# Patient Record
Sex: FEMALE | Race: WHITE | NOT HISPANIC OR LATINO | Employment: OTHER | URBAN - METROPOLITAN AREA
[De-identification: names, ages, dates, MRNs, and addresses within clinical notes are randomized per-mention and may not be internally consistent; named-entity substitution may affect disease eponyms.]

---

## 2017-01-25 ENCOUNTER — OFFICE VISIT - HEALTHEAST (OUTPATIENT)
Dept: FAMILY MEDICINE | Facility: CLINIC | Age: 67
End: 2017-01-25

## 2017-01-25 DIAGNOSIS — K59.00 CONSTIPATION: ICD-10-CM

## 2017-01-25 DIAGNOSIS — Z00.00 ROUTINE GENERAL MEDICAL EXAMINATION AT A HEALTH CARE FACILITY: ICD-10-CM

## 2017-01-25 DIAGNOSIS — Z01.411 ENCOUNTER FOR GYNECOLOGICAL EXAMINATION WITH ABNORMAL FINDING: ICD-10-CM

## 2017-01-25 DIAGNOSIS — M51.379 DEGENERATION OF LUMBAR OR LUMBOSACRAL INTERVERTEBRAL DISC: ICD-10-CM

## 2017-01-25 DIAGNOSIS — E78.5 HYPERLIPIDEMIA: ICD-10-CM

## 2017-01-25 DIAGNOSIS — Z00.00 HEALTH CARE MAINTENANCE: ICD-10-CM

## 2017-01-25 DIAGNOSIS — I10 BENIGN ESSENTIAL HYPERTENSION: ICD-10-CM

## 2017-01-25 DIAGNOSIS — E55.9 VITAMIN D DEFICIENCY: ICD-10-CM

## 2017-01-25 DIAGNOSIS — I10 ESSENTIAL HYPERTENSION, BENIGN: ICD-10-CM

## 2017-01-25 DIAGNOSIS — D22.9 ATYPICAL NEVI: ICD-10-CM

## 2017-01-25 ASSESSMENT — MIFFLIN-ST. JEOR: SCORE: 1507.24

## 2017-01-30 LAB
BKR LAB AP ABNORMAL BLEEDING: NO
BKR LAB AP BIRTH CONTROL/HORMONES: NORMAL
BKR LAB AP CERVICAL APPEARANCE: NORMAL
BKR LAB AP GYN ADEQUACY: NORMAL
BKR LAB AP GYN INTERPRETATION: NORMAL
BKR LAB AP HPV REFLEX: NORMAL
BKR LAB AP LMP: NORMAL
BKR LAB AP PATIENT STATUS: NO
BKR LAB AP PREVIOUS ABNORMAL: NORMAL
BKR LAB AP PREVIOUS NORMAL: NORMAL
HIGH RISK?: NO
HPV INTERPRETATION - HISTORICAL: NORMAL
HPV INTERPRETER - HISTORICAL: NORMAL
PATH REPORT.COMMENTS IMP SPEC: NORMAL
RESULT FLAG (HE HISTORICAL CONVERSION): NORMAL

## 2017-01-31 ENCOUNTER — AMBULATORY - HEALTHEAST (OUTPATIENT)
Dept: LAB | Facility: CLINIC | Age: 67
End: 2017-01-31

## 2017-01-31 DIAGNOSIS — E78.5 HYPERLIPIDEMIA: ICD-10-CM

## 2017-01-31 DIAGNOSIS — E55.9 VITAMIN D DEFICIENCY: ICD-10-CM

## 2017-01-31 DIAGNOSIS — I10 BENIGN ESSENTIAL HYPERTENSION: ICD-10-CM

## 2017-01-31 LAB
CHOLEST SERPL-MCNC: 140 MG/DL
FASTING STATUS PATIENT QL REPORTED: YES
HDLC SERPL-MCNC: 53 MG/DL
LDLC SERPL CALC-MCNC: 72 MG/DL
TRIGL SERPL-MCNC: 77 MG/DL

## 2017-02-07 ENCOUNTER — RECORDS - HEALTHEAST (OUTPATIENT)
Dept: ADMINISTRATIVE | Facility: OTHER | Age: 67
End: 2017-02-07

## 2017-08-26 ENCOUNTER — COMMUNICATION - HEALTHEAST (OUTPATIENT)
Dept: FAMILY MEDICINE | Facility: CLINIC | Age: 67
End: 2017-08-26

## 2017-08-26 DIAGNOSIS — I10 BENIGN ESSENTIAL HYPERTENSION: ICD-10-CM

## 2017-11-20 ENCOUNTER — AMBULATORY - HEALTHEAST (OUTPATIENT)
Dept: FAMILY MEDICINE | Facility: CLINIC | Age: 67
End: 2017-11-20

## 2017-11-20 DIAGNOSIS — M25.569 KNEE PAIN: ICD-10-CM

## 2017-11-21 ENCOUNTER — RECORDS - HEALTHEAST (OUTPATIENT)
Dept: ADMINISTRATIVE | Facility: OTHER | Age: 67
End: 2017-11-21

## 2017-12-01 ENCOUNTER — RECORDS - HEALTHEAST (OUTPATIENT)
Dept: ADMINISTRATIVE | Facility: OTHER | Age: 67
End: 2017-12-01

## 2017-12-06 ENCOUNTER — RECORDS - HEALTHEAST (OUTPATIENT)
Dept: ADMINISTRATIVE | Facility: OTHER | Age: 67
End: 2017-12-06

## 2017-12-08 ENCOUNTER — RECORDS - HEALTHEAST (OUTPATIENT)
Dept: ADMINISTRATIVE | Facility: OTHER | Age: 67
End: 2017-12-08

## 2017-12-11 ENCOUNTER — RECORDS - HEALTHEAST (OUTPATIENT)
Dept: ADMINISTRATIVE | Facility: OTHER | Age: 67
End: 2017-12-11

## 2017-12-13 ENCOUNTER — RECORDS - HEALTHEAST (OUTPATIENT)
Dept: ADMINISTRATIVE | Facility: OTHER | Age: 67
End: 2017-12-13

## 2017-12-28 ENCOUNTER — COMMUNICATION - HEALTHEAST (OUTPATIENT)
Dept: FAMILY MEDICINE | Facility: CLINIC | Age: 67
End: 2017-12-28

## 2017-12-28 DIAGNOSIS — I10 BENIGN ESSENTIAL HYPERTENSION: ICD-10-CM

## 2018-04-09 ENCOUNTER — RECORDS - HEALTHEAST (OUTPATIENT)
Dept: ADMINISTRATIVE | Facility: OTHER | Age: 68
End: 2018-04-09

## 2018-04-12 ENCOUNTER — COMMUNICATION - HEALTHEAST (OUTPATIENT)
Dept: FAMILY MEDICINE | Facility: CLINIC | Age: 68
End: 2018-04-12

## 2018-04-12 ENCOUNTER — OFFICE VISIT - HEALTHEAST (OUTPATIENT)
Dept: FAMILY MEDICINE | Facility: CLINIC | Age: 68
End: 2018-04-12

## 2018-04-12 DIAGNOSIS — I10 BENIGN ESSENTIAL HYPERTENSION: ICD-10-CM

## 2018-04-12 DIAGNOSIS — R09.82 POST-NASAL DRIP: ICD-10-CM

## 2018-04-12 DIAGNOSIS — M51.379 DEGENERATION OF LUMBAR OR LUMBOSACRAL INTERVERTEBRAL DISC: ICD-10-CM

## 2018-04-12 DIAGNOSIS — E55.9 VITAMIN D DEFICIENCY: ICD-10-CM

## 2018-04-12 DIAGNOSIS — I10 ESSENTIAL HYPERTENSION, BENIGN: ICD-10-CM

## 2018-04-12 DIAGNOSIS — E78.5 HYPERLIPIDEMIA: ICD-10-CM

## 2018-04-12 DIAGNOSIS — Z12.31 VISIT FOR SCREENING MAMMOGRAM: ICD-10-CM

## 2018-04-12 LAB
ALBUMIN SERPL-MCNC: 3.9 G/DL (ref 3.5–5)
ALP SERPL-CCNC: 203 U/L (ref 45–120)
ALT SERPL W P-5'-P-CCNC: 25 U/L (ref 0–45)
ANION GAP SERPL CALCULATED.3IONS-SCNC: 12 MMOL/L (ref 5–18)
AST SERPL W P-5'-P-CCNC: 23 U/L (ref 0–40)
BILIRUB SERPL-MCNC: 0.5 MG/DL (ref 0–1)
BUN SERPL-MCNC: 14 MG/DL (ref 8–22)
CALCIUM SERPL-MCNC: 9.7 MG/DL (ref 8.5–10.5)
CHLORIDE BLD-SCNC: 100 MMOL/L (ref 98–107)
CHOLEST SERPL-MCNC: 171 MG/DL
CO2 SERPL-SCNC: 26 MMOL/L (ref 22–31)
CREAT SERPL-MCNC: 0.75 MG/DL (ref 0.6–1.1)
ERYTHROCYTE [DISTWIDTH] IN BLOOD BY AUTOMATED COUNT: 12.2 % (ref 11–14.5)
FASTING STATUS PATIENT QL REPORTED: NO
GFR SERPL CREATININE-BSD FRML MDRD: >60 ML/MIN/1.73M2
GLUCOSE BLD-MCNC: 96 MG/DL (ref 70–125)
HCT VFR BLD AUTO: 41.8 % (ref 35–47)
HDLC SERPL-MCNC: 63 MG/DL
HGB BLD-MCNC: 14.2 G/DL (ref 12–16)
LDLC SERPL CALC-MCNC: 92 MG/DL
MCH RBC QN AUTO: 30 PG (ref 27–34)
MCHC RBC AUTO-ENTMCNC: 34.1 G/DL (ref 32–36)
MCV RBC AUTO: 88 FL (ref 80–100)
PLATELET # BLD AUTO: 305 THOU/UL (ref 140–440)
PMV BLD AUTO: 7.8 FL (ref 7–10)
POTASSIUM BLD-SCNC: 3.9 MMOL/L (ref 3.5–5)
PROT SERPL-MCNC: 7.4 G/DL (ref 6–8)
RBC # BLD AUTO: 4.75 MILL/UL (ref 3.8–5.4)
SODIUM SERPL-SCNC: 138 MMOL/L (ref 136–145)
TRIGL SERPL-MCNC: 78 MG/DL
WBC: 5.4 THOU/UL (ref 4–11)

## 2018-04-13 LAB
25(OH)D3 SERPL-MCNC: 36.6 NG/ML (ref 30–80)
25(OH)D3 SERPL-MCNC: 36.6 NG/ML (ref 30–80)

## 2018-04-27 ENCOUNTER — COMMUNICATION - HEALTHEAST (OUTPATIENT)
Dept: FAMILY MEDICINE | Facility: CLINIC | Age: 68
End: 2018-04-27

## 2018-05-14 ENCOUNTER — HOSPITAL ENCOUNTER (OUTPATIENT)
Dept: MAMMOGRAPHY | Facility: CLINIC | Age: 68
Discharge: HOME | End: 2018-05-14
Attending: FAMILY MEDICINE

## 2018-05-14 DIAGNOSIS — Z12.31 VISIT FOR SCREENING MAMMOGRAM: ICD-10-CM

## 2018-05-18 ENCOUNTER — OFFICE VISIT - HEALTHEAST (OUTPATIENT)
Dept: FAMILY MEDICINE | Facility: CLINIC | Age: 68
End: 2018-05-18

## 2018-05-18 DIAGNOSIS — R00.2 PALPITATIONS: ICD-10-CM

## 2018-05-18 DIAGNOSIS — R94.31 ABNORMAL EKG: ICD-10-CM

## 2018-05-18 DIAGNOSIS — E78.49 OTHER HYPERLIPIDEMIA: ICD-10-CM

## 2018-05-18 DIAGNOSIS — I10 ESSENTIAL HYPERTENSION, BENIGN: ICD-10-CM

## 2018-05-18 DIAGNOSIS — R00.0 RACING HEART BEAT: ICD-10-CM

## 2018-05-18 LAB
ATRIAL RATE - MUSE: 68 BPM
DIASTOLIC BLOOD PRESSURE - MUSE: NORMAL MMHG
INTERPRETATION ECG - MUSE: NORMAL
P AXIS - MUSE: 60 DEGREES
PR INTERVAL - MUSE: 226 MS
QRS DURATION - MUSE: 92 MS
QT - MUSE: 448 MS
QTC - MUSE: 476 MS
R AXIS - MUSE: -26 DEGREES
SYSTOLIC BLOOD PRESSURE - MUSE: NORMAL MMHG
T AXIS - MUSE: 41 DEGREES
VENTRICULAR RATE- MUSE: 68 BPM

## 2018-05-18 ASSESSMENT — MIFFLIN-ST. JEOR: SCORE: 1547.21

## 2018-05-21 ENCOUNTER — HOSPITAL ENCOUNTER (OUTPATIENT)
Dept: CARDIOLOGY | Facility: HOSPITAL | Age: 68
Discharge: HOME | End: 2018-05-21

## 2018-05-21 DIAGNOSIS — R00.0 RACING HEART BEAT: ICD-10-CM

## 2018-05-21 DIAGNOSIS — R00.2 PALPITATIONS: ICD-10-CM

## 2018-05-31 ENCOUNTER — OFFICE VISIT - HEALTHEAST (OUTPATIENT)
Dept: CARDIOLOGY | Facility: CLINIC | Age: 68
End: 2018-05-31

## 2018-05-31 ENCOUNTER — COMMUNICATION - HEALTHEAST (OUTPATIENT)
Dept: CARDIOLOGY | Facility: CLINIC | Age: 68
End: 2018-05-31

## 2018-05-31 DIAGNOSIS — I49.3 PVC (PREMATURE VENTRICULAR CONTRACTION): ICD-10-CM

## 2018-05-31 ASSESSMENT — MIFFLIN-ST. JEOR: SCORE: 1551.47

## 2018-06-14 ENCOUNTER — HOSPITAL ENCOUNTER (OUTPATIENT)
Dept: CARDIOLOGY | Facility: HOSPITAL | Age: 68
Discharge: HOME | End: 2018-06-14
Attending: INTERNAL MEDICINE

## 2018-06-14 LAB
AORTIC ROOT: 3.1 CM
AORTIC VALVE MEAN VELOCITY: 125 CM/S
AV DIMENSIONLESS INDEX VTI: 0.8
AV MEAN GRADIENT: 7 MMHG
AV PEAK GRADIENT: 9.5 MMHG
AV VALVE AREA: 2.1 CM2
AV VELOCITY RATIO: 0.7
BSA FOR ECHO PROCEDURE: 2.16 M2
CV BLOOD PRESSURE: NORMAL MMHG
CV ECHO HEIGHT: 67 IN
CV ECHO WEIGHT: 218 LBS
DOP CALC AO PEAK VEL: 154 CM/S
DOP CALC AO VTI: 35.2 CM
DOP CALC LVOT AREA: 2.83 CM2
DOP CALC LVOT DIAMETER: 1.9 CM
DOP CALC LVOT PEAK VEL: 115 CM/S
DOP CALC LVOT STROKE VOLUME: 75.1 CM3
DOP CALCLVOT PEAK VEL VTI: 26.5 CM
ECHO EJECTION FRACTION ESTIMATED: 65 %
EJECTION FRACTION: 62 % (ref 55–75)
FRACTIONAL SHORTENING: 35.3 % (ref 28–44)
INTERVENTRICULAR SEPTUM IN END DIASTOLE: 1.01 CM (ref 0.6–0.9)
IVS/PW RATIO: 1.1
LA AREA 1: 11 CM2
LA AREA 2: 13 CM2
LEFT ATRIUM LENGTH: 3.7 CM
LEFT ATRIUM SIZE: 3.3 CM
LEFT ATRIUM VOLUME INDEX: 15.2 ML/M2
LEFT ATRIUM VOLUME: 32.9 ML
LEFT VENTRICLE CARDIAC INDEX: 2.7 L/MIN/M2
LEFT VENTRICLE CARDIAC OUTPUT: 5.9 L/MIN
LEFT VENTRICLE DIASTOLIC VOLUME INDEX: 18.7 CM3/M2 (ref 34–74)
LEFT VENTRICLE DIASTOLIC VOLUME: 40.4 CM3 (ref 46–106)
LEFT VENTRICLE HEART RATE: 78 BPM
LEFT VENTRICLE MASS INDEX: 57.5 G/M2
LEFT VENTRICLE SYSTOLIC VOLUME INDEX: 7 CM3/M2 (ref 11–31)
LEFT VENTRICLE SYSTOLIC VOLUME: 15.2 CM3 (ref 14–42)
LEFT VENTRICULAR INTERNAL DIMENSION IN DIASTOLE: 4 CM (ref 3.8–5.2)
LEFT VENTRICULAR INTERNAL DIMENSION IN SYSTOLE: 2.59 CM (ref 2.2–3.5)
LEFT VENTRICULAR MASS: 124.1 G
LEFT VENTRICULAR OUTFLOW TRACT MEAN GRADIENT: 4 MMHG
LEFT VENTRICULAR OUTFLOW TRACT MEAN VELOCITY: 91.1 CM/S
LEFT VENTRICULAR OUTFLOW TRACT PEAK GRADIENT: 5 MMHG
LEFT VENTRICULAR POSTERIOR WALL IN END DIASTOLE: 0.96 CM (ref 0.6–0.9)
LV STROKE VOLUME INDEX: 34.8 ML/M2
MITRAL VALVE E/A RATIO: 0.9
MV AVERAGE E/E' RATIO: 12.9 CM/S
MV DECELERATION TIME: 211 MS
MV E'TISSUE VEL-LAT: 7.16 CM/S
MV E'TISSUE VEL-MED: 6.29 CM/S
MV LATERAL E/E' RATIO: 12.1
MV MEDIAL E/E' RATIO: 13.8
MV PEAK A VELOCITY: 98.5 CM/S
MV PEAK E VELOCITY: 86.8 CM/S
NUC REST DIASTOLIC VOLUME INDEX: 3488 LBS
NUC REST SYSTOLIC VOLUME INDEX: 67 IN
TRICUSPID REGURGITATION PEAK PRESSURE GRADIENT: 14 MMHG
TRICUSPID VALVE ANULAR PLANE SYSTOLIC EXCURSION: 3.7 CM
TRICUSPID VALVE PEAK REGURGITANT VELOCITY: 187 CM/S

## 2018-06-14 ASSESSMENT — MIFFLIN-ST. JEOR: SCORE: 1551.47

## 2018-07-02 ENCOUNTER — OFFICE VISIT - HEALTHEAST (OUTPATIENT)
Dept: CARDIOLOGY | Facility: CLINIC | Age: 68
End: 2018-07-02

## 2018-07-02 DIAGNOSIS — I49.3 PVC (PREMATURE VENTRICULAR CONTRACTION): ICD-10-CM

## 2018-07-02 ASSESSMENT — MIFFLIN-ST. JEOR: SCORE: 1550.33

## 2018-10-01 ENCOUNTER — COMMUNICATION - HEALTHEAST (OUTPATIENT)
Dept: FAMILY MEDICINE | Facility: CLINIC | Age: 68
End: 2018-10-01

## 2018-10-01 DIAGNOSIS — I10 BENIGN ESSENTIAL HYPERTENSION: ICD-10-CM

## 2018-11-15 ENCOUNTER — COMMUNICATION - HEALTHEAST (OUTPATIENT)
Dept: FAMILY MEDICINE | Facility: CLINIC | Age: 68
End: 2018-11-15

## 2018-11-15 ENCOUNTER — OFFICE VISIT - HEALTHEAST (OUTPATIENT)
Dept: FAMILY MEDICINE | Facility: CLINIC | Age: 68
End: 2018-11-15

## 2018-11-15 DIAGNOSIS — I10 BENIGN ESSENTIAL HYPERTENSION: ICD-10-CM

## 2018-11-15 DIAGNOSIS — D22.9 ATYPICAL NEVI: ICD-10-CM

## 2018-11-15 DIAGNOSIS — Z78.0 MENOPAUSE: ICD-10-CM

## 2018-11-15 DIAGNOSIS — Z00.00 ROUTINE GENERAL MEDICAL EXAMINATION AT A HEALTH CARE FACILITY: ICD-10-CM

## 2018-11-15 DIAGNOSIS — M51.379 DEGENERATION OF LUMBAR OR LUMBOSACRAL INTERVERTEBRAL DISC: ICD-10-CM

## 2018-11-15 DIAGNOSIS — E78.5 HYPERLIPIDEMIA: ICD-10-CM

## 2018-11-15 LAB
ALBUMIN SERPL-MCNC: 3.9 G/DL (ref 3.5–5)
ALP SERPL-CCNC: 245 U/L (ref 45–120)
ALT SERPL W P-5'-P-CCNC: 27 U/L (ref 0–45)
ANION GAP SERPL CALCULATED.3IONS-SCNC: 9 MMOL/L (ref 5–18)
AST SERPL W P-5'-P-CCNC: 27 U/L (ref 0–40)
BILIRUB SERPL-MCNC: 0.5 MG/DL (ref 0–1)
BUN SERPL-MCNC: 16 MG/DL (ref 8–22)
CALCIUM SERPL-MCNC: 9.9 MG/DL (ref 8.5–10.5)
CHLORIDE BLD-SCNC: 102 MMOL/L (ref 98–107)
CHOLEST SERPL-MCNC: 200 MG/DL
CO2 SERPL-SCNC: 29 MMOL/L (ref 22–31)
CREAT SERPL-MCNC: 0.76 MG/DL (ref 0.6–1.1)
ERYTHROCYTE [DISTWIDTH] IN BLOOD BY AUTOMATED COUNT: 12 % (ref 11–14.5)
FASTING STATUS PATIENT QL REPORTED: YES
GFR SERPL CREATININE-BSD FRML MDRD: >60 ML/MIN/1.73M2
GLUCOSE BLD-MCNC: 112 MG/DL (ref 70–125)
HCT VFR BLD AUTO: 41.2 % (ref 35–47)
HDLC SERPL-MCNC: 61 MG/DL
HGB BLD-MCNC: 14.1 G/DL (ref 12–16)
LDLC SERPL CALC-MCNC: 121 MG/DL
MCH RBC QN AUTO: 30.2 PG (ref 27–34)
MCHC RBC AUTO-ENTMCNC: 34.3 G/DL (ref 32–36)
MCV RBC AUTO: 88 FL (ref 80–100)
PLATELET # BLD AUTO: 308 THOU/UL (ref 140–440)
PMV BLD AUTO: 7.7 FL (ref 7–10)
POTASSIUM BLD-SCNC: 3.7 MMOL/L (ref 3.5–5)
PROT SERPL-MCNC: 7.1 G/DL (ref 6–8)
RBC # BLD AUTO: 4.67 MILL/UL (ref 3.8–5.4)
SODIUM SERPL-SCNC: 140 MMOL/L (ref 136–145)
TRIGL SERPL-MCNC: 88 MG/DL
WBC: 5.3 THOU/UL (ref 4–11)

## 2018-11-15 ASSESSMENT — MIFFLIN-ST. JEOR: SCORE: 1539

## 2018-12-11 ENCOUNTER — COMMUNICATION - HEALTHEAST (OUTPATIENT)
Dept: FAMILY MEDICINE | Facility: CLINIC | Age: 68
End: 2018-12-11

## 2019-01-02 ENCOUNTER — COMMUNICATION - HEALTHEAST (OUTPATIENT)
Dept: FAMILY MEDICINE | Facility: CLINIC | Age: 69
End: 2019-01-02

## 2019-01-02 DIAGNOSIS — M51.379 DEGENERATION OF LUMBAR OR LUMBOSACRAL INTERVERTEBRAL DISC: ICD-10-CM

## 2019-01-14 ENCOUNTER — COMMUNICATION - HEALTHEAST (OUTPATIENT)
Dept: FAMILY MEDICINE | Facility: CLINIC | Age: 69
End: 2019-01-14

## 2019-02-11 ENCOUNTER — COMMUNICATION - HEALTHEAST (OUTPATIENT)
Dept: FAMILY MEDICINE | Facility: CLINIC | Age: 69
End: 2019-02-11

## 2019-05-22 ENCOUNTER — COMMUNICATION - HEALTHEAST (OUTPATIENT)
Dept: FAMILY MEDICINE | Facility: CLINIC | Age: 69
End: 2019-05-22

## 2019-05-22 DIAGNOSIS — Z12.11 SCREENING FOR COLON CANCER: ICD-10-CM

## 2019-05-22 DIAGNOSIS — Z12.31 VISIT FOR SCREENING MAMMOGRAM: ICD-10-CM

## 2019-06-10 ENCOUNTER — COMMUNICATION - HEALTHEAST (OUTPATIENT)
Dept: FAMILY MEDICINE | Facility: CLINIC | Age: 69
End: 2019-06-10

## 2019-06-10 DIAGNOSIS — R52 PAIN: ICD-10-CM

## 2019-08-22 ENCOUNTER — COMMUNICATION - HEALTHEAST (OUTPATIENT)
Dept: FAMILY MEDICINE | Facility: CLINIC | Age: 69
End: 2019-08-22

## 2019-08-22 DIAGNOSIS — I10 ESSENTIAL HYPERTENSION, BENIGN: ICD-10-CM

## 2019-08-23 ENCOUNTER — HOSPITAL ENCOUNTER (OUTPATIENT)
Dept: MAMMOGRAPHY | Facility: CLINIC | Age: 69
Discharge: HOME | End: 2019-08-23
Attending: FAMILY MEDICINE

## 2019-08-23 DIAGNOSIS — Z12.31 VISIT FOR SCREENING MAMMOGRAM: ICD-10-CM

## 2019-09-30 ENCOUNTER — COMMUNICATION - HEALTHEAST (OUTPATIENT)
Dept: FAMILY MEDICINE | Facility: CLINIC | Age: 69
End: 2019-09-30

## 2019-09-30 DIAGNOSIS — M51.379 DEGENERATION OF LUMBAR OR LUMBOSACRAL INTERVERTEBRAL DISC: ICD-10-CM

## 2019-10-02 ENCOUNTER — AMBULATORY - HEALTHEAST (OUTPATIENT)
Dept: NURSING | Facility: CLINIC | Age: 69
End: 2019-10-02

## 2019-10-02 DIAGNOSIS — Z23 NEEDS FLU SHOT: ICD-10-CM

## 2019-11-04 ENCOUNTER — RECORDS - HEALTHEAST (OUTPATIENT)
Dept: ADMINISTRATIVE | Facility: OTHER | Age: 69
End: 2019-11-04

## 2019-11-08 ENCOUNTER — RECORDS - HEALTHEAST (OUTPATIENT)
Dept: ADMINISTRATIVE | Facility: OTHER | Age: 69
End: 2019-11-08

## 2019-11-12 ENCOUNTER — RECORDS - HEALTHEAST (OUTPATIENT)
Dept: ADMINISTRATIVE | Facility: OTHER | Age: 69
End: 2019-11-12

## 2019-11-14 ENCOUNTER — COMMUNICATION - HEALTHEAST (OUTPATIENT)
Dept: FAMILY MEDICINE | Facility: CLINIC | Age: 69
End: 2019-11-14

## 2019-11-14 ENCOUNTER — RECORDS - HEALTHEAST (OUTPATIENT)
Dept: ADMINISTRATIVE | Facility: OTHER | Age: 69
End: 2019-11-14

## 2019-11-14 DIAGNOSIS — I10 ESSENTIAL HYPERTENSION, BENIGN: ICD-10-CM

## 2019-11-19 ENCOUNTER — RECORDS - HEALTHEAST (OUTPATIENT)
Dept: ADMINISTRATIVE | Facility: OTHER | Age: 69
End: 2019-11-19

## 2019-11-21 ENCOUNTER — RECORDS - HEALTHEAST (OUTPATIENT)
Dept: ADMINISTRATIVE | Facility: OTHER | Age: 69
End: 2019-11-21

## 2020-01-22 ENCOUNTER — COMMUNICATION - HEALTHEAST (OUTPATIENT)
Dept: FAMILY MEDICINE | Facility: CLINIC | Age: 70
End: 2020-01-22

## 2020-02-24 ENCOUNTER — COMMUNICATION - HEALTHEAST (OUTPATIENT)
Dept: FAMILY MEDICINE | Facility: CLINIC | Age: 70
End: 2020-02-24

## 2020-02-24 DIAGNOSIS — I10 BENIGN ESSENTIAL HYPERTENSION: ICD-10-CM

## 2020-02-25 ENCOUNTER — COMMUNICATION - HEALTHEAST (OUTPATIENT)
Dept: FAMILY MEDICINE | Facility: CLINIC | Age: 70
End: 2020-02-25

## 2020-02-25 DIAGNOSIS — E78.5 HYPERLIPIDEMIA: ICD-10-CM

## 2020-03-19 ENCOUNTER — COMMUNICATION - HEALTHEAST (OUTPATIENT)
Dept: FAMILY MEDICINE | Facility: CLINIC | Age: 70
End: 2020-03-19

## 2020-03-19 DIAGNOSIS — I10 ESSENTIAL HYPERTENSION, BENIGN: ICD-10-CM

## 2020-06-03 ENCOUNTER — COMMUNICATION - HEALTHEAST (OUTPATIENT)
Dept: FAMILY MEDICINE | Facility: CLINIC | Age: 70
End: 2020-06-03

## 2020-06-03 DIAGNOSIS — I10 BENIGN ESSENTIAL HYPERTENSION: ICD-10-CM

## 2020-06-09 ENCOUNTER — COMMUNICATION - HEALTHEAST (OUTPATIENT)
Dept: FAMILY MEDICINE | Facility: CLINIC | Age: 70
End: 2020-06-09

## 2020-06-11 ENCOUNTER — OFFICE VISIT - HEALTHEAST (OUTPATIENT)
Dept: FAMILY MEDICINE | Facility: CLINIC | Age: 70
End: 2020-06-11

## 2020-06-11 ENCOUNTER — COMMUNICATION - HEALTHEAST (OUTPATIENT)
Dept: SCHEDULING | Facility: CLINIC | Age: 70
End: 2020-06-11

## 2020-06-11 DIAGNOSIS — M51.379 DEGENERATION OF LUMBAR OR LUMBOSACRAL INTERVERTEBRAL DISC: ICD-10-CM

## 2020-06-11 DIAGNOSIS — I10 ESSENTIAL HYPERTENSION, BENIGN: ICD-10-CM

## 2020-06-11 DIAGNOSIS — Z12.31 VISIT FOR SCREENING MAMMOGRAM: ICD-10-CM

## 2020-06-15 ENCOUNTER — AMBULATORY - HEALTHEAST (OUTPATIENT)
Dept: NURSING | Facility: CLINIC | Age: 70
End: 2020-06-15

## 2020-06-15 ENCOUNTER — COMMUNICATION - HEALTHEAST (OUTPATIENT)
Dept: FAMILY MEDICINE | Facility: CLINIC | Age: 70
End: 2020-06-15

## 2020-06-15 DIAGNOSIS — I10 ESSENTIAL HYPERTENSION, BENIGN: ICD-10-CM

## 2020-06-15 DIAGNOSIS — E78.5 HYPERLIPIDEMIA: ICD-10-CM

## 2020-06-16 RX ORDER — POTASSIUM CHLORIDE 1500 MG/1
TABLET, EXTENDED RELEASE ORAL
Qty: 90 TABLET | Refills: 3 | Status: SHIPPED | OUTPATIENT
Start: 2020-06-16 | End: 2021-08-26

## 2020-07-30 ENCOUNTER — COMMUNICATION - HEALTHEAST (OUTPATIENT)
Dept: FAMILY MEDICINE | Facility: CLINIC | Age: 70
End: 2020-07-30

## 2020-07-30 DIAGNOSIS — I10 ESSENTIAL HYPERTENSION, BENIGN: ICD-10-CM

## 2020-07-31 ENCOUNTER — COMMUNICATION - HEALTHEAST (OUTPATIENT)
Dept: FAMILY MEDICINE | Facility: CLINIC | Age: 70
End: 2020-07-31

## 2020-07-31 DIAGNOSIS — M51.379 DEGENERATION OF LUMBAR OR LUMBOSACRAL INTERVERTEBRAL DISC: ICD-10-CM

## 2020-08-03 ENCOUNTER — COMMUNICATION - HEALTHEAST (OUTPATIENT)
Dept: FAMILY MEDICINE | Facility: CLINIC | Age: 70
End: 2020-08-03

## 2020-08-03 DIAGNOSIS — M51.379 DEGENERATION OF LUMBAR OR LUMBOSACRAL INTERVERTEBRAL DISC: ICD-10-CM

## 2020-08-14 ENCOUNTER — OFFICE VISIT - HEALTHEAST (OUTPATIENT)
Dept: PHYSICAL THERAPY | Facility: REHABILITATION | Age: 70
End: 2020-08-14

## 2020-08-14 DIAGNOSIS — M53.3 SACRO-ILIAC PAIN: ICD-10-CM

## 2020-08-14 DIAGNOSIS — M51.379 DEGENERATION OF LUMBAR OR LUMBOSACRAL INTERVERTEBRAL DISC: ICD-10-CM

## 2020-08-26 ENCOUNTER — HOSPITAL ENCOUNTER (OUTPATIENT)
Dept: MAMMOGRAPHY | Facility: CLINIC | Age: 70
Discharge: HOME | End: 2020-08-26
Attending: FAMILY MEDICINE

## 2020-08-26 DIAGNOSIS — Z12.31 VISIT FOR SCREENING MAMMOGRAM: ICD-10-CM

## 2020-09-03 ENCOUNTER — RECORDS - HEALTHEAST (OUTPATIENT)
Dept: ADMINISTRATIVE | Facility: OTHER | Age: 70
End: 2020-09-03

## 2020-09-03 ENCOUNTER — OFFICE VISIT - HEALTHEAST (OUTPATIENT)
Dept: FAMILY MEDICINE | Facility: CLINIC | Age: 70
End: 2020-09-03

## 2020-09-03 DIAGNOSIS — Z01.411 ENCOUNTER FOR GYNECOLOGICAL EXAMINATION (GENERAL) (ROUTINE) WITH ABNORMAL FINDINGS: ICD-10-CM

## 2020-09-03 DIAGNOSIS — D22.9 ATYPICAL NEVI: ICD-10-CM

## 2020-09-03 DIAGNOSIS — E78.49 OTHER HYPERLIPIDEMIA: ICD-10-CM

## 2020-09-03 DIAGNOSIS — I10 ESSENTIAL HYPERTENSION, BENIGN: ICD-10-CM

## 2020-09-03 DIAGNOSIS — E78.5 HYPERLIPIDEMIA: ICD-10-CM

## 2020-09-03 DIAGNOSIS — I10 BENIGN ESSENTIAL HYPERTENSION: ICD-10-CM

## 2020-09-03 DIAGNOSIS — Z13.6 ENCOUNTER FOR SCREENING FOR CARDIOVASCULAR DISORDERS: ICD-10-CM

## 2020-09-03 DIAGNOSIS — H81.10 BENIGN PAROXYSMAL POSITIONAL VERTIGO, UNSPECIFIED LATERALITY: ICD-10-CM

## 2020-09-03 DIAGNOSIS — E55.9 VITAMIN D DEFICIENCY: ICD-10-CM

## 2020-09-03 DIAGNOSIS — M51.379 DEGENERATION OF LUMBAR OR LUMBOSACRAL INTERVERTEBRAL DISC: ICD-10-CM

## 2020-09-03 DIAGNOSIS — Z00.00 ROUTINE GENERAL MEDICAL EXAMINATION AT A HEALTH CARE FACILITY: ICD-10-CM

## 2020-09-03 LAB
ALBUMIN SERPL-MCNC: 4 G/DL (ref 3.5–5)
ALP SERPL-CCNC: 210 U/L (ref 45–120)
ALT SERPL W P-5'-P-CCNC: 26 U/L (ref 0–45)
ANION GAP SERPL CALCULATED.3IONS-SCNC: 11 MMOL/L (ref 5–18)
AST SERPL W P-5'-P-CCNC: 27 U/L (ref 0–40)
BILIRUB SERPL-MCNC: 0.5 MG/DL (ref 0–1)
BUN SERPL-MCNC: 14 MG/DL (ref 8–22)
CALCIUM SERPL-MCNC: 9.7 MG/DL (ref 8.5–10.5)
CHLORIDE BLD-SCNC: 99 MMOL/L (ref 98–107)
CHOLEST SERPL-MCNC: 160 MG/DL
CO2 SERPL-SCNC: 29 MMOL/L (ref 22–31)
CREAT SERPL-MCNC: 0.75 MG/DL (ref 0.6–1.1)
ERYTHROCYTE [DISTWIDTH] IN BLOOD BY AUTOMATED COUNT: 12.7 % (ref 11–14.5)
FASTING STATUS PATIENT QL REPORTED: YES
GFR SERPL CREATININE-BSD FRML MDRD: >60 ML/MIN/1.73M2
GLUCOSE BLD-MCNC: 97 MG/DL (ref 70–125)
HCT VFR BLD AUTO: 41.6 % (ref 35–47)
HDLC SERPL-MCNC: 59 MG/DL
HGB BLD-MCNC: 14 G/DL (ref 12–16)
LDLC SERPL CALC-MCNC: 87 MG/DL
MCH RBC QN AUTO: 29.7 PG (ref 27–34)
MCHC RBC AUTO-ENTMCNC: 33.5 G/DL (ref 32–36)
MCV RBC AUTO: 89 FL (ref 80–100)
PLATELET # BLD AUTO: 264 THOU/UL (ref 140–440)
PMV BLD AUTO: 7.6 FL (ref 7–10)
POTASSIUM BLD-SCNC: 3.5 MMOL/L (ref 3.5–5)
PROT SERPL-MCNC: 7 G/DL (ref 6–8)
RBC # BLD AUTO: 4.7 MILL/UL (ref 3.8–5.4)
SODIUM SERPL-SCNC: 139 MMOL/L (ref 136–145)
TRIGL SERPL-MCNC: 71 MG/DL
WBC: 5.3 THOU/UL (ref 4–11)

## 2020-09-03 RX ORDER — HYDROCHLOROTHIAZIDE 50 MG/1
50 TABLET ORAL DAILY
Qty: 90 TABLET | Refills: 3 | Status: SHIPPED | OUTPATIENT
Start: 2020-09-03 | End: 2021-10-18

## 2020-09-03 RX ORDER — ATORVASTATIN CALCIUM 20 MG/1
TABLET, FILM COATED ORAL
Qty: 135 TABLET | Refills: 3 | Status: SHIPPED | OUTPATIENT
Start: 2020-09-03 | End: 2021-10-18

## 2020-09-03 RX ORDER — METOPROLOL SUCCINATE 25 MG/1
25 TABLET, EXTENDED RELEASE ORAL DAILY
Qty: 90 TABLET | Refills: 3 | Status: SHIPPED | OUTPATIENT
Start: 2020-09-03 | End: 2021-10-18

## 2020-09-03 RX ORDER — CELECOXIB 100 MG/1
100 CAPSULE ORAL 2 TIMES DAILY
Qty: 60 CAPSULE | Refills: 5 | Status: SHIPPED | OUTPATIENT
Start: 2020-09-03 | End: 2022-04-18

## 2020-09-03 ASSESSMENT — MIFFLIN-ST. JEOR: SCORE: 1490.8

## 2020-09-04 ENCOUNTER — OFFICE VISIT - HEALTHEAST (OUTPATIENT)
Dept: PHYSICAL THERAPY | Facility: REHABILITATION | Age: 70
End: 2020-09-04

## 2020-09-04 DIAGNOSIS — M51.379 DEGENERATION OF LUMBAR OR LUMBOSACRAL INTERVERTEBRAL DISC: ICD-10-CM

## 2020-09-04 DIAGNOSIS — M53.3 SACRO-ILIAC PAIN: ICD-10-CM

## 2020-09-04 LAB
25(OH)D3 SERPL-MCNC: 38.3 NG/ML (ref 30–80)
25(OH)D3 SERPL-MCNC: 38.3 NG/ML (ref 30–80)
HPV SOURCE: NORMAL
HUMAN PAPILLOMA VIRUS 16 DNA: NEGATIVE
HUMAN PAPILLOMA VIRUS 18 DNA: NEGATIVE
HUMAN PAPILLOMA VIRUS FINAL DIAGNOSIS: NORMAL
HUMAN PAPILLOMA VIRUS OTHER HR: NEGATIVE
SPECIMEN DESCRIPTION: NORMAL

## 2020-09-11 ENCOUNTER — OFFICE VISIT - HEALTHEAST (OUTPATIENT)
Dept: PHYSICAL THERAPY | Facility: REHABILITATION | Age: 70
End: 2020-09-11

## 2020-09-11 DIAGNOSIS — M51.379 DEGENERATION OF LUMBAR OR LUMBOSACRAL INTERVERTEBRAL DISC: ICD-10-CM

## 2020-09-11 DIAGNOSIS — M53.3 SACRO-ILIAC PAIN: ICD-10-CM

## 2020-09-17 ENCOUNTER — COMMUNICATION - HEALTHEAST (OUTPATIENT)
Dept: FAMILY MEDICINE | Facility: CLINIC | Age: 70
End: 2020-09-17

## 2020-09-17 DIAGNOSIS — R52 PAIN: ICD-10-CM

## 2020-09-18 ENCOUNTER — OFFICE VISIT - HEALTHEAST (OUTPATIENT)
Dept: PHYSICAL THERAPY | Facility: REHABILITATION | Age: 70
End: 2020-09-18

## 2020-09-18 DIAGNOSIS — M51.379 DEGENERATION OF LUMBAR OR LUMBOSACRAL INTERVERTEBRAL DISC: ICD-10-CM

## 2020-09-18 DIAGNOSIS — M53.3 SACRO-ILIAC PAIN: ICD-10-CM

## 2020-09-18 RX ORDER — IBUPROFEN 800 MG/1
800 TABLET, FILM COATED ORAL 3 TIMES DAILY PRN
Qty: 90 TABLET | Refills: 3 | Status: SHIPPED | OUTPATIENT
Start: 2020-09-18 | End: 2021-11-24

## 2020-09-25 ENCOUNTER — OFFICE VISIT - HEALTHEAST (OUTPATIENT)
Dept: PHYSICAL THERAPY | Facility: REHABILITATION | Age: 70
End: 2020-09-25

## 2020-09-25 DIAGNOSIS — M51.379 DEGENERATION OF LUMBAR OR LUMBOSACRAL INTERVERTEBRAL DISC: ICD-10-CM

## 2020-09-25 DIAGNOSIS — M53.3 SACRO-ILIAC PAIN: ICD-10-CM

## 2020-09-28 ENCOUNTER — OFFICE VISIT - HEALTHEAST (OUTPATIENT)
Dept: OCCUPATIONAL THERAPY | Facility: REHABILITATION | Age: 70
End: 2020-09-28

## 2020-09-28 DIAGNOSIS — Z78.9 DECREASED ACTIVITIES OF DAILY LIVING (ADL): ICD-10-CM

## 2020-09-28 DIAGNOSIS — R26.81 UNSTEADINESS: ICD-10-CM

## 2020-09-28 DIAGNOSIS — H81.11 BENIGN PAROXYSMAL POSITIONAL VERTIGO, RIGHT: ICD-10-CM

## 2020-10-02 ENCOUNTER — OFFICE VISIT - HEALTHEAST (OUTPATIENT)
Dept: PHYSICAL THERAPY | Facility: REHABILITATION | Age: 70
End: 2020-10-02

## 2020-10-02 DIAGNOSIS — M51.379 DEGENERATION OF LUMBAR OR LUMBOSACRAL INTERVERTEBRAL DISC: ICD-10-CM

## 2020-10-02 DIAGNOSIS — M53.3 SACRO-ILIAC PAIN: ICD-10-CM

## 2020-10-09 ENCOUNTER — COMMUNICATION - HEALTHEAST (OUTPATIENT)
Dept: FAMILY MEDICINE | Facility: CLINIC | Age: 70
End: 2020-10-09

## 2020-10-09 DIAGNOSIS — R09.82 POST-NASAL DRIP: ICD-10-CM

## 2020-10-29 ENCOUNTER — COMMUNICATION - HEALTHEAST (OUTPATIENT)
Dept: FAMILY MEDICINE | Facility: CLINIC | Age: 70
End: 2020-10-29

## 2020-10-29 DIAGNOSIS — M51.379 DEGENERATION OF LUMBAR OR LUMBOSACRAL INTERVERTEBRAL DISC: ICD-10-CM

## 2020-10-30 RX ORDER — CYCLOBENZAPRINE HCL 10 MG
10 TABLET ORAL 3 TIMES DAILY PRN
Qty: 30 TABLET | Refills: 3 | Status: SHIPPED | OUTPATIENT
Start: 2020-10-30 | End: 2022-04-18

## 2021-03-18 ENCOUNTER — OFFICE VISIT - HEALTHEAST (OUTPATIENT)
Dept: FAMILY MEDICINE | Facility: CLINIC | Age: 71
End: 2021-03-18

## 2021-03-18 ENCOUNTER — RECORDS - HEALTHEAST (OUTPATIENT)
Dept: GENERAL RADIOLOGY | Facility: CLINIC | Age: 71
End: 2021-03-18

## 2021-03-18 DIAGNOSIS — E78.49 OTHER HYPERLIPIDEMIA: ICD-10-CM

## 2021-03-18 DIAGNOSIS — I10 ESSENTIAL HYPERTENSION, BENIGN: ICD-10-CM

## 2021-03-18 DIAGNOSIS — R07.81 RIB PAIN: ICD-10-CM

## 2021-03-18 DIAGNOSIS — R07.81 PLEURODYNIA: ICD-10-CM

## 2021-03-18 LAB — POTASSIUM BLD-SCNC: 3.6 MMOL/L (ref 3.5–5)

## 2021-05-26 ENCOUNTER — RECORDS - HEALTHEAST (OUTPATIENT)
Dept: ADMINISTRATIVE | Facility: CLINIC | Age: 71
End: 2021-05-26

## 2021-05-27 ENCOUNTER — RECORDS - HEALTHEAST (OUTPATIENT)
Dept: ADMINISTRATIVE | Facility: CLINIC | Age: 71
End: 2021-05-27

## 2021-05-27 VITALS — HEART RATE: 77 BPM | SYSTOLIC BLOOD PRESSURE: 141 MMHG | DIASTOLIC BLOOD PRESSURE: 87 MMHG

## 2021-05-28 ENCOUNTER — RECORDS - HEALTHEAST (OUTPATIENT)
Dept: ADMINISTRATIVE | Facility: CLINIC | Age: 71
End: 2021-05-28

## 2021-05-29 NOTE — TELEPHONE ENCOUNTER
RN cannot approve Refill Request    RN can NOT refill this medication Protocol failed and NO refill given      . Last office visit: 4/12/2018 Anahi Aguilar MD Last Physical: 11/15/2018 Last MTM visit: Visit date not found Last visit same specialty: 5/18/2018 Kristina Marquez MD.  Next visit within 3 mo: Visit date not found  Next physical within 3 mo: Visit date not found      Amanda Estrella, [facility] Triage/Med Refill 6/11/2019    Requested Prescriptions   Pending Prescriptions Disp Refills     ibuprofen (ADVIL,MOTRIN) 800 MG tablet [Pharmacy Med Name: IBUPROFEN 800MG TABLETS] 90 tablet 0     Sig: TAKE 1 TABLET BY MOUTH THREE TIMES DAILY AS NEEDED       There is no refill protocol information for this order

## 2021-05-30 VITALS — WEIGHT: 209.13 LBS | BODY MASS INDEX: 32.82 KG/M2 | HEIGHT: 67 IN

## 2021-05-31 ENCOUNTER — RECORDS - HEALTHEAST (OUTPATIENT)
Dept: ADMINISTRATIVE | Facility: CLINIC | Age: 71
End: 2021-05-31

## 2021-05-31 NOTE — TELEPHONE ENCOUNTER
Refill Approved    Rx renewed per Medication Renewal Policy. Medication was last renewed on 4/12/18.    Amanda Estrella, [facility] Triage/Med Refill 8/23/2019     Requested Prescriptions   Pending Prescriptions Disp Refills     potassium chloride (K-DUR,KLOR-CON) 20 MEQ tablet [Pharmacy Med Name: POTASSIUM CL 20MEQ ER TABLETS] 90 tablet 0     Sig: TAKE 1 TABLET(20 MEQ) BY MOUTH DAILY       Potassium Supplements Refill Protocol Passed - 8/22/2019 12:26 PM        Passed - PCP or prescribing provider visit in past 12 months       Last office visit with prescriber/PCP: 4/12/2018 Anahi Aguilar MD OR same dept: Visit date not found OR same specialty: 5/18/2018 Kristina Marquez MD  Last physical: 11/15/2018 Last MTM visit: Visit date not found   Next visit within 3 mo: Visit date not found  Next physical within 3 mo: Visit date not found  Prescriber OR PCP: Anahi Aguilar MD  Last diagnosis associated with med order: 1. Benign Essential Hypertension  - potassium chloride (K-DUR,KLOR-CON) 20 MEQ tablet [Pharmacy Med Name: POTASSIUM CL 20MEQ ER TABLETS]; TAKE 1 TABLET(20 MEQ) BY MOUTH DAILY  Dispense: 90 tablet; Refill: 0    If protocol passes may refill for 12 months if within 3 months of last provider visit (or a total of 15 months).             Passed - Potassium level in last 12 months     Lab Results   Component Value Date    Potassium 3.7 11/15/2018

## 2021-06-01 VITALS — HEIGHT: 67 IN | WEIGHT: 218 LBS | BODY MASS INDEX: 34.21 KG/M2

## 2021-06-01 VITALS — HEIGHT: 67 IN | BODY MASS INDEX: 34.21 KG/M2 | WEIGHT: 218 LBS

## 2021-06-01 VITALS — BODY MASS INDEX: 34.72 KG/M2 | WEIGHT: 220 LBS

## 2021-06-01 VITALS — HEIGHT: 68 IN | BODY MASS INDEX: 32.74 KG/M2 | WEIGHT: 216 LBS

## 2021-06-02 VITALS — BODY MASS INDEX: 33.79 KG/M2 | WEIGHT: 215.25 LBS | HEIGHT: 67 IN

## 2021-06-03 NOTE — TELEPHONE ENCOUNTER
Refill Approved    Rx renewed per Medication Renewal Policy. Medication was last renewed on 8/23/19    Kimmie Cohen, [facility] Triage/Med Refill 11/15/2019     Requested Prescriptions   Pending Prescriptions Disp Refills     potassium chloride (K-DUR,KLOR-CON) 20 MEQ tablet [Pharmacy Med Name: POTASSIUM CL 20MEQ ER TABLETS] 90 tablet 0     Sig: TAKE 1 TABLET(20 MEQ) BY MOUTH DAILY       Potassium Supplements Refill Protocol Passed - 11/14/2019  1:29 PM        Passed - PCP or prescribing provider visit in past 12 months       Last office visit with prescriber/PCP: 4/12/2018 Anahi Aguilar MD OR same dept: Visit date not found OR same specialty: 5/18/2018 Kristina Marquez MD  Last physical: 11/15/2018 Last MTM visit: Visit date not found   Next visit within 3 mo: Visit date not found  Next physical within 3 mo: Visit date not found  Prescriber OR PCP: Anahi Aguilar MD  Last diagnosis associated with med order: 1. Benign Essential Hypertension  - potassium chloride (K-DUR,KLOR-CON) 20 MEQ tablet [Pharmacy Med Name: POTASSIUM CL 20MEQ ER TABLETS]; TAKE 1 TABLET(20 MEQ) BY MOUTH DAILY  Dispense: 90 tablet; Refill: 0    If protocol passes may refill for 12 months if within 3 months of last provider visit (or a total of 15 months).             Passed - Potassium level in last 12 months     No results found for: LN-POTASSIUM

## 2021-06-04 VITALS
BODY MASS INDEX: 32.25 KG/M2 | HEIGHT: 67 IN | SYSTOLIC BLOOD PRESSURE: 139 MMHG | TEMPERATURE: 97.1 F | DIASTOLIC BLOOD PRESSURE: 84 MMHG | WEIGHT: 205.5 LBS | HEART RATE: 71 BPM

## 2021-06-05 VITALS
BODY MASS INDEX: 32.88 KG/M2 | SYSTOLIC BLOOD PRESSURE: 133 MMHG | RESPIRATION RATE: 16 BRPM | WEIGHT: 208.38 LBS | HEART RATE: 64 BPM | TEMPERATURE: 97.4 F | DIASTOLIC BLOOD PRESSURE: 80 MMHG

## 2021-06-06 NOTE — TELEPHONE ENCOUNTER
RN cannot approve Refill Request    RN can NOT refill this medication Protocol failed and NO refill given.       Amanda Estrella, [facility] Triage/Med Refill 2/25/2020    Requested Prescriptions   Pending Prescriptions Disp Refills     atorvastatin (LIPITOR) 20 MG tablet 135 tablet 3     Sig: TAKE 1 1/2 TABLET BY MOUTH EVERY DAY       Statins Refill Protocol (Hmg CoA Reductase Inhibitors) Failed - 2/25/2020  1:04 PM        Failed - PCP or prescribing provider visit in past 12 months      Last office visit with prescriber/PCP: 4/12/2018 Anahi Aguilar MD OR same dept: Visit date not found OR same specialty: 5/18/2018 Kristina Marquez MD  Last physical: 11/15/2018 Last MTM visit: Visit date not found   Next visit within 3 mo: Visit date not found  Next physical within 3 mo: Visit date not found  Prescriber OR PCP: Anahi Aguilar MD  Last diagnosis associated with med order: 1. Hyperlipidemia  - atorvastatin (LIPITOR) 20 MG tablet; TAKE 1 1/2 TABLET BY MOUTH EVERY DAY  Dispense: 135 tablet; Refill: 3    If protocol passes may refill for 12 months if within 3 months of last provider visit (or a total of 15 months).

## 2021-06-08 NOTE — TELEPHONE ENCOUNTER
Who is calling:  Ana from [facility]  Reason for Call:  Ana called from [facility]  wanted to inform Dr Aguilar the 10-18-19 office note is in the [facility]  Date of last appointment with primary care: 11-15-18  Okay to leave a detailed message: No, [facility] # is [phone]

## 2021-06-08 NOTE — PROGRESS NOTES
Assessment/Plan:  1. Health care maintenance  Gynecologic Cytology (PAP Smear)   2. Hyperlipidemia  atorvastatin (LIPITOR) 20 MG tablet    Lipid Cascade   3. Lumbar Disc Degeneration  cyclobenzaprine (FLEXERIL) 10 MG tablet   4. Benign essential hypertension  hydroCHLOROthiazide (HYDRODIURIL) 50 MG tablet    Comprehensive Metabolic Panel    HM2(CBC w/o Differential)   5. Benign Essential Hypertension  potassium chloride SA (K-DUR,KLOR-CON) 20 MEQ tablet   6. Vitamin D deficiency  Vitamin D, Total (25-Hydroxy)   7. Routine general medical examination at a health care facility     8. Atypical nevi  Ambulatory referral to Dermatology   9. Encounter for gynecological examination with abnormal finding   Gynecologic Cytology (PAP Smear)   10. Constipation       Set a morning nurse appointment for fasting labs.    Return in 6 months for a medication check. Return in 1 year for next physical exam.    Consider Zostavax (Shingles shot) and set nurse visit if wishes.    Recommend up to 30 g of fiber per day. Could add on Colace (stool softener) 100-400 mg daily as needed for constipation.  Could add MiraLAX nightly as directed if needed.    If needed after fiber and laxatives, try Linzess 145 mg for constipation.    Congratulations for weight loss!    Schedule with dermatology for a full-body skin check.    Monitor the shortness of breath with exercise. Call if it worsens and will order stress test.       Patient is a 66 y.o. female here for physical exam. See health maintenance section below. Labs as ordered.        HPI    Chief Complaint   Patient presents with     Annual Exam     physical, not fasting      Constipation: She has been constipated for at least 5 months, and for the last 2 months has been taking laxatives. She had a colonoscopy on 10/7/2016, which revealed a colon polyp, which was removed. She has been eating more fiber since restarting Weight [employer] and is trying to walk more. She denies any abdominal pain.   She wonders if she has constipation predominant irritable bowel syndrome and if Linzess would be helpful.    Health Maintenance   Topic Date Due     ZOSTER VACCINE  Check with insurance on Zostavax (Shingles shot) and set nurse visit if wishes.     MAMMOGRAM  Done in June 2016, due 6/20/201     ADVANCE DIRECTIVES DISCUSSED WITH PATIENT  Packet given     PAP SMEAR  Today     COLONOSCOPY  Done in Oct. 2016, due 10/07/2021     TD 18+ HE  08/24/2025     INFLUENZA VACCINE RULE BASED  Completed     TDAP ADULT ONE TIME DOSE  Completed     DEXA scan-Do not see that one has been done, patient declined     Patient Active Problem List   Diagnosis     Esophageal Reflux     Hyperactivity Of The Bladder     Postmenopausal Atrophic Vaginitis     Lumbar Disc Degeneration     Disease Of The Nails     Vitamin D Deficiency     Hyperlipidemia     Benign Essential Hypertension     Constipation     Hypercalcemia     Hypokalemia     Anxiety     Abnormal mammogram, unspecified     Abnormal Pap smear of cervix     Current Outpatient Prescriptions   Medication Sig     atorvastatin (LIPITOR) 20 MG tablet TAKE 1 1/2 TABLET BY MOUTH EVERY DAY     cyclobenzaprine (FLEXERIL) 10 MG tablet 1 tab at bedtime as needed for back     fluticasone (FLONASE) 50 mcg/actuation nasal spray Instill 1 spray in each nostril bid     hydrochlorothiazide (HYDRODIURIL) 50 MG tablet TAKE 1 TABLET BY MOUTH EVERY DAY.     ibuprofen (ADVIL,MOTRIN) 800 MG tablet TAKE 1 TABLET BY MOUTH THREE TIMES DAILY AS NEEDED     potassium chloride SA (K-DUR,KLOR-CON) 20 MEQ tablet Take 1 tablet (20 mEq total) by mouth daily.       aPatient is a 66 y.o. female presents for a physical exam.    The following portions of the patient's history were reviewed and updated as appropriate: allergies, current medications, past family history, past medical history, past social history, past surgical history and problem list.    Review of Systems  Her blood sugar was 113 at home when checked with  "her  s glucose test  before eating. 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 She has shortness of breath with exercise, which she thinks is probably due to previously being inactive. She denies any chest pain. She denies any vaginal bleeding since menopause  Pertinent items are noted in HPI.  A 12 point comprehensive review of systems was negative except as noted.     PFSH  She exercises 4 times per week.    Immunization History   Administered Date(s) Administered     DT (pediatric) 06/13/2003     Hep A, historic 09/24/2007, 09/21/2009     Influenza, seasonal,quad inj 36+ mos 11/03/2016     Influenza, seasonal,quad inj 6-35 mos 09/21/2009, 11/02/2011, 01/10/2013, 10/09/2013     Influenza,seasonal quad, PF, 36+MOS 02/01/2016     Td, historic 06/13/2003     Tdap 08/24/2015     No results found for this or any previous visit (from the past 240 hour(s)).  I have had an Advance Directives discussion with the patient.  The following high BMI interventions were performed this visit: encouragement to exercise, encouragement to continue monitoring weight and keeping a journal of calories.    Objective:    Visit Vitals     /80 (Patient Site: Right Arm, Patient Position: Sitting, Cuff Size: Adult Large)     Pulse 80     Temp 98.3  F (36.8  C) (Oral)     Ht 5' 6.75\" (1.695 m)     Wt 209 lb 2 oz (94.9 kg)     BMI 33 kg/m2         General Appearance:    Alert, cooperative, no distress, appears stated age   Head:    Normocephalic, without obvious abnormality, atraumatic   Eyes:    PERRL, conjunctiva/corneas clear, EOM's intact, fundi     benign, both eyes   Ears:    Normal TM's and external ear canals, both ears   Nose:   Nares normal, septum midline, mucosa normal, no drainage    or sinus tenderness   Throat:   Lips, mucosa, and tongue normal; teeth and gums normal   Neck:   Supple, symmetrical, trachea midline, no adenopathy;     thyroid:  no " enlargement/tenderness/nodules; no carotid    bruit or JVD   Back:     Symmetric, no curvature, ROM normal, no CVA tenderness   Lungs:     Clear to auscultation bilaterally, respirations unlabored   Chest Wall:    No tenderness or deformity    Heart:    Regular rate and rhythm, S1 and S2 normal, no murmur, rub   or gallop   Breast Exam:    No tenderness, masses, or nipple abnormality   Abdomen:     Soft, non-tender, bowel sounds active all four quadrants,     no masses, no organomegaly   Genitalia:    Normal female without lesion, discharge or tenderness, unable to palpate uterus and ovaries secondary to body habitus       Extremities:   Extremities normal, atraumatic, no cyanosis or edema   Pulses:   2+ and symmetric all extremities   Skin:   Skin color, texture, turgor normal, no rashes. Multiple scattered brown papules.    Lymph nodes:   Cervical, supraclavicular, and axillary nodes normal   Neurologic:   CNII-XII intact, normal strength, sensation and reflexes     throughout        The visit lasted a total of 36 minutes face to face with the patient. Over 50% of the time was spent counseling and educating the patient about exam and constipation.    IJudy, am scribing for and in the presence of, Dr. Anahi Aguilar.    I, Dr. Anahi Aguilar, personally performed the services described in this documentation, as scribed by Judy Herrera in my presence, and it is both accurate and complete.

## 2021-06-08 NOTE — PROGRESS NOTES
For her age I think the blood pressure is okay.  I do not want to push it too low.  We will recheck again at her annual wellness in September and if still elevated would consider increasing metoprolol.

## 2021-06-08 NOTE — PATIENT INSTRUCTIONS - HE
Please set for BP check with nurse now.    Also, we will change her med check on Sept 3 to annual wellness and change to 11:00    Mammogram due Aug, ordered.    Colonoscopy due Oct 2021.    Discuss DEXA at [facility].    [facility], fasting in Sept.    Pneumovax 23 due at [facility]    If you are interested in the new shingles shot, Shingrix, please check with insurance for coverage either in clinic or at a pharmacy. It is a 2 shot series 2-6 months apart.     To discuss Derm consult at [facility].    Call [facility] to see if they are seeing pts yet.    [facility] referral if needed.

## 2021-06-08 NOTE — TELEPHONE ENCOUNTER
RN cannot approve Refill Request    RN can NOT refill this medication PCP messaged that patient is overdue for Labs and Office Visit. Last office visit: 4/12/2018 Anahi Aguilar MD Last Physical: 11/15/2018 Last MTM visit: Visit date not found Last visit same specialty: 5/18/2018 rKistina Marquez MD.  Next visit within 3 mo: Visit date not found  Next physical within 3 mo: Visit date not found      Griselda Hernández, [facility] Triage/Med Refill 6/6/2020    Requested Prescriptions   Pending Prescriptions Disp Refills     hydroCHLOROthiazide (HYDRODIURIL) 50 MG tablet [Pharmacy Med Name: HYDROCHLOROTHIAZIDE 50MG TABLETS] 90 tablet 0     Sig: TAKE 1 TABLET(50 MG) BY MOUTH DAILY       Diuretics/Combination Diuretics Refill Protocol  Failed - 6/3/2020 11:36 AM        Failed - Visit with PCP or prescribing provider visit in past 12 months     Last office visit with prescriber/PCP: 4/12/2018 Anahi Aguilar MD OR same dept: Visit date not found OR same specialty: 5/18/2018 Kristina Marquez MD  Last physical: 11/15/2018 Last MTM visit: Visit date not found   Next visit within 3 mo: Visit date not found  Next physical within 3 mo: Visit date not found  Prescriber OR PCP: Anahi Aguilar MD  Last diagnosis associated with med order: 1. Benign essential hypertension  - hydroCHLOROthiazide (HYDRODIURIL) 50 MG tablet [Pharmacy Med Name: HYDROCHLOROTHIAZIDE 50MG TABLETS]; TAKE 1 TABLET(50 MG) BY MOUTH DAILY  Dispense: 90 tablet; Refill: 0    If protocol passes may refill for 12 months if within 3 months of last provider visit (or a total of 15 months).             Failed - Serum Potassium in past 12 months      No results found for: LN-POTASSIUM          Failed - Serum Sodium in past 12 months      No results found for: LN-SODIUM          Failed - Blood pressure on file in past 12 months     BP Readings from Last 1 Encounters:   11/15/18 132/85             Failed - Serum Creatinine in past 12 months       Creatinine   Date Value Ref Range Status   11/15/2018 0.76 0.60 - 1.10 mg/dL Final

## 2021-06-08 NOTE — PROGRESS NOTES
"Migdalia Coronado is a 69 y.o. female who is being evaluated via a billable telephone visit.      The patient has been notified of following:     \"This telephone visit will be conducted via a call between you and your physician/provider. We have found that certain health care needs can be provided without the need for a physical exam.  This service lets us provide the care you need with a short phone conversation.  If a prescription is necessary we can send it directly to your pharmacy.  If lab work is needed we can place an order for that and you can then stop by our lab to have the test done at a later time.    Telephone visits are billed at different rates depending on your insurance coverage. During this emergency period, for some insurers they may be billed the same as an in-person visit.  Please reach out to your insurance provider with any questions.    If during the course of the call the physician/provider feels a telephone visit is not appropriate, you will not be charged for this service.\"    Patient has given verbal consent to a Telephone visit? Yes    What phone number would you like to be contacted at? [phone]    Patient would like to receive their AVS by AVS Preference: Bonnie.    Additional provider notes:          Chief Complaint   Patient presents with     Hypertension     Medication check      Hyperlipidemia     Medication check      Hypertension:  No recent BP check.  No headache, vision change or chest pain.  She feels fine and does not feel physically like it is elevated.    Low back pain: She does have lumbar disc degeneration.  Saw a spine doctor in Nov.  Admit with spine clinic.  We do not have those notes but will get those for review.  She then did see a physical therapist at [facility] ([facility]  She is still having back pain worst in the morning.  She does a lot of stretching icing and ibuprofen.  She would like to reestablish care with a spine doctor when she is able.    She discussed her " 's health today and he will be having a virtual visit tomorrow.  She said recently he had redness along all of the knuckles on the top of his hand.  Has been doing ice and ibuprofen and has gotten a little bit better but she would like me just to discuss this with him.    Assessment/Plan:  1. Visit for screening mammogram  Mammo Screening Bilateral   2. Benign Essential Hypertension     3. Lumbar Disc Degeneration       Please set for BP check with nurse now.    Also, we will change her med check on Sept 3 to annual wellness and change to 11:00    Mammogram due Aug, ordered.    Colonoscopy due Oct 2021.    Discuss DEXA at [facility].    [facility], fasting in Sept.    Pneumovax 23 due at [facility]    If you are interested in the new shingles shot, Shingrix, please check with insurance for coverage either in clinic or at a pharmacy. It is a 2 shot series 2-6 months apart.     To discuss Derm consult at [facility].    Call [facility] to see if they are seeing pts yet.    [facility] referral if needed.      Phone call duration:  21  minutes    Anahi Aguilar MD

## 2021-06-08 NOTE — TELEPHONE ENCOUNTER
Please call patient.  Please set up for virtual visit with me next Wednesday Thursday or Friday afternoon.  Please check with [facility] I believe they are going to be opening my schedule.  Please then help her set a physical in the fall.  Thank you.

## 2021-06-08 NOTE — TELEPHONE ENCOUNTER
RN cannot approve Refill Request    RN can NOT refill this medication Protocol failed and NO refill given.       Amanda Estrella, [facility] Triage/Med Refill 6/16/2020    Requested Prescriptions   Pending Prescriptions Disp Refills     atorvastatin (LIPITOR) 20 MG tablet [Pharmacy Med Name: ATORVASTATIN 20MG TABLETS] 135 tablet 3     Sig: TAKE 1 AND 1/2 TABLETS BY MOUTH EVERY DAY       Statins Refill Protocol (Hmg CoA Reductase Inhibitors) Failed - 6/15/2020 12:51 PM        Failed - PCP or prescribing provider visit in past 12 months      Last office visit with prescriber/PCP: 4/12/2018 Anahi Aguilar MD OR same dept: Visit date not found OR same specialty: 5/18/2018 Kristina Marquez MD  Last physical: 11/15/2018 Last MTM visit: Visit date not found   Next visit within 3 mo: Visit date not found  Next physical within 3 mo: Visit date not found  Prescriber OR PCP: Anahi Aguilar MD  Last diagnosis associated with med order: 1. Hyperlipidemia  - atorvastatin (LIPITOR) 20 MG tablet [Pharmacy Med Name: ATORVASTATIN 20MG TABLETS]; TAKE 1 AND 1/2 TABLETS BY MOUTH EVERY DAY  Dispense: 135 tablet; Refill: 0    2. Benign Essential Hypertension  - potassium chloride (K-DUR,KLOR-CON) 20 MEQ tablet [Pharmacy Med Name: POTASSIUM CL 20MEQ ER TABLETS]; TAKE 1 TABLET(20 MEQ) BY MOUTH DAILY  Dispense: 90 tablet; Refill: 0    If protocol passes may refill for 12 months if within 3 months of last provider visit (or a total of 15 months).                potassium chloride (K-DUR,KLOR-CON) 20 MEQ tablet [Pharmacy Med Name: POTASSIUM CL 20MEQ ER TABLETS] 90 tablet 3     Sig: TAKE 1 TABLET(20 MEQ) BY MOUTH DAILY       Potassium Supplements Refill Protocol Failed - 6/15/2020 12:51 PM        Failed - PCP or prescribing provider visit in past 12 months       Last office visit with prescriber/PCP: 4/12/2018 Anahi Aguilar MD OR same dept: Visit date not found OR same specialty: 5/18/2018 Kristina Marquez MD  Last  physical: 11/15/2018 Last MTM visit: Visit date not found   Next visit within 3 mo: Visit date not found  Next physical within 3 mo: Visit date not found  Prescriber OR PCP: Anahi Aguilar MD  Last diagnosis associated with med order: 1. Hyperlipidemia  - atorvastatin (LIPITOR) 20 MG tablet [Pharmacy Med Name: ATORVASTATIN 20MG TABLETS]; TAKE 1 AND 1/2 TABLETS BY MOUTH EVERY DAY  Dispense: 135 tablet; Refill: 0    2. Benign Essential Hypertension  - potassium chloride (K-DUR,KLOR-CON) 20 MEQ tablet [Pharmacy Med Name: POTASSIUM CL 20MEQ ER TABLETS]; TAKE 1 TABLET(20 MEQ) BY MOUTH DAILY  Dispense: 90 tablet; Refill: 0    If protocol passes may refill for 12 months if within 3 months of last provider visit (or a total of 15 months).             Failed - Potassium level in last 12 months     No results found for: LN-POTASSIUM

## 2021-06-08 NOTE — PROGRESS NOTES
Follow Up Blood Pressure Check    Migdalia Coronado is a 69 y.o. female recommended to follow up for blood pressure check by Anahi Aguilar MD. Anihypertensive medications and adherence were verified: Yes.     Reason for visit: Follow up from virtual visit    Medication change at last visit: No    Today's Vitals:   Vitals:    06/15/20 1336 06/15/20 1339   BP: 148/87 141/87   Pulse: 80 77       Home blood pressure readings brought in today:   NA    Lowest blood pressure today is less than 140/90 and they deny signs or symptoms of new onset: .  Please inform patient of his/her blood pressure today.  If they are asymptomatic, the patient is to continue current medications.  This message will be routed to their provider, and they will be notified if a change in medication is recommended.    ( may be deleted if not applicable) If lowest blood pressure is greater than 200/110, regardless if symptoms are present, patient needs to be evaluated by a provider today.    Esperanza Sampson    Current Outpatient Medications   Medication Sig Dispense Refill     aspirin 81 MG EC tablet Take 81 mg by mouth daily.       atorvastatin (LIPITOR) 20 MG tablet TAKE 1 1/2 TABLET BY MOUTH EVERY  tablet 0     fluticasone (FLONASE) 50 mcg/actuation nasal spray SHAKE LIQUID AND USE 2 SPRAYS IN EACH NOSTRIL DAILY (Patient taking differently: SHAKE LIQUID AND USE 2 SPRAYS IN EACH NOSTRIL DAILY AS NEEDED) 48 g 3     hydroCHLOROthiazide (HYDRODIURIL) 50 MG tablet TAKE 1 TABLET(50 MG) BY MOUTH DAILY 90 tablet 0     ibuprofen (ADVIL,MOTRIN) 800 MG tablet TAKE 1 TABLET BY MOUTH THREE TIMES DAILY AS NEEDED 90 tablet 0     metoprolol succinate (TOPROL-XL) 25 MG TAKE 1 TABLET(25 MG) BY MOUTH DAILY 90 tablet 1     potassium chloride (K-DUR,KLOR-CON) 20 MEQ tablet TAKE 1 TABLET(20 MEQ) BY MOUTH DAILY 90 tablet 0     No current facility-administered medications for this visit.

## 2021-06-08 NOTE — TELEPHONE ENCOUNTER
I spoke with patient and scheduled her a video visit for her med check and she added the annual wellness visit to it for 06/11/20 at 2:20PM with Dr Aguilar. She also has a med check scheduled for 09/03/20-FYI

## 2021-06-11 NOTE — TELEPHONE ENCOUNTER
RN cannot approve Refill Request    RN can NOT refill this medication med is not covered by policy/route to provider. Last office visit: 4/12/2018 Anahi Aguilar MD Last Physical: 9/3/2020 Last MTM visit: Visit date not found Last visit same specialty: 5/18/2018 Kristina Marquez MD.  Next visit within 3 mo: Visit date not found  Next physical within 3 mo: Visit date not found      Amanda Estrella, [facility] Triage/Med Refill 9/18/2020    Requested Prescriptions   Pending Prescriptions Disp Refills     ibuprofen (ADVIL,MOTRIN) 800 MG tablet 90 tablet 0     Sig: Take 1 tablet (800 mg total) by mouth 3 (three) times a day as needed.       There is no refill protocol information for this order

## 2021-06-11 NOTE — PROGRESS NOTES
[facility] Daily Progress     Patient Name: Migdalia Coronado  Date: 9/4/2020  Visit #: 2  PTA visit #:  0  Referral Diagnosis: M51.37 (ICD-10-CM) - Degeneration of lumbar or lumbosacral intervertebral disc   Referring provider: Anahi Aguilar MD  Visit Diagnosis:     ICD-10-CM    1. Degeneration of lumbar or lumbosacral intervertebral disc  M51.37    2. Sacro-iliac pain  M53.3         At the initial evaluation the patient presented with a pubic tubercle dysfunction and a right on left backward sacral dysfunction.  The patient also had a FRS right L4 today.  The patient had tightness in the lower abdomen right more than the left as well as tightness on the right adductors up high in the groin.  She has a limitation in her spine with both flexion and extension with extension causing pain.  The patient has poor posture in sitting and standing.  When sitting she has a tendency to cross her legs.  Assessment:   T12 and L1 right L5,4, left left high illiac crest   Right posterior STEPH.   listening to left just below the stomach proper  SI as well as lower abdomen and hip adductors.  Did manual therapy to lower stomach on the left side especially the psoas on the left side this corrected most of the spine rotations then just had a slight rotation on L5 to S1 and noted tightness between these 2 and did massage her then the spine was more neutral the patient felt more relaxed and her pain decreased to 3-4/10 pain after treatment today and she had an easier time with lumbar flexion with less pain.      Patient demonstrates understanding/independence with home program.  Patient is benefitting from skilled physical therapy and is making steady progress toward functional goals.    Goal Status:  Pt. will demonstrate/verbalize independence in self-management of condition in : 6 weeks;12 weeks;Comment  Comment:: To aid in home management of symptoms  Pt. will be independent with home exercise program in : Comment;6  weeks;12 weeks  Comment:: To aid in home management of symptoms  Pt. will have improved quality of sleep: waking less times/night;getting 75-90% of required amount;in 6 weeks;in 12 weeks;Comment  Comment:: patient will decrease her waking to 1-2 times per night due to pain.  Patient will transfer: supine/sit;sit/stand;for in/out of bed;for car;for toileting;for in/out of chair;with less pain;with less difficulty;in 6 weeks;in 12 weeks;Comment  Comment: decrease pain and difficulty of getting in and out of bed by 50%        Plan / Patient Education:   Plan for next visit: Please reassess the patient's spine and SI as well as lower abdomen and hip adductors.  Give patient instructions for sciatic nerve gliding as we talked about it last time but did not give instructions.  Go over posture and body mechanics especially avoiding the slightly bent over position.  Have patient demonstrate the transverse abdominus with heel slides and make sure that she is doing this exercise that she was given in the past correctly.  Assess dural mobility.  Continue with initial plan of care.  Progress with home program as tolerated.    Subjective:     Pain Ratin  No change.  Increased pain for 2 days after last visit and some pain with the exercises.  6/10 pain on the right sacral base to illium  Sometimes it will shoot on the inner thigh adductor region left.With sit to stand after sitting in the car 1 hour.    Occasionally posteriorly bilaterally entire leg.    Pain currently 3-4/10 pain after treatment today and she had an easier time with lumbar flexion with less pain.        Objective:   T12 and L1 right L5,4, left left high illiac crest   Right posterior STEPH.   listening to left just below the stomach proper  SI as well as lower abdomen and hip adductors.      Treatment Today   Exercises:  Exercise #1: standing tighten stomach and buttocks 5-10 seconds 5-10 reps 1-2 times per day  Exercise #2: brief verbal review of transverse  abdominus exercise with heel slides to look at next visit  Comment #2: supine nerve slider patient trialed but did not give her the sheet to give her next visit.  Exercise #3: patient to hold off of bridges as they were causing her to have increased pain  Comment #3: pelvic tilts as needed throughout the day to help to loosen up the spine  Exercise #4: Lay on your back flat focus on relaxing your stomach (psoas)  You may put a pillow under your hips if you don't feel a stretch when you are flat.  5 minutes as needed.      TREATMENT MINUTES COMMENTS   Evaluation     Self-care/ Home management     Manual therapy 50 Did manual therapy to lower stomach on the left side especially the psoas on the left side this corrected most of the spine rotations then just had a slight rotation on L5 to S1 and noted tightness between these 2 and did massage her then the spine was more neutral the patient felt more relaxed and her pain decreased to 3-4/10 pain after treatment today and she had an easier time with lumbar flexion with less pain   Neuromuscular Re-education     Therapeutic Activity     Therapeutic Exercises 8 Supine psoas stretch with and without a pillow under the hips   Gait training     Modality__________________                Total 58    Blank areas are intentional and mean the treatment did not include these items.       Zeynep Pugh PT  9/4/2020

## 2021-06-11 NOTE — PATIENT INSTRUCTIONS - HE
Lay on your back flat focus on relaxing your stomach (psoas)  You may put a pillow under your hips if you don't feel a stretch when you are flat.  5 minutes as needed.

## 2021-06-11 NOTE — PROGRESS NOTES
[facility] Daily Progress     Patient Name: Migdalia Coronado  Date: 9/25/2020  Visit #: 4  PTA visit #:  0  Referral Diagnosis: M51.37 (ICD-10-CM) - Degeneration of lumbar or lumbosacral intervertebral disc   Referring provider: Anahi Aguilar MD  Visit Diagnosis:     ICD-10-CM    1. Degeneration of lumbar or lumbosacral intervertebral disc  M51.37    2. Sacro-iliac pain  M53.3         At the initial evaluation the patient presented with a pubic tubercle dysfunction and a right on left backward sacral dysfunction.  The patient also had a FRS right L4 today.  The patient had tightness in the lower abdomen right more than the left as well as tightness on the right adductors up high in the groin.  She has a limitation in her spine with both flexion and extension with extension causing pain.  The patient has poor posture in sitting and standing.  When sitting she has a tendency to cross her legs.  Assessment:   Patient is leaning to the right shifting with the thorax and stomping down on the left slightly.  When instructed to she was able with verbal and tactile cues to keep her shoulders straight and avoid stomping down with her left foot.  She reports that it was tight pain with walking 1/10 pain when correctng the gait and then it slowly go looser down to 0.5.    L3 and L2 right rotated in standing right lower illiac crest.   T10 right and entire cervical spine right    General listening to top of head  Manual therapy to the saggital sutute the frontal bone the lateral head and the dural mobilizations to the upper portion of the cervical spine then did global listening to the patient and she had tightness in the mid thoracic region.  Instructed the patient in reach and roll not modified, pelvic clock 12-6 11-5 and 1-7 she was challanged with this but no pain.  Wall circles (hula hoop) as well as walking cues to keep her shoulders straight and avoid stomping down with her left foot.  After treatment  today her L2,3 was neutral.        Patient demonstrates understanding/independence with home program.  Patient is benefitting from skilled physical therapy and is making steady progress toward functional goals.    Goal Status:  Pt. will demonstrate/verbalize independence in self-management of condition in : 6 weeks;12 weeks;Comment  Comment:: To aid in home management of symptoms  Pt. will be independent with home exercise program in : Comment;6 weeks;12 weeks  Comment:: To aid in home management of symptoms  Pt. will have improved quality of sleep: waking less times/night;getting 75-90% of required amount;in 6 weeks;in 12 weeks;Comment  Comment:: patient will decrease her waking to 1-2 times per night due to pain.  Patient will transfer: supine/sit;sit/stand;for in/out of bed;for car;for toileting;for in/out of chair;with less pain;with less difficulty;in 6 weeks;in 12 weeks;Comment  Comment: decrease pain and difficulty of getting in and out of bed by 50%        Plan / Patient Education:   Plan for next visit: Please reassess the patient's spine and SI as well as lower abdomen and hip adductors.  Assess dural mobility.  Continue with initial plan of care.  Progress with home program as tolerated.    Subjective:     Pain Rating: 3  The patient reports that towards the end of the day she will have increased pain.  The patient reports that she had increased pain with shopping after 2 hours.    Objective:   Pain with walking 1/10 pain when correctng the gait and then it slowly go looser down to 0.5.    L3 and L2 right rotated in standing right lower illiac crest.   T10 right and entire cervical spine right    General listening to top of head  Manual therapy to the saggital sutute the frontal bone the lateral head and the dural mobilizations to the upper portion aof the crvical spine then did global listening to the patient and she had tightness in the mid thoracic region.      Treatment Today   Exercises:  Exercise  #1: standing tighten stomach and buttocks 5-10 seconds 5-10 reps 1-2 times per day  Exercise #2: brief verbal review of transverse abdominus exercise with heel slides  Comment #2: supine nerve slider patient  Exercise #3: patient to hold off of bridges as they were causing her to have increased pain  Comment #3: pelvic tilts as needed throughout the day to help to loosen up the spine, to do 12-6 11-5 and 1-7 monitoring the pelvis with her hands needed verbal and tactile cues today with this  Exercise #4: Lay on your back flat focus on relaxing your stomach (psoas)  You may put a pillow under your hips if you don't feel a stretch when you are flat.  5 minutes as needed.  Exercise #5: demonstrated properly with verbal cues supine transverse abdominus with heel slides  Comment #5: The patient was instructed in slump slider and straight leg slider that she can choose from gently 10 reps  Exercise #6: reach and roll not modified  Comment #6: gently back and forth 3 below 90 3 at 90 and 3 above 90  Exercise #7: anterior fascial line stretching hip flexor stretch standing and then raise arms up and hold, patient to do this in a corner for her balance  Exercise #8: Wall circles (hula hoop)  Comment #8: walking cues to keep her shoulders straight and avoid stomping down with her left foot  Exercise #9: Instructed the patient in reach and roll not modified,      TREATMENT MINUTES COMMENTS   Evaluation     Self-care/ Home management     Manual therapy 20 Manual therapy to the saggital sutute the frontal bone the lateral head and the dural mobilizations to the upper portion aof the cervical spine      Neuromuscular Re-education     Therapeutic Activity     Therapeutic Exercises 40  Instructed the patient in reach and roll not modified, pelvic clock 12-6 11-5 and 1-7 she was challanged with this but no pain.  Wall circles (hula hoop) as well as walking cues to keep her shoulders straight and avoid stomping down with her left  foot.  After treatment today her L2,3 was neutral. Modified  anterior fascial line stretching to do in the corner for balance   Gait training     Modality__________________                Total 60    Blank areas are intentional and mean the treatment did not include these items.       Zeynep Pugh PT  9/25/2020

## 2021-06-11 NOTE — PROGRESS NOTES
Assessment and Plan:     1. Routine general medical examination at a health care facility  Gynecologic Cytology (PAP Smear)    Influenza,Quad,High Dose,PF 65 YR+   2. Encounter for screening for cardiovascular disorders  CANCELED: US Abdominal Aorta   3. Benign essential hypertension  hydroCHLOROthiazide (HYDRODIURIL) 50 MG tablet    Ambulatory referral to PT/OT   4. Hyperlipidemia  Lipid Cascade FASTING    atorvastatin (LIPITOR) 20 MG tablet   5. Benign paroxysmal positional vertigo, unspecified laterality     6. Other hyperlipidemia     7. Benign Essential Hypertension  HM2(CBC w/o Differential)    Comprehensive Metabolic Panel    metoprolol succinate (TOPROL XL) 25 MG   8. Vitamin D deficiency  Vitamin D, Total (25-Hydroxy)   9. Lumbar Disc Degeneration  celecoxib (CELEBREX) 100 MG capsule   10. Atypical nevi  Ambulatory referral to Dermatology   11. Encounter for gynecological examination (general) (routine) with abnormal findings   Gynecologic Cytology (PAP Smear)     Discuss abdominal aortic ultrasound to screen for aneurysm.  Patient declines today.    Referral placed for vestibular occupational therapy because of benign positional vertigo.    I appointment set for today.    We will give dermatology consult for full body skin check.  [facility]  PHONE: [phone]    Would you saw cardiology and had work-up for palpitations or extra heartbeats the monitor did show frequent extra beats.  Since this is not bothering you and have you and you have not used the metoprolol in a long time we will discontinue that medication.    Seeing [facility] for back pain.    Refilled Celebrex 100 mg twice a day as needed for back pain.    With mildly elevated blood pressure today we will have you restart metoprolol 25 mg every day at  Bedtime.    Be sure you are seeing an ophthalmologist which is an MD specializing in eye care.  Please ask them if there is any concerns of a skin cancer on your eye since  that was mentioned by your previous eye doctor.      The patient's current medical problems were reviewed.    I have had an Advance Directives discussion with the patient.  The following health maintenance schedule was reviewed with the patient and provided in printed form in the after visit summary:   Health Maintenance   Topic Date Due     HEPATITIS C SCREENING  NA     ZOSTER VACCINES (1 of 2) If you are interested in the new shingles shot, Shingrix, please check with insurance for coverage either in clinic or at a pharmacy. It is a 2 shot series 2-6 months apart.   Declined today.     DXA SCAN  Due, declined today     FALL RISK ASSESSMENT  Steady     MEDICARE ANNUAL WELLNESS VISIT  Today     PNEUMOCOCCAL IMMUNIZATION 65+ LOW/MEDIUM RISK (2 of 2 - PPSV23) Today     INFLUENZA VACCINE RULE BASED (1) Today     COLORECTAL CANCER SCREENING  10/07/2021     MAMMOGRAM  08/26/2021     LIPID  Today     ADVANCE CARE PLANNING  Packet given     TD 18+ HE  08/24/2025     HEPATITIS B VACCINES  Declined      Pap-Today    Subjective:   Chief Complaint: Migdalia Coronado is an 70 y.o. female here for an Annual Wellness visit.   HPI:        Dizzy:  Started in Dec.  With rolling in bed. Saw PT for back and they recc vestib OT.    Eyes: At her eye exam she was told that she possibly had skin cancer in her eye?  The provider she saw did not elaborate.  She is seeing a different eye doctor this year and will ask further questions.      History of palpitations: Patient did have a Holter which showed frequent PVCs.  She did see cardiology was given metoprolol to use if needed.  She does not use that quite some time and the palpitations have resolved.      Hypertension:  Patient's blood pressure has been a bit elevated.  She is currently on hydrochlorothiazide 50 mg.  She has not taken metoprolol in some time but agreeable to restart it specifically for blood pressure not palpitations.    Review of Systems:     Please see above.  The rest of  the review of systems are negative for all systems.    Patient Care Team:  Anahi Aguilar MD as PCP - General  Anahi Aguilar MD as Assigned PCP     Patient Active Problem List   Diagnosis     Esophageal Reflux     Hyperactivity Of The Bladder     Postmenopausal Atrophic Vaginitis     Lumbar Disc Degeneration     Disease Of The Nails     Vitamin D Deficiency     Other hyperlipidemia     Benign Essential Hypertension     Constipation     Hypercalcemia     Hypokalemia     Anxiety     Abnormal mammogram, unspecified     Abnormal Pap smear of cervix     Benign neoplasm of ascending colon     Encounter for screening for malignant neoplasm of colon     Family history of colonic polyps     Past Medical History:   Diagnosis Date     HTN (hypertension)       Past Surgical History:   Procedure Laterality Date     BREAST BIOPSY       CHOLECYSTECTOMY  2008     RI APPENDECTOMY       RI LIGATE FALLOPIAN TUBE        Family History   Problem Relation Age of Onset     Stomach cancer Mother      Lung cancer Father      Multiple myeloma Brother      Colon polyps Son      Colon cancer Nephew      Stroke Maternal Grandmother      Breast cancer Neg Hx       Social History     Socioeconomic History     Marital status:      Spouse name: Not on file     Number of children: Not on file     Years of education: Not on file     Highest education level: Not on file   Occupational History     Not on file   Social Needs     Financial resource strain: Not on file     Food insecurity     Worry: Not on file     Inability: Not on file     Transportation needs     Medical: Not on file     Non-medical: Not on file   Tobacco Use     Smoking status: Former Smoker     Types: Cigarettes     Smokeless tobacco: Never Used   Substance and Sexual Activity     Alcohol use: Yes     Alcohol/week: 1.0 standard drinks     Types: 1 Glasses of wine per week     Comment: occasionally     Drug use: No     Sexual activity: Not Currently     Partners:  "Male   Lifestyle     Physical activity     Days per week: Not on file     Minutes per session: Not on file     Stress: Not on file   Relationships     Social connections     Talks on phone: Not on file     Gets together: Not on file     Attends Sikhism service: Not on file     Active member of club or organization: Not on file     Attends meetings of clubs or organizations: Not on file     Relationship status: Not on file     Intimate partner violence     Fear of current or ex partner: Not on file     Emotionally abused: Not on file     Physically abused: Not on file     Forced sexual activity: Not on file   Other Topics Concern     Not on file   Social History Narrative    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        Kristina Marquez MD  5/18/2018          Current Outpatient Medications   Medication Sig Dispense Refill     aspirin 81 MG EC tablet Take 81 mg by mouth daily.       atorvastatin (LIPITOR) 20 MG tablet TAKE 1 AND 1/2 TABLETS BY MOUTH EVERY  tablet 3     fluticasone (FLONASE) 50 mcg/actuation nasal spray SHAKE LIQUID AND USE 2 SPRAYS IN EACH NOSTRIL DAILY (Patient taking differently: SHAKE LIQUID AND USE 2 SPRAYS IN EACH NOSTRIL DAILY AS NEEDED) 48 g 3     hydroCHLOROthiazide (HYDRODIURIL) 50 MG tablet TAKE 1 TABLET(50 MG) BY MOUTH DAILY 90 tablet 0     ibuprofen (ADVIL,MOTRIN) 800 MG tablet TAKE 1 TABLET BY MOUTH THREE TIMES DAILY AS NEEDED 90 tablet 0     potassium chloride (K-DUR,KLOR-CON) 20 MEQ tablet TAKE 1 TABLET(20 MEQ) BY MOUTH DAILY 90 tablet 3     metoprolol succinate (TOPROL-XL) 25 MG TAKE 1 TABLET(25 MG) BY MOUTH DAILY 90 tablet 1     No current facility-administered medications for this visit.       Objective:   Vital Signs:   Visit Vitals  /85 (Patient Site: Left Arm, Patient Position: Sitting, Cuff Size: Adult Large)   Pulse 71   Temp 97.1  F (36.2  C) (Oral)   Ht 5' 6.75\" (1.695 m)   Wt 205 lb 8 oz (93.2 kg)   BMI 32.43 kg/m     " "      VisionScreening:  No exam data present     PHYSICAL EXAM  /84   Pulse 71   Temp 97.1  F (36.2  C) (Oral)   Ht 5' 6.75\" (1.695 m)   Wt 205 lb 8 oz (93.2 kg)   BMI 32.43 kg/m      General Appearance:    Alert, cooperative, no distress, appears stated age   Head:    Normocephalic, without obvious abnormality, atraumatic   Eyes:    PERRL, conjunctiva/corneas clear, EOM's intact, fundi     benign, both eyes   Ears:    Normal TM's and external ear canals, both ears   Nose:   Nares normal, septum midline, mucosa normal, no drainage     or sinus tenderness   Throat:   Lips, mucosa, and tongue normal; teeth and gums normal   Neck:   Supple, symmetrical, trachea midline, no adenopathy;     thyroid:  no enlargement/tenderness/nodules; no carotid    bruit or JVD   Back:     Symmetric, no curvature, ROM normal, no CVA tenderness   Lungs:     Clear to auscultation bilaterally, respirations unlabored   Chest Wall:    No tenderness or deformity    Heart:    Regular rate and rhythm, S1 and S2 normal, no murmur, rub    or gallop   Breast Exam:    No tenderness, masses, or nipple abnormality   Abdomen:     Soft, non-tender, bowel sounds active all four quadrants,     no masses, no organomegaly   Genitalia:    Normal female without lesion, discharge or tenderness, unable to palpate uterus and ovaries secondary to body habitus       Extremities:   Extremities normal, atraumatic, no cyanosis or edema   Pulses:   2+ and symmetric all extremities   Skin:   Skin color, texture, turgor normal, no rashes or lesions   Lymph nodes:   Cervical, supraclavicular, and axillary nodes normal   Neurologic:   CNII-XII intact, normal strength, sensation and reflexes     throughout       Assessment Results 9/3/2020   Activities of Daily Living No help needed   Instrumental Activities of Daily Living No help needed   Mini Cog Total Score -   Some recent data might be hidden     A Mini-Cog score of 0-2 suggests the possibility of dementia, " score of 3-5 suggests no dementia  Falls risk and mini cog done see flowsheet    Identified Health Risks:     The patient was counseled and encouraged to consider modifying their diet and eating habits. She was provided with information on recommended healthy diet options.  Information regarding advance directives (living herrera), including where she can download the appropriate form, was provided to the patient via the AVS.

## 2021-06-11 NOTE — PROGRESS NOTES
[facility] Daily Progress     Patient Name: Migdalia Coroando  Date: 9/11/2020  Visit #: 3  PTA visit #:  0  Referral Diagnosis: M51.37 (ICD-10-CM) - Degeneration of lumbar or lumbosacral intervertebral disc   Referring provider: Anahi Aguilar MD  Visit Diagnosis:     ICD-10-CM    1. Degeneration of lumbar or lumbosacral intervertebral disc  M51.37    2. Sacro-iliac pain  M53.3         At the initial evaluation the patient presented with a pubic tubercle dysfunction and a right on left backward sacral dysfunction.  The patient also had a FRS right L4 today.  The patient had tightness in the lower abdomen right more than the left as well as tightness on the right adductors up high in the groin.  She has a limitation in her spine with both flexion and extension with extension causing pain.  The patient has poor posture in sitting and standing.  When sitting she has a tendency to cross her legs.  Assessment:     Tight dura throughout the spine.  The patient was especially tight in the sacral region and lower thoracic region as well as throughout the spine right and left more than superior and inferior.    Right rotated L3,4,5,T12,T11 in flexion and Right rotated L3,4,5 T12, T11 in standing straight.    Tight abdominals on the right as well as throughout the abdomen.  Did MFR to help with this.  FRS and ERS right L3 did MET to correct this  After manual therapy the spine was about 80% corrected.  The patient was instructed in slump slider and straight leg slider that she can choose from as well as she was instructed and demonstrated properly with verbal cues supine transverse abdominus with heel slides      Patient demonstrates understanding/independence with home program.  Patient is benefitting from skilled physical therapy and is making steady progress toward functional goals.    Goal Status:  Pt. will demonstrate/verbalize independence in self-management of condition in : 6 weeks;12  weeks;Comment  Comment:: To aid in home management of symptoms  Pt. will be independent with home exercise program in : Comment;6 weeks;12 weeks  Comment:: To aid in home management of symptoms  Pt. will have improved quality of sleep: waking less times/night;getting 75-90% of required amount;in 6 weeks;in 12 weeks;Comment  Comment:: patient will decrease her waking to 1-2 times per night due to pain.  Patient will transfer: supine/sit;sit/stand;for in/out of bed;for car;for toileting;for in/out of chair;with less pain;with less difficulty;in 6 weeks;in 12 weeks;Comment  Comment: decrease pain and difficulty of getting in and out of bed by 50%        Plan / Patient Education:   Plan for next visit: Please reassess the patient's spine and SI as well as lower abdomen and hip adductors.  Assess dural mobility.  Continue with initial plan of care.  Progress with home program as tolerated.    Subjective:     Pain Ratin    Yesterday pain 9/10 after walking at  [place].          Objective:   FRS and ERS right L3  Tight dura throughout the spine    Right rotated L3,4,5,T12,T11 in flexion and Right rotated L3,4,5 T12, T11 in standing straight.    Tight abdominals on the right as well as throughout the abdomen.        T12 and L1 right L5,4, left left high illiac crest   Right posterior STEPH.   listening to left just below the stomach proper  SI as well as lower abdomen and hip adductors.      Treatment Today   Exercises:  Exercise #1: standing tighten stomach and buttocks 5-10 seconds 5-10 reps 1-2 times per day  Exercise #2: brief verbal review of transverse abdominus exercise with heel slides to look at next visit  Comment #2: supine nerve slider patient trialed but did not give her the sheet to give her next visit.  Exercise #3: patient to hold off of bridges as they were causing her to have increased pain  Comment #3: pelvic tilts as needed throughout the day to help to loosen up the spine  Exercise #4: Lay on your back  flat focus on relaxing your stomach (psoas)  You may put a pillow under your hips if you don't feel a stretch when you are flat.  5 minutes as needed.  Exercise #5: demonstrated properly with verbal cues supine transverse abdominus with heel slides  Comment #5: The patient was instructed in slump slider and straight leg slider that she can choose from gently 10 reps      TREATMENT MINUTES COMMENTS   Evaluation     Self-care/ Home management     Manual therapy 50 Tight dura throughout the spine. Did manual dural mobilization The patient was especially tight in the sacral region and lower thoracic region as well as throughout the spine right and left more than superior and inferior.    Tight abdominals on the right as well as throughout the abdomen.  Did MFR to help with this.  FRS and ERS right L3 did MET to correct this   Neuromuscular Re-education     Therapeutic Activity     Therapeutic Exercises 8 The patient was instructed in slump slider and straight leg slider that she can choose from as well as she was instructed and demonstrated properly with verbal cues supine transverse abdominus with heel slides     Gait training     Modality__________________                Total 58    Blank areas are intentional and mean the treatment did not include these items.       Zeynep Pugh PT  9/11/2020

## 2021-06-11 NOTE — PROGRESS NOTES
[facility] Daily Progress     Patient Name: Migdalia Coronado  Date: 9/18/2020  Visit #: 4  PTA visit #:  0  Referral Diagnosis: M51.37 (ICD-10-CM) - Degeneration of lumbar or lumbosacral intervertebral disc   Referring provider: Anahi Aguilar MD  Visit Diagnosis:     ICD-10-CM    1. Degeneration of lumbar or lumbosacral intervertebral disc  M51.37    2. Sacro-iliac pain  M53.3         At the initial evaluation the patient presented with a pubic tubercle dysfunction and a right on left backward sacral dysfunction.  The patient also had a FRS right L4 today.  The patient had tightness in the lower abdomen right more than the left as well as tightness on the right adductors up high in the groin.  She has a limitation in her spine with both flexion and extension with extension causing pain.  The patient has poor posture in sitting and standing.  When sitting she has a tendency to cross her legs.  Assessment:   Tight anterior portion of the L4 regions of the spine.  Did deep MFR to get to this area and then the L4  Vertebra was neutral in both flexion and extension.  There was still tightness in the paraspinals above L4 but no rotation in standing.    Moderately tight dura throughout the spine.  Better in the sacral region and lower back region lower thoracic spine was tight today.       Patient demonstrates understanding/independence with home program.  Patient is benefitting from skilled physical therapy and is making steady progress toward functional goals.    Goal Status:  Pt. will demonstrate/verbalize independence in self-management of condition in : 6 weeks;12 weeks;Comment  Comment:: To aid in home management of symptoms  Pt. will be independent with home exercise program in : Comment;6 weeks;12 weeks  Comment:: To aid in home management of symptoms  Pt. will have improved quality of sleep: waking less times/night;getting 75-90% of required amount;in 6 weeks;in 12 weeks;Comment  Comment:: patient  will decrease her waking to 1-2 times per night due to pain.  Patient will transfer: supine/sit;sit/stand;for in/out of bed;for car;for toileting;for in/out of chair;with less pain;with less difficulty;in 6 weeks;in 12 weeks;Comment  Comment: decrease pain and difficulty of getting in and out of bed by 50%        Plan / Patient Education:   Plan for next visit: Please reassess the patient's spine and SI as well as lower abdomen and hip adductors.  Assess dural mobility.  Continue with initial plan of care.  Progress with home program as tolerated.    Subjective:     Pain Ratin    L4 right rotated in standing and left rotated in flexion.                Objective:   L4 right rotated in standing and left rotated in flexion.    The patient was able to progress HEP well.   Reach and roll exercise and anterior fascial line stretching with hands over her head.       Treatment Today   Exercises:  Exercise #1: standing tighten stomach and buttocks 5-10 seconds 5-10 reps 1-2 times per day  Exercise #2: brief verbal review of transverse abdominus exercise with heel slides  Comment #2: supine nerve slider patient  Exercise #3: patient to hold off of bridges as they were causing her to have increased pain  Comment #3: pelvic tilts as needed throughout the day to help to loosen up the spine  Exercise #4: Lay on your back flat focus on relaxing your stomach (psoas)  You may put a pillow under your hips if you don't feel a stretch when you are flat.  5 minutes as needed.  Exercise #5: demonstrated properly with verbal cues supine transverse abdominus with heel slides  Comment #5: The patient was instructed in slump slider and straight leg slider that she can choose from gently 10 reps  Exercise #6: reach and roll  Comment #6: gently back and forth 3 below 90 3 at 90 and 3 above 90  Exercise #7: anterior fascial line stretching hip flexor stretch standing and then raise arms up and hold      TREATMENT MINUTES COMMENTS    Evaluation     Self-care/ Home management     Manual therapy 40 Tight anterior portion of the L4 regions of the spine.  Did deep MFR to get to this area and then the L4  Vertebra was neutral in both flexion and extension.  There was still tightness in the paraspinals above L4 but no rotation in standing.       Neuromuscular Re-education     Therapeutic Activity     Therapeutic Exercises 15 Reach and roll and anterior fascial line stretching   Gait training     Modality__________________                Total 55    Blank areas are intentional and mean the treatment did not include these items.       Zeynep Pugh PT  9/18/2020

## 2021-06-12 NOTE — PROGRESS NOTES
[facility] Daily Progress     Patient Name: Migdalia Coronado  Date: 10/2/2020  Visit #: 5  PTA visit #:  0  Referral Diagnosis: M51.37 (ICD-10-CM) - Degeneration of lumbar or lumbosacral intervertebral disc   Referring provider: Anahi Aguilar MD  Visit Diagnosis:     ICD-10-CM    1. Degeneration of lumbar or lumbosacral intervertebral disc  M51.37    2. Sacro-iliac pain  M53.3         At the initial evaluation the patient presented with a pubic tubercle dysfunction and a right on left backward sacral dysfunction.  The patient also had a FRS right L4 today.  The patient had tightness in the lower abdomen right more than the left as well as tightness on the right adductors up high in the groin.  She has a limitation in her spine with both flexion and extension with extension causing pain.  The patient has poor posture in sitting and standing.  When sitting she has a tendency to cross her legs.  Assessment:   The patient is feeling much better and will return in 1 month.  Able to progress to hip strength and core strength. Still slightly hitting hard on both feet with walking patient is to work on gliding.  Spine is neutral in standing.  Patient was instructed in proper posture in sitting and what cushions fit her.  Discussed proper body mechanics with lifting, dishes, vacuuming, dusting.  Discussed entire HEP.          Patient demonstrates understanding/independence with home program.  Patient is benefitting from skilled physical therapy and is making steady progress toward functional goals.    Goal Status:  Pt. will demonstrate/verbalize independence in self-management of condition in : 6 weeks;12 weeks;Comment;Met  Comment:: To aid in home management of symptoms  Pt. will be independent with home exercise program in : Comment;6 weeks;12 weeks;Met  Comment:: To aid in home management of symptoms  Pt. will have improved quality of sleep: waking less times/night;getting 75-90% of required amount;in 6  weeks;in 12 weeks;Comment;Met  Comment:: patient will decrease her waking to 1-2 times per night due to pain.  Not up due to pain.  Patient will transfer: supine/sit;sit/stand;for in/out of bed;for car;for toileting;for in/out of chair;with less pain;with less difficulty;in 6 weeks;in 12 weeks;Comment;Met  Comment: decrease pain and difficulty of getting in and out of bed by 50%, met no longer having pain getting in and out of bed        Plan / Patient Education:   Plan for next visit: Please reassess the patient's spine and SI as well as lower abdomen and hip adductors.  Assess dural mobility.  Continue with initial plan of care.  Progress with home program as tolerated.    Subjective:     Pain Rating: 3 currently  The patient reports that her AM pain is a 4/10 was a 10/10.  Used to not be able to get the ice pack out of the refrigerator.  Used to have to get the icepack in the AM now don't always need the ice pack to get out of bed.    0/10 standing sitting 2-3/10 pain after sitting 5 minutes pain on the right PSIS and sacral base.    Pain with walking 10 minutes or longer.  Today with walking when she goes a little faster then her arms swing and her back moves properly.      Objective:   Lumbar flexion 1/4 left of shin  Lumbar extension moderate movement loss pinching in the low back SI.    Spine is neutral in standing.              Treatment Today   Exercises:  Exercise #1: standing tighten stomach and buttocks 5-10 seconds 5-10 reps 1-2 times per day  Exercise #2: brief verbal review of transverse abdominus exercise with heel slides  Comment #2: supine nerve slider patient  Exercise #3: patient to hold off of bridges as they were causing her to have increased pain  Comment #3: pelvic tilts as needed throughout the day to help to loosen up the spine, to do 12-6 11-5 and 1-7 monitoring the pelvis with her hands needed verbal and tactile cues today with this  Exercise #4: Lay on your back flat focus on relaxing  your stomach (psoas)  You may put a pillow under your hips if you don't feel a stretch when you are flat.  5 minutes as needed.  Exercise #5: demonstrated properly with verbal cues supine transverse abdominus with heel slides  Comment #5: The patient was instructed in slump slider and straight leg slider that she can choose from gently 10 reps  Exercise #6: reach and roll not modified  Comment #6: gently back and forth 3 below 90 3 at 90 and 3 above 90  Exercise #7: anterior fascial line stretching hip flexor stretch standing and then raise arms up and hold, patient to do this in a corner for her balance  Exercise #8: Wall circles (hula hoop)  Comment #8: walking cues to keep her shoulders straight and avoid stomping down with her left foot  Exercise #9: Instructed the patient in reach and roll not modified,      TREATMENT MINUTES COMMENTS   Evaluation     Self-care/ Home management     Manual therapy       Neuromuscular Re-education 20 Patient was instructed in proper posture in sitting and what cushions fit her.  Discussed proper body mechanics with lifting, dishes, vacuuming, dusting.   Therapeutic Activity     Therapeutic Exercises 40  discussed in detail HEP and when to do what.  Progressed to supine stabilization, prone over a bed hip extension with or without knee flexion, standing hip abduction with a band above the knees   Gait training     Modality__________________                Total 60    Blank areas are intentional and mean the treatment did not include these items.       Zeynep Pugh PT  10/2/2020

## 2021-06-13 NOTE — PROGRESS NOTES
Discharge Summary  Patient Name: Migdalia Coronado  Date: 11/13/2020  Referral Diagnosis: vertigo  Referring provider: Anahi Aguilar MD  Visit Diagnosis:   1. Benign paroxysmal positional vertigo, right     2. Unsteadiness     3. Decreased activities of daily living (ADL)         Goal status: goals met    Patient was seen for 1 visit. The patient will need a new referral to resume.    Thank you for your referral.  Yue Gonzales  11/13/2020  7:38 PM

## 2021-06-16 PROBLEM — Z83.719 FAMILY HISTORY OF COLONIC POLYPS: Status: ACTIVE | Noted: 2018-05-18

## 2021-06-16 NOTE — PATIENT INSTRUCTIONS - HE
Physical due in September.    If you are interested in the new shingles shot, Shingrix, please check with insurance for coverage either in clinic or at a pharmacy. It is a 2 shot series 2-6 months apart.     Declined bone density.    Mammogram due in August.    Colonoscopy due this October, we can order at your physical in September.    Recommend healthy diet and exercise.    To see Derm for skin check.  [facility]  PHONE: [phone]    Chest x-ray with rib views on the left will be done today.  We will send a [employer] message with the radiology read.

## 2021-06-16 NOTE — PROGRESS NOTES
"    Assessment & Plan     Migdalia was seen today for hypertension.    Diagnoses and all orders for this visit:    Rib pain  -     XR Ribs Left W PA Chest; Future    Other hyperlipidemia    Benign Essential Hypertension  -     Potassium    Physical due in September.    If you are interested in the new shingles shot, Shingrix, please check with insurance for coverage either in clinic or at a pharmacy. It is a 2 shot series 2-6 months apart.     Declined bone density.    Mammogram due in August.    Colonoscopy due this October, we can order at your physical in September.    Recommend healthy diet and exercise.    To see Derm for skin check.  [facility]  PHONE: [phone]    Chest x-ray with rib views on the left will be done today.  We will send a [employer] message with the radiology read.          20 minutes spent on the date of the encounter doing chart review, history and exam, documentation and further activities as noted above       BMI:   Estimated body mass index is 32.88 kg/m  as calculated from the following:    Height as of 9/3/20: 5' 6.75\" (1.695 m).    Weight as of this encounter: 208 lb 6 oz (94.5 kg).           Return in about 6 months (around 2021) for Annual physical.    Anahi Aguilar MD  [facility]    Subjective   Migdalia Coronado is 70 y.o. and presents today for the following health issues   HPI   Hypertension:  Contolled on meds.    Left lower rib cage pain:  Feels on the rib OVer a year, had left lower anterior rib pian, hurst to push on it and with just sitting there.on and off Not getting worsw, has flair ups and enough to anoy her  Not noticed a lump.  Describes as an ache.  Happens weekly.      Hx of BPPV:  On time of vestib OT and gone.    Social:  Their bulldog  recently.  They now have a new 2 month old bulldog.              Objective    /80 (Patient Site: Left Arm, Patient Position: Sitting, Cuff Size: Adult Regular)   Pulse 64  "  Temp 97.4  F (36.3  C) (Oral)   Resp 16   Wt 208 lb 6 oz (94.5 kg)   BMI 32.88 kg/m    Body mass index is 32.88 kg/m .  Physical Exam  General: No apparent distress  Heart: Regular rate and rhythm without murmur  Lungs: Clear to auscultation bilaterally  Musculoskeletal: Some pain to palpation of the left anterior distal floating ribs no palpable abnormality

## 2021-06-17 NOTE — PROGRESS NOTES
ASSESSMENT & PLAN:  1. Benign essential hypertension  hydroCHLOROthiazide (HYDRODIURIL) 50 MG tablet    Ambulatory referral to Ophthalmology    Comprehensive Metabolic Panel    HM2(CBC w/o Differential)   2. Visit for screening mammogram  Mammo Screening Bilateral   3. Hyperlipidemia  atorvastatin (LIPITOR) 20 MG tablet    Lipid Cascade   4. Lumbar Disc Degeneration  cyclobenzaprine (FLEXERIL) 10 MG tablet   5. Benign Essential Hypertension  potassium chloride SA (K-DUR,KLOR-CON) 20 MEQ tablet   6. Post-nasal drip  fluticasone (FLONASE) 50 mcg/actuation nasal spray   7. Vitamin D deficiency  Vitamin D, Total (25-Hydroxy)       Patient Instructions   Follow in 6 months for a physical. Come fasting.     Dermatology due 2/19    If you are interested in the new shingles shot, Shingrix, please check with insurance for coverage either in clinic or at a pharmacy. It is a 2 shot series 6 months apart.     Recommend up to 30 g of fiber per day.  Could add on Colace 100-400 mg daily as needed for constipation.  Could add MiraLAX nightly as directed if needed.    Schedule Mammogram.     Labs as ordered.     Continue physical therapy through [facility].       Orders Placed This Encounter   Procedures     Mammo Screening Bilateral     Standing Status:   Future     Standing Expiration Date:   7/12/2019     Order Specific Question:   Patient's previous breast density:     Answer:   Heterogeneously dense [3]     Order Specific Question:   Can the procedure be changed per Radiologist protocol?     Answer:   Yes     Comprehensive Metabolic Panel     Lipid Cascade     Order Specific Question:   Fasting is required?     Answer:   Yes     HM2(CBC w/o Differential)     Vitamin D, Total (25-Hydroxy)     Ambulatory referral to Ophthalmology     Referral Priority:   Routine     Referral Type:   Consultation     Referral Reason:   Evaluation and Treatment     Requested Specialty:   Ophthalmology     Number of Visits Requested:   1      Nursing communication     Will you have pt do DANIKA for [facility] notes? thx!     Medications Discontinued During This Encounter   Medication Reason     linaclotide (LINZESS) 145 mcg cap capsule Therapy completed     atorvastatin (LIPITOR) 20 MG tablet Reorder     cyclobenzaprine (FLEXERIL) 10 MG tablet Reorder     hydroCHLOROthiazide (HYDRODIURIL) 50 MG tablet Reorder     ibuprofen (ADVIL,MOTRIN) 800 MG tablet Reorder     potassium chloride SA (K-DUR,KLOR-CON) 20 MEQ tablet Reorder     fluticasone (FLONASE) 50 mcg/actuation nasal spray Reorder       Return in about 6 months (around 10/12/2018) for Annual physical.    CHIEF COMPLAINT:  Chief Complaint   Patient presents with     Medication Refill     Medication Check       HISTORY OF PRESENT ILLNESS:  Migdalia is a 67 y.o. female presenting to the clinic today for medication check.     Benign Essential Hypertension: Blood pressure in clinic was 128/84. She denies chest pain and SOB with exercise. She denies edema in her feet. She reports no adverse side effects to medication.     Knee Pain: She had difficulty walking in November and received a referral to [facility]. At [facility], she was told she had injured her IT band. She started physical therapy and symptoms have resolved. In the last two weeks, her knee has started hurting again but in a different area and has pain when she sits down. She has restarted physical therapy.      Lumbar Disc Degeneration: She denies adverse side effects to medication.     URI: On 1/21/18 she presented to [facility] for SOB and throat closing sensations. Chest Xray on 1/12/18 was negative. She was given a nebulizer and symptoms have since resolved.  At the ER she was unable to take blood pressure medication and had a BP of 195/105. She denies wheezing or lingering SOB.     Hypercalcemia: Calcium was 10.6 on 6/19/13. Recently, her calcium was 9.8 on 1/31/17. PTH on 02/23/15 was 76.     Irritable Bowel Syndrome: She notes  symptoms have resolved. She took Linzess, however, experienced stomach pain. She is amenable to eating more fiber.       REVIEW OF SYSTEMS:   She follows with [facility]. She follows with dermatology consultants.Colonoscopy on 10/07/2016 was normal.  All other systems are negative.    PFSH:  Family: She has a son.     TOBACCO USE:  History   Smoking Status     Never Smoker   Smokeless Tobacco     Never Used       VITALS:  Vitals:    04/12/18 1050   BP: 128/84   Patient Site: Left Arm   Patient Position: Sitting   Cuff Size: Adult Large   Pulse: 72   Resp: 16   Temp: 98.5  F (36.9  C)   TempSrc: Oral   Weight: 220 lb (99.8 kg)     Wt Readings from Last 3 Encounters:   04/12/18 220 lb (99.8 kg)   01/11/18 200 lb (90.7 kg)   01/25/17 209 lb 2 oz (94.9 kg)     Body mass index is 34.72 kg/(m^2).    PHYSICAL EXAM:  /84 (Patient Site: Left Arm, Patient Position: Sitting, Cuff Size: Adult Large)  Pulse 72  Temp 98.5  F (36.9  C) (Oral)   Resp 16  Wt 220 lb (99.8 kg)  BMI 34.72 kg/m2  General appearance: alert, appears stated age and cooperative  Lungs: clear to auscultation bilaterally  Heart: regular rate and rhythm, S1, S2 normal, no murmur, click, rub or gallop  Neurologic: Grossly normal  Psych: Normal Mood and Affect.     QUALITY MEASURES:  ADDITIONAL HISTORY SUMMARIZED (2): Reviewed Dermatology notes from 02/07/17 no abnormalities. Reviewed Colonoscopy notes 10/07/2016 no abnormalities return in 10 years.   DECISION TO OBTAIN EXTRA INFORMATION (1): DANIKA to [facility].   RADIOLOGY TESTS (1): None.  LABS (1): Reviewed PTH 2/23/15 normal. Reviewed CMP 1/31/17 normal calcium.  MEDICINE TESTS (1): None.  INDEPENDENT REVIEW (2 each): None.       The visit lasted a total of 21 minutes face to face with the patient. Over 50% of the time was spent counseling and educating the patient about medication check.     Donna WILSON, am scribing for and in the presence of, Dr. Anahi Aguilar.    I,  Dr. Anahi Aguilar MD  , personally performed the services described in this documentation, as scribed by Donna Reza in my presence, and it is both accurate and complete.    MEDICATIONS:  Current Outpatient Prescriptions   Medication Sig Dispense Refill     atorvastatin (LIPITOR) 20 MG tablet TAKE 1 1/2 TABLET BY MOUTH EVERY  tablet 3     cyclobenzaprine (FLEXERIL) 10 MG tablet 1 tab at bedtime as needed for back 30 tablet 3     hydroCHLOROthiazide (HYDRODIURIL) 50 MG tablet Take 1 tablet (50 mg total) by mouth daily. 90 tablet 1     ibuprofen (ADVIL,MOTRIN) 800 MG tablet TAKE 1 TABLET BY MOUTH THREE TIMES DAILY AS NEEDED 90 tablet 3     potassium chloride SA (K-DUR,KLOR-CON) 20 MEQ tablet Take 1 tablet (20 mEq total) by mouth daily. 90 tablet 3     fluticasone (FLONASE) 50 mcg/actuation nasal spray 2 sprays into each nostril daily. 17 g 3     No current facility-administered medications for this visit.        Total data points: 4

## 2021-06-18 NOTE — LETTER
Letter by Anahi Aguilar MD at      Author: Anahi Aguilar MD Service: -- Author Type: --    Filed:  Encounter Date: 1/14/2019 Status: (Other)       Migdalia Coronado  [address] [place] [address]      January 14, 2019      Dear Ms. Coronado,     At [facility], we care about your health and well-being. Your primary care provider is committed to ensuring you receive high quality care and has chosen a network of specialists to assist in providing that care. Recently Dr. Aguilar referred you to [facility] for specialty care.        It is important to your overall health to follow through with the recommendation from your provider. Please call [phone] 0at your earliest convenience to schedule aan appointment.  If you have already scheduled this appointment, please disregard this notice.        Sincerely,        Electronically signed by Anahi Aguilar MD

## 2021-06-18 NOTE — PATIENT INSTRUCTIONS - HE
Patient Instructions by Zeynep Pugh PT at 9/11/2020 11:00 AM     Author: Zeynep Pugh PT Service: -- Author Type: Physical Therapist    Filed: 9/11/2020 12:29 PM Encounter Date: 9/11/2020 Status: Addendum    : Zeynep Pugh PT (Physical Therapist)    Related Notes: Original Note by Zeynep Pugh PT (Physical Therapist) filed at 9/11/2020 11:19 AM         Sit on the edge of a table or chair.  Grab behind your knee.  Slowly extend your leg, as you extend your leg tip your head back.  Extend your knee as far as you can.  Do not hold.  Slowly lower your leg and tip your head forward.        Lie on your back   - Grab behind your knee slightly pulling your knee to your chest (you can use a towel if needed)   - Straighten the leg as far as you can. Get a nice easy stretch. Do not hold   - Bring the leg down     Sit with knees apart and massage the inner thigh.  Put pressure and hold for 3 minutes to get the muscles to relax.  2-3 times per day.  Or sit with knees apart 3 minutes before getting up out of a chair.     ABDOMINAL BRACING    While lying on your back, tighten your stomach muscles as you draw your navel down towards the floor. Don't let your back move.  Add in heel slides you have to tighten more the further out your foot goes. Do 5-10 reps each up to 30 on each side as you get better.    BRACE MARCHING not yet    While lying on your back with your knees bent,  slowly raise up one foot a few inches and then set it back down.  Next, perform on your other leg.  Use your stomach muscles to keep your spine from moving.    BRACE - SINGLE KNEE EXTENSION not yet    While lying on your back with knees bent, straighten out one knee while keeping the leg off the ground. Hold as indicated, then return to original position. Next, perform on the other leg.    Use your stomach muscles to keep your spine from moving the entire time.

## 2021-06-18 NOTE — PATIENT INSTRUCTIONS - HE
Patient Instructions by Zeynep Pugh PT at 9/18/2020 11:00 AM     Author: Zeynep Pugh PT Service: -- Author Type: Physical Therapist    Filed: 9/18/2020 12:04 PM Encounter Date: 9/18/2020 Status: Signed    : Zeynep Pugh PT (Physical Therapist)        WARRIOR 1 - YOGA - VIRABHADRASANA 1    Start by taking a step into a lunge position so that your front knee is bent and your back knee is straight. Tighten abdominals drawing naval inward towards spine. Raise your arms over your head. Reaching up    The toes of your front foot should be pointed straight ahead and toes of your back foot should be pointed to the side. Your chest, trunk and head should be pointed forward.     Hold this position.    Slow deep diaphragm breathing the entire time.      SIDELYING TRUNK ROTATION - MODIFIED    While lying on your side with your knees bent,  slowly twist your upper body to the side and rotated your spine.  Do 3 times in each position slowly  3 below 90 3 at 90 and 3 above 90.

## 2021-06-18 NOTE — PROGRESS NOTES
Assessment:     1. Racing heart beat  Electrocardiogram Perform and Read    Holter Monitor    Ambulatory referral to Cardiology   2. Palpitations  Holter Monitor    Ambulatory referral to Cardiology   3. Abnormal EKG  Ambulatory referral to Cardiology   4. Benign Essential Hypertension     5. Other hyperlipidemia          Palpitations  risk factors for this patient include hyperlipidemia and hypertension and further evaluation with Holter monitor and cardiology referral for noted EKG changes  Recent cholesterol panel has been normal.  She is on Lipitor 20 mg daily   Ref Range & Units 4/12/18 11:28 AM     Cholesterol <=199 mg/dL 171   Triglycerides <=149 mg/dL 78   HDL Cholesterol >=50 mg/dL 63   LDL Calculated <=129 mg/dL 92   Patient Fasting > 8hrs?  No         BP Readings from Last 7 Encounters:   05/18/18 118/82   04/12/18 128/84   01/12/18 138/73   01/25/17 118/80   11/14/16 148/86   02/01/16 122/80   08/24/15 128/82     Blood pressure fairly well controlled on 25 mg of hydrochlorothiazide.  Continue current medication      Plan:      Discussed benign causes of palpitation and need for further evaluation.  Follow up in After cardiology workup As needed.       Subjective:      Migdalia Coronado is a 67 y.o. female who presents with chest pain, a fluttering sensation, palpitations and shortness of breath. The symptoms are moderate, occur intermittently, and last 4-5  minutes per episode. They tend to occur while resting . Cardiac risk factors include: advanced age (older than 55 for men, 65 for women), dyslipidemia, family history of premature cardiovascular disease, hypertension, obesity (BMI >= 30 kg/m2) and sedentary lifestyle. Aggravating factors: none. Relieving factors: spontaneous. Associated symptoms: rapid heart beat. Patient denies: abdominal pain, chest pain, cough, fatigue, leg swelling and syncope.    Currently she is asymptomatic.  But she describes usual episodes 4-5 times every day without any  enticing factors.  She denies any chest pain associated with it.  She denies any exertional changes.    The following portions of the patient's history were reviewed and updated as appropriate: allergies, current medications, past family history, past medical history, past social history, past surgical history and problem list.  Allergies   Allergen Reactions     Linzess [Linaclotide]      Stomach pain       Current Outpatient Prescriptions on File Prior to Visit   Medication Sig Dispense Refill     atorvastatin (LIPITOR) 20 MG tablet TAKE 1 1/2 TABLET BY MOUTH EVERY  tablet 3     fluticasone (FLONASE) 50 mcg/actuation nasal spray SHAKE LIQUID AND USE 2 SPRAYS IN EACH NOSTRIL DAILY 48 g 3     hydroCHLOROthiazide (HYDRODIURIL) 50 MG tablet Take 1 tablet (50 mg total) by mouth daily. 90 tablet 1     ibuprofen (ADVIL,MOTRIN) 800 MG tablet TAKE 1 TABLET BY MOUTH THREE TIMES DAILY AS NEEDED 90 tablet 3     potassium chloride SA (K-DUR,KLOR-CON) 20 MEQ tablet Take 1 tablet (20 mEq total) by mouth daily. 90 tablet 3     [DISCONTINUED] cyclobenzaprine (FLEXERIL) 10 MG tablet 1 tab at bedtime as needed for back 30 tablet 3     No current facility-administered medications on file prior to visit.        Patient Active Problem List   Diagnosis     Esophageal Reflux     Hyperactivity Of The Bladder     Postmenopausal Atrophic Vaginitis     Lumbar Disc Degeneration     Disease Of The Nails     Vitamin D Deficiency     Other hyperlipidemia     Benign Essential Hypertension     Constipation     Hypercalcemia     Hypokalemia     Anxiety     Abnormal mammogram, unspecified     Abnormal Pap smear of cervix     URI (upper respiratory infection)     Benign neoplasm of ascending colon     Encounter for screening for malignant neoplasm of colon     Family history of colonic polyps       Past Medical History:   Diagnosis Date     HTN (hypertension)        Past Surgical History:   Procedure Laterality Date     BREAST BIOPSY        "CHOLECYSTECTOMY  2008     NE APPENDECTOMY       NE LIGATE FALLOPIAN TUBE         Family History   Problem Relation Age of Onset     Stomach cancer Mother      Lung cancer Father      Multiple myeloma Brother      Colon polyps Son      Colon cancer Nephew      Stroke Maternal Grandmother        Social History     Social History     Marital status:      Spouse name: N/A     Number of children: N/A     Years of education: N/A     Social History Main Topics     Smoking status: Never Smoker     Smokeless tobacco: Never Used     Alcohol use 0.6 oz/week     1 Glasses of wine per week      Comment: occasionally     Drug use: No     Sexual activity: Not Currently     Partners: Male     Other Topics Concern     None     Social History Narrative    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        Kristina Marquez MD  5/18/2018               Review of Systems  Constitutional: negative  Eyes: negative  Ears, nose, mouth, throat, and face: negative  Respiratory: negative  Gastrointestinal: negative  Genitourinary:negative  Integument/breast: negative  Hematologic/lymphatic: negative  Musculoskeletal:positive for knee pain and follows with orthopedics  Neurological: negative  Behavioral/Psych: positive for mood swings and More tearful these days  Endocrine: negative  Allergic/Immunologic: negative     Objective:   /82 (Patient Site: Left Arm, Patient Position: Sitting, Cuff Size: Adult Large)  Pulse 72  Temp 98.2  F (36.8  C) (Oral)   Resp 16  Ht 5' 6.73\" (1.695 m)  Wt 218 lb (98.9 kg)  BMI 34.42 kg/m2    General Appearance: healthy, alert, oriented and no distress  HEENT Exam: Oropharynx clear without lesion or exudate  Neck:  supple, no adenopathy, thyroid normal  Heart: Normal heart sounds, S1 and S2, No murmurs.  Lungs: Normal breath sounds, Clear to auscultation bilateral  Skin exam: No visible or palpable abnormalities  Neurological exam: gait normal, alert and oriented X " 3  Hem/Lymph/Immuno exam: negative findings:  no cervical nodes, no supraclavicular nodes,    Cardiographics  ECG: normal sinus rhythm, No acute changes and compared with EKG in 2008 I do not see major changes    The cardiology read does show first-degree AV block and an incomplete right bundle-branch block.

## 2021-06-18 NOTE — PATIENT INSTRUCTIONS - HE
Patient Instructions by Zeynep Pugh PT at 9/25/2020  1:00 PM     Author: Zeynep Pugh PT Service: -- Author Type: Physical Therapist    Filed: 9/25/2020  2:08 PM Encounter Date: 9/25/2020 Status: Signed    : Zeynep Pugh PT (Physical Therapist)        SIDELYING TRUNK ROTATION    While lying on your side with your arms out-stretched in front of your body, slowly twist your upper body to the side and rotated your spine. Your arms and head should also be rotating along with the spine as shown. Follow your hand with your eyes.          PELVIC CLOCK    - Imagine clock on pelvis with 12 at your head and 6 at feet and 3 at left hip and 9 at right hip  - Start by pushing your lower back into the mat to find 12 and then tailbone into mat to find 6      - Alternate between 6 and 12, holding for 3-5 seconds in each position.    Then go from 1 to 7 and from 11 to 5 monitor what you are doing by keeping your hands on your hips   Walking keep your shoulders straight let your hips move side to side.   Don't clomp down on your left leg.    Wall circles (hula hoop)  Gently keeping our hips facing forward go clockwise and counterclockwise around in a [place].

## 2021-06-18 NOTE — PATIENT INSTRUCTIONS - HE
Patient Instructions by Zeynep Pugh PT at 10/2/2020 11:00 AM     Author: Zeynep Pugh PT Service: -- Author Type: Physical Therapist    Filed: 10/2/2020 12:12 PM Encounter Date: 10/2/2020 Status: Signed    : Zeynep Pugh PT (Physical Therapist)        PRONE LEG raise       On the edge of the bed slowly lift up your leg behind you.  Either with the knee bent or straight.    Do not allow your spine to move the entire time.      Standing squat down and have the band above the knees and step to the side until you get tired.      Lay on your back pull stomach in first pull toes toward your nose then tighten your thighs, buttocks , squeeze shoulder blades and arms then press head into the bed gently hold 5-10 seconds and then reverse.

## 2021-06-18 NOTE — PATIENT INSTRUCTIONS - HE
Patient Instructions by Zeynep Pugh PT at 8/14/2020  9:00 AM     Author: Zeynep Pugh PT Service: -- Author Type: Physical Therapist    Filed: 8/14/2020  3:42 PM Encounter Date: 8/14/2020 Status: Signed    : Zeynep Pugh PT (Physical Therapist)        GENERAL MOBILIZATION    -Lie on your ___ side.  -Bend knees and hips to 90?.  -Raise knee towards ceiling, keeping knee and ankle parallel.        POSTERIOR ROTATION CORRECTION    -Lie on floor and bring both knees to chest.  -Slowly lower ___ leg to floor.  -With hand, resist bringing your ___ leg to your chest.  -Hold 5 seconds, then relax  -Repeat 4 more times.            MUSCLE ENERGY    -Lie on floor and bring both knees to chest.  -Lower ___ leg to floor.  -With hands around knee of ___ leg, try to push knee away from chest, resisting with hands.  -Hold 5 seconds, then relax.  -Repeat 4 more times.        ANTERIOR/POSTERIOR CORRECTION    -While sitting, place a cane under your ___ thigh and above your ___thigh.  -Push down with the top leg and pull up with the bottom leg simultaneously.          ANTERIOR ROTATION CORRECTION    -Lie on your back with ___ leg bent up and other leg flat.  -Clasp hands behind your knee and attempt to straighten your leg against the resistance of your arms.      ANTERIOR ROTATION CORRECTION    -While standing, pull your knee into your armpit.  -Hold 5-10 seconds.    ANTERIOR ROTATION CORRECTION    -While sitting, pull one knee into your armpit and sit in this position.  -Hold 1-2 minutes.    UPSLIP CORRECTION    -Place your ___ leg on a high, firm surface and sit back on your heel.  -Let your opposite leg dangle.  -Hold 10-30 seconds.        PUBIC TUBERCLE CORRECTION    -Lie on your back with knees bent and feet flat.  -Place ball or towel roll between knees and hands on outside of knees.  -Push out against hands for 10 counts, then release and squeeze inward. Do three bout of 10 seconds it  has to be even and only do when you have a lot of pain..          ANT/POST ROTATION CORRECTION    -Lie on back on edge of bed.  -Pull ___ knee toward your chest and hold.  -Lower your ___ leg off the edge of the bed.  -Hold 5-10 seconds.      BILATERAL MUSCLE ENERGY    -Assume position with ___ thigh on top.  -Press thighs together without allowing any movement.    OUTFLARE CORRECTION    -With knee bent, allow leg to rotate outward keeping hips flat on the floor.  Hold 5-10 seconds.    INFLARE CORRECTION    -Allow involved leg to drop outward.  -Hold 10-30 seconds.        ANT/POST CORRECTION    -Rest right foot flat, as close to buttocks as possible.  -Push right knee toward foot of bed.  -Hold 5-10 seconds, relax.  -Repeat twice more.  -Repeat sequence with left leg.        PELVIC CLOCK  Only do 12 and 6 for now  - Imagine clock on pelvis with 12 at your head and 6 at feet and 3 at left hip and 9 at right hip  - Start by pushing your lower back into the mat to find 12 and then tailbone into mat to find 6  - Alternate between 6 and 12, holding for 3-5 seconds in each position.     Sit withyour legs apart and stretch for 2 minutes       Stand and tighten your stomach and buttock 5-10 seconds 5-10 reps tighten stomach with going up and down stairs

## 2021-06-21 NOTE — PROGRESS NOTES
Assessment and Plan:     1. Routine general medical examination at a health care facility  Pneumococcal conjugate vaccine 13-valent 6wks-17yrs; >50yrs    Influenza High Dose, Seasonal 65+ yrs   2. Benign essential hypertension  Comprehensive Metabolic Panel    HM2(CBC w/o Differential)    hydroCHLOROthiazide (HYDRODIURIL) 50 MG tablet    Ambulatory referral to Ophthalmology   3. Hyperlipidemia  Lipid Cascade FASTING    atorvastatin (LIPITOR) 20 MG tablet   4. Menopause  DXA Bone Density Scan   5. Degeneration of lumbar or lumbosacral intervertebral disc  celecoxib (CELEBREX) 100 MG capsule   6. Atypical nevi  Ambulatory referral to Dermatology     If you are interested in the new shingles shot, Shingrix, please check with insurance for coverage either in clinic or at a pharmacy. It is a 2 shot series 2-6 months apart.     Follow-up in 6 months for med check and 1 year for physical.    Will start Celebrex 100 mg up to 2 times a day as needed. Then do not use Ibuprofen or Tumeric with this. Tylenol is safe to add on if needed.    Can use Melatonin, 1 to 3 mg 30 min before bedtime.    Eye exam yearly, referral entered.    Dermatology due for full body skin check in Feb, referral entered.    Spine clinic consult if wishes.    The patient's current medical problems were reviewed.    I have had an Advance Directives discussion with the patient.  The following health maintenance schedule was reviewed with the patient and provided in printed form in the after visit summary:   Health Maintenance   Topic Date Due     ZOSTER VACCINES (1 of 2) Discussed     DXA SCAN  Ordered     PNEUMOCOCCAL POLYSACCHARIDE VACCINE AGE 65 AND OVER  Next year     PNEUMOCOCCAL CONJUGATE VACCINE FOR ADULTS (PCV13 OR PREVNAR)  Today     FALL RISK ASSESSMENT  Steady     INFLUENZA VACCINE RULE BASED (1) Today     MAMMOGRAM  Due May 2019     COLONOSCOPY  10/07/2021     ADVANCE DIRECTIVES DISCUSSED WITH PATIENT  Declined     TD 18+ HE  08/24/2025      Pap-due in 1-2 years    Subjective:   Chief Complaint: Migdalia Coronado is an 68 y.o. female here for an Annual Wellness visit.   HPI: She mentions she tried some over-the-counter turmeric and it has helped with her knee pain.  She wonders if she could have a prescription for a medication that would help with her back pain and knee pain when it is bothering her.  She does not wish narcotic.  He also wonders if she could take melatonin to help her sleep.  Occasional leakage of urine but not major per the patient.  No other questions.    Review of Systems: Please see above.  The rest of the review of systems are negative for all systems.    Patient Care Team:  Anahi Aguilar MD as PCP - General     Patient Active Problem List   Diagnosis     Esophageal Reflux     Hyperactivity Of The Bladder     Postmenopausal Atrophic Vaginitis     Lumbar Disc Degeneration     Disease Of The Nails     Vitamin D Deficiency     Other hyperlipidemia     Benign Essential Hypertension     Constipation     Hypercalcemia     Hypokalemia     Anxiety     Abnormal mammogram, unspecified     Abnormal Pap smear of cervix     URI (upper respiratory infection)     Benign neoplasm of ascending colon     Encounter for screening for malignant neoplasm of colon     Family history of colonic polyps     Past Medical History:   Diagnosis Date     HTN (hypertension)       Past Surgical History:   Procedure Laterality Date     BREAST BIOPSY       CHOLECYSTECTOMY  2008     AL APPENDECTOMY       AL LIGATE FALLOPIAN TUBE        Family History   Problem Relation Age of Onset     Stomach cancer Mother      Lung cancer Father      Multiple myeloma Brother      Colon polyps Son      Colon cancer Nephew      Stroke Maternal Grandmother       Social History     Socioeconomic History     Marital status:      Spouse name: Not on file     Number of children: Not on file     Years of education: Not on file     Highest education level: Not on file   Social  Needs     Financial resource strain: Not on file     Food insecurity - worry: Not on file     Food insecurity - inability: Not on file     Transportation needs - medical: Not on file     Transportation needs - non-medical: Not on file   Occupational History     Not on file   Tobacco Use     Smoking status: Former Smoker     Types: Cigarettes     Smokeless tobacco: Never Used   Substance and Sexual Activity     Alcohol use: Yes     Alcohol/week: 0.6 oz     Types: 1 Glasses of wine per week     Comment: occasionally     Drug use: No     Sexual activity: Not Currently     Partners: Male   Other Topics Concern     Not on file   Social History Narrative    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        Kristina Marquez MD  5/18/2018          Current Outpatient Medications   Medication Sig Dispense Refill     aspirin 81 MG EC tablet Take 81 mg by mouth daily.       atorvastatin (LIPITOR) 20 MG tablet TAKE 1 1/2 TABLET BY MOUTH EVERY  tablet 3     cholecalciferol, vitamin D3, (VITAMIN D3) 2,000 unit capsule Take 1,000 Units by mouth daily.       fluticasone (FLONASE) 50 mcg/actuation nasal spray SHAKE LIQUID AND USE 2 SPRAYS IN EACH NOSTRIL DAILY (Patient taking differently: SHAKE LIQUID AND USE 2 SPRAYS IN EACH NOSTRIL DAILY AS NEEDED) 48 g 3     hydroCHLOROthiazide (HYDRODIURIL) 50 MG tablet TAKE 1 TABLET(50 MG) BY MOUTH DAILY 90 tablet 1     ibuprofen (ADVIL,MOTRIN) 800 MG tablet TAKE 1 TABLET BY MOUTH THREE TIMES DAILY AS NEEDED 90 tablet 3     metoprolol succinate (TOPROL-XL) 25 MG TAKE 1 TABLET(25 MG) BY MOUTH DAILY 90 tablet 1     potassium chloride SA (K-DUR,KLOR-CON) 20 MEQ tablet Take 1 tablet (20 mEq total) by mouth daily. 90 tablet 3     TURMERIC, BULK, MISC Use As Directed daily.       No current facility-administered medications for this visit.       Objective:   Vital Signs:   Visit Vitals  /85 (Patient Site: Left Arm, Patient Position: Sitting, Cuff Size: Adult Large)   Pulse  "85   Temp 97  F (36.1  C) (Oral)   Resp 14   Ht 5' 7\" (1.702 m)   Wt 215 lb 4 oz (97.6 kg)   BMI 33.71 kg/m         VisionScreening:  No exam data present     PHYSICAL EXAM  /85 (Patient Site: Left Arm, Patient Position: Sitting, Cuff Size: Adult Large)   Pulse 85   Temp 97  F (36.1  C) (Oral)   Resp 14   Ht 5' 7\" (1.702 m)   Wt 215 lb 4 oz (97.6 kg)   BMI 33.71 kg/m      General Appearance:    Alert, cooperative, no distress, appears stated age   Head:    Normocephalic, without obvious abnormality, atraumatic   Eyes:    PERRL, conjunctiva/corneas clear, EOM's intact, fundi     benign, both eyes   Ears:    Normal TM's and external ear canals, both ears   Nose:   Nares normal, septum midline, mucosa normal, no drainage     or sinus tenderness   Throat:   Lips, mucosa, and tongue normal; teeth and gums normal   Neck:   Supple, symmetrical, trachea midline, no adenopathy;     thyroid:  no enlargement/tenderness/nodules; no carotid    bruit or JVD   Back:     Symmetric, no curvature, ROM normal, no CVA tenderness   Lungs:     Clear to auscultation bilaterally, respirations unlabored   Chest Wall:    No tenderness or deformity    Heart:    Regular rate and rhythm, S1 and S2 normal, no murmur, rub    or gallop   Breast Exam:    No tenderness, masses, or nipple abnormality   Abdomen:     Soft, non-tender, bowel sounds active all four quadrants,     no masses, no organomegaly           Extremities:   Extremities normal, atraumatic, no cyanosis or edema   Pulses:   2+ and symmetric all extremities   Skin:   Skin color, texture, turgor normal, no rashes or lesions   Lymph nodes:   Cervical, supraclavicular, and axillary nodes normal   Neurologic:   CNII-XII intact, normal strength, sensation and reflexes     throughout       Assessment Results 11/15/2018   Activities of Daily Living No help needed   Instrumental Activities of Daily Living No help needed   Mini Cog Total Score 5   Some recent data might be hidden "     A Mini-Cog score of 0-2 suggests the possibility of dementia, score of 3-5 suggests no dementia    Identified Health Risks:     Information on urinary incontinence and treatment options given to patient.  Information regarding advance directives (living herrera), including where she can download the appropriate form, was provided to the patient via the AVS.

## 2021-06-26 ENCOUNTER — HEALTH MAINTENANCE LETTER (OUTPATIENT)
Age: 71
End: 2021-06-26

## 2021-06-26 NOTE — PROGRESS NOTES
[facility] Discharge Summary  Patient Name: Migdalia Coronado  Date: 6/4/2021  Referral Diagnosis: M51.37 (ICD-10-CM) - Degeneration of lumbar or lumbosacral intervertebral disc   Referring provider: Anahi Aguilar MD  Visit Diagnosis:   1. Degeneration of lumbar or lumbosacral intervertebral disc     2. Sacro-iliac pain           Goal Status:  Pt. will demonstrate/verbalize independence in self-management of condition in : 6 weeks;12 weeks;Comment;Met  Comment:: To aid in home management of symptoms  Pt. will be independent with home exercise program in : Comment;6 weeks;12 weeks;Met  Comment:: To aid in home management of symptoms  Pt. will have improved quality of sleep: waking less times/night;getting 75-90% of required amount;in 6 weeks;in 12 weeks;Comment;Met  Comment:: patient will decrease her waking to 1-2 times per night due to pain.  Not up due to pain.  Patient will transfer: supine/sit;sit/stand;for in/out of bed;for car;for toileting;for in/out of chair;with less pain;with less difficulty;in 6 weeks;in 12 weeks;Comment;Met  Comment: decrease pain and difficulty of getting in and out of bed by 50%, met no longer having pain getting in and out of bed    Exercises:  Exercise #1: standing tighten stomach and buttocks 5-10 seconds 5-10 reps 1-2 times per day  Exercise #2: brief verbal review of transverse abdominus exercise with heel slides  Comment #2: supine nerve slider patient  Exercise #3: patient to hold off of bridges as they were causing her to have increased pain  Comment #3: pelvic tilts as needed throughout the day to help to loosen up the spine, to do 12-6 11-5 and 1-7 monitoring the pelvis with her hands needed verbal and tactile cues today with this  Exercise #4: Lay on your back flat focus on relaxing your stomach (psoas)  You may put a pillow under your hips if you don't feel a stretch when you are flat.  5 minutes as needed.  Exercise #5: demonstrated properly with verbal  cues supine transverse abdominus with heel slides  Comment #5: The patient was instructed in slump slider and straight leg slider that she can choose from gently 10 reps  Exercise #6: reach and roll not modified  Comment #6: gently back and forth 3 below 90 3 at 90 and 3 above 90  Exercise #7: anterior fascial line stretching hip flexor stretch standing and then raise arms up and hold, patient to do this in a corner for her balance  Exercise #8: Wall circles (hula hoop)  Comment #8: walking cues to keep her shoulders straight and avoid stomping down with her left foot  Exercise #9: Instructed the patient in reach and roll not modified,  Patient was seen for 6 visits from 8/14/20 to 10/2/20  The patient met goals and has demonstrated understanding of and independence in the home program for self-care, and progression to next steps.  She will initiate contact if questions or concerns arise.    Therapy will be discontinued at this time.  The patient will need a new referral to resume.    Thank you for your referral.  Zeynep Pugh PT  6/4/2021  12:11 PM

## 2021-06-26 NOTE — PROGRESS NOTES
"Progress Notes by Isabelle Arrington MD at 5/31/2018 10:30 AM     Author: Isabelle Arrington MD Service: -- Author Type: Physician    Filed: 5/31/2018 11:25 AM Encounter Date: 5/31/2018 Status: Signed    : Isabelle Arrington MD (Physician)           Click to link to [facility] HEART CARE NOTE    Thank you, Dr. Marquez, for asking us to see Migdalia Coronado at the [facility].      Assessment/Recommendations   Assessment:    This is a 67-year-old woman with history of dyslipidemia, obesity and hypertension who now has symptoms of palpitations that are likely secondary to symptomatic premature ventricular contractions.  Likely benign in nature.  She notices them mainly when she is resting and watching TV at night.  No exertional symptoms.    Plan:  1   Echocardiogram to evaluate for structural heart disease.  2.  Symptomatic and will start low-dose beta-blocker to see if this helps with her symptoms.  3.  Hypertension is well controlled.  Plan on follow-up in 1 month.       History of Present Illness    Ms. Migdalia Coronado is a 67 y.o. female with history of dyslipidemia, obesity and hypertension who I am seeing today for initial consultation for palpitations.  She has noted that she has \"just not felt right\" over the past few months.  At times she feels her heart fluttering when she watches TV and she can feel winded with this.  She feels her heart beating irregularly.  No complaints of chest pain or shortness of breath with exertion.  No increased edema orthopnea either.  She underwent a Holter monitor that showed a moderate amount of PVCs a low complexity and rare supraventricular ectopy.    Holter monitor 5/21/2018  HOLTER MONITOR     IMPRESSION:  1.  A 24-hour Holter monitor was applied 05/21/2018 with date of interpretation  05/22/2018.  2.  Sinus rhythm is present.  Sometimes the MO interval is a prolonged , the QRS was  normal.  3.  Average heart " rate of 73 beats per minute, ranges between 55 to 126 beats per  minute.  4.  No paroxysmal bradycardia, tachycardia or heart block or pauses.  5.  Moderately frequent ventricular ectopy with 3528 PVCs which is 3.4% of total  heartbeats.  6.  PVCs are all of a single morphology and the PVCs occur singularly with no  repetitive forms, no salvos.  The PVCs appear to originate in the upper region of  the ventricle, perhaps left ventricular outflow tract.  7.  Supraventricular ectopy is rare with 18 PACs.  There is no atrial tachycardia or  atrial fibrillation.  8.  Patient activity diary notes palpitations on 2 occasions here.  During  palpitations singular PVCs are present.     DISCUSSION:  Moderately frequent singular unifocal PVCs originating from a single  site, probably in the left ventricular outflow tract. PVC  complexity is low;   and sometimes the PVCs  are perceived as palpitations.        SURENDRA MOORE 05/22/2018 16:06:10  T 05/22/2018 16:38:25  R 05/22/2018 16:38:25  [id number]       Physical Examination Review of Systems   Vitals:    05/31/18 1027   BP: 112/74   Pulse: 88   Resp: 20     Body mass index is 34.14 kg/(m^2).  Wt Readings from Last 3 Encounters:   05/31/18 218 lb (98.9 kg)   05/18/18 218 lb (98.9 kg)   04/12/18 220 lb (99.8 kg)       General Appearance:   alert, no apparent distress   HEENT:  no scleral icterus; the mucous membranes are pink and moist                                  Neck: jugular venous pressure normal, no thyromegaly   Chest: the spine is straight and the chest is symmetric   Lungs:   respirations unlabored; the lungs are clear to auscultation   Cardiovascular:   regular rhythm with normal first and second heart sounds and no murmurs or gallops   Abdomen:  no organomegaly, masses, bruits, or tenderness; bowel sounds are present   Extremities: no cyanosis, clubbing, or edema   Skin: no xanthelasma    General: Weight Gain  Eyes: WNL  Ears/Nose/Throat: WNL  Lungs:  Cough, Shortness of Breath  Heart: Shortness of Breath with activity  Stomach: Constipation  Bladder: Frequent Urination at Night  Muscle/Joints: Joint Pain  Skin: WNL  Nervous System: Loss of Balance  Mental Health: Anxiety     Blood: WNL     Medical History  Surgical History Family History Social History   Past Medical History:   Diagnosis Date   ? HTN (hypertension)     Past Surgical History:   Procedure Laterality Date   ? BREAST BIOPSY     ? CHOLECYSTECTOMY  2008   ? DE APPENDECTOMY     ? DE LIGATE FALLOPIAN TUBE      Family History   Problem Relation Age of Onset   ? Stomach cancer Mother    ? Lung cancer Father    ? Multiple myeloma Brother    ? Colon polyps Son    ? Colon cancer Nephew    ? Stroke Maternal Grandmother     Social History     Social History   ? Marital status:      Spouse name: N/A   ? Number of children: N/A   ? Years of education: N/A     Occupational History   ? Not on file.     Social History Main Topics   ? Smoking status: Former Smoker     Types: Cigarettes   ? Smokeless tobacco: Never Used   ? Alcohol use 0.6 oz/week     1 Glasses of wine per week      Comment: occasionally   ? Drug use: No   ? Sexual activity: Not Currently     Partners: Male     Other Topics Concern   ? Not on file     Social History Narrative    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        Kristina Marquez MD  5/18/2018                  Medications  Allergies   Current Outpatient Prescriptions   Medication Sig Dispense Refill   ? aspirin 81 MG EC tablet Take 81 mg by mouth daily.     ? atorvastatin (LIPITOR) 20 MG tablet TAKE 1 1/2 TABLET BY MOUTH EVERY  tablet 3   ? cholecalciferol, vitamin D3, (VITAMIN D3) 2,000 unit capsule Take 1,000 Units by mouth daily.     ? fluticasone (FLONASE) 50 mcg/actuation nasal spray SHAKE LIQUID AND USE 2 SPRAYS IN EACH NOSTRIL DAILY (Patient taking differently: SHAKE LIQUID AND USE 2 SPRAYS IN EACH NOSTRIL DAILY AS NEEDED) 48 g 3   ?  hydroCHLOROthiazide (HYDRODIURIL) 50 MG tablet Take 1 tablet (50 mg total) by mouth daily. 90 tablet 1   ? ibuprofen (ADVIL,MOTRIN) 800 MG tablet TAKE 1 TABLET BY MOUTH THREE TIMES DAILY AS NEEDED 90 tablet 3   ? potassium chloride SA (K-DUR,KLOR-CON) 20 MEQ tablet Take 1 tablet (20 mEq total) by mouth daily. 90 tablet 3   ? TURMERIC, BULK, MISC Use As Directed daily.     ? metoprolol succinate (TOPROL XL) 25 MG Take 1 tablet (25 mg total) by mouth daily. 30 tablet 5     No current facility-administered medications for this visit.       Allergies   Allergen Reactions   ? Linzess [Linaclotide]      Stomach pain         Lab Results    Chemistry/lipid CBC Cardiac Enzymes/BNP/TSH/INR   Lab Results   Component Value Date    CHOL 171 04/12/2018    HDL 63 04/12/2018    LDLCALC 92 04/12/2018    TRIG 78 04/12/2018    CREATININE 0.75 04/12/2018    BUN 14 04/12/2018    K 3.9 04/12/2018     04/12/2018     04/12/2018    CO2 26 04/12/2018    Lab Results   Component Value Date    WBC 5.4 04/12/2018    HGB 14.2 04/12/2018    HCT 41.8 04/12/2018    MCV 88 04/12/2018     04/12/2018    Lab Results   Component Value Date    TSH 3.61 02/23/2015

## 2021-06-26 NOTE — PROGRESS NOTES
Progress Notes by Isabelle Arrington MD at 7/2/2018  8:30 AM     Author: Isabelle Arrington MD Service: -- Author Type: Physician    Filed: 7/2/2018  9:19 AM Encounter Date: 7/2/2018 Status: Signed    : Isabelle Arrington MD (Physician)           Click to link to [facility]     [facility] HEART [facility] NOTE    Thank you, Dr. Aguilar, for asking us to see Migdalia Coronado at the [facility].      Assessment/Recommendations   Assessment:    This is a 67-year-old woman with history of dyslipidemia, obesity and hypertension with symptoms of palpitations secondary to symptomatic premature ventricular contractions likely benign in nature.     Plan:  1    Echocardiogram did not demonstrate any significant structural heart disease.  2.    May take the low-dose metoprolol on an as-needed basis.  3.  Hypertension is well controlled.  Follow-up in a year.       History of Present Illness    Ms. Migdalia Coronado is a 67 y.o. female with history of dyslipidemia, obesity and hypertension who I am seeing today for follow-up.  She has felt palpitations over the past few months and was found to have moderate amount of premature ventricular contractions of low complexity on her Holter monitor.  I started her on a low dose of  metoprolol.  At first she felt very well with improvement in her symptoms however then she noticed problems with feeling woozy at times and diarrhea in the morning.  She stopped taking her Toprol and this seemed to help.  She has now been taking it occasionally with worsening symptoms of palpitations.  She has problems with her knees he does not exercise much.  She does not notice any problems with chest discomfort.  Occasionally when she talks while exercising she notices some shortness of breath with exertion.  Echocardiogram 6/14/2018 echocardiogram 6/14/2018    Normal echocardiogram.    The calculated left ventricular ejection fraction is 62%.    Normal right  ventricular size and systolic function.    No hemodynamically significant valvular heart abnormalities.    No previous study for comparison.    Normal left ventricular size.     Holter monitor 5/21/2018  HOLTER MONITOR      IMPRESSION:  1.  A 24-hour Holter monitor was applied 05/21/2018 with date of interpretation  05/22/2018.  2.  Sinus rhythm is present.  Sometimes the OR interval is a prolonged , the QRS was  normal.  3.  Average heart rate of 73 beats per minute, ranges between 55 to 126 beats per  minute.  4.  No paroxysmal bradycardia, tachycardia or heart block or pauses.  5.  Moderately frequent ventricular ectopy with 3528 PVCs which is 3.4% of total  heartbeats.  6.  PVCs are all of a single morphology and the PVCs occur singularly with no  repetitive forms, no salvos.  The PVCs appear to originate in the upper region of  the ventricle, perhaps left ventricular outflow tract.  7.  Supraventricular ectopy is rare with 18 PACs.  There is no atrial tachycardia or  atrial fibrillation.  8.  Patient activity diary notes palpitations on 2 occasions here.  During  palpitations singular PVCs are present.      DISCUSSION:  Moderately frequent singular unifocal PVCs originating from a single  site, probably in the left ventricular outflow tract. PVC  complexity is low;   and sometimes the PVCs  are perceived as palpitations.          SURENDRA MOORE 05/22/2018 16:06:10  T 05/22/2018 16:38:25  R 05/22/2018 16:38:25  [id number]       Physical Examination Review of Systems   Vitals:    07/02/18 0832   BP: 100/70   Pulse: 68   Resp: 16     Body mass index is 33.33 kg/(m^2).  Wt Readings from Last 3 Encounters:   07/02/18 216 lb (98 kg)   06/14/18 218 lb (98.9 kg)   05/31/18 218 lb (98.9 kg)       General Appearance:   alert, no apparent distress   HEENT:  no scleral icterus; the mucous membranes are pink and moist                                  Neck: jugular venous pressure normal   Chest: the spine is  straight and the chest is symmetric   Lungs:   respirations unlabored; the lungs are clear to auscultation   Cardiovascular:   regular rhythm with normal first and second heart sounds and no murmurs or gallops   Abdomen:  no organomegaly, masses, bruits, or tenderness; bowel sounds are present   Extremities: no cyanosis, clubbing, or edema   Skin: no xanthelasma    General: WNL  Eyes: WNL  Ears/Nose/Throat: WNL  Lungs: WNL  Heart: Irregular Heartbeat  Stomach: WNL  Bladder: WNL  Muscle/Joints: Joint Pain  Skin: WNL  Nervous System: WNL  Mental Health: WNL     Blood: WNL     Medical History  Surgical History Family History Social History   Past Medical History:   Diagnosis Date   ? HTN (hypertension)     Past Surgical History:   Procedure Laterality Date   ? BREAST BIOPSY     ? CHOLECYSTECTOMY  2008   ? OK APPENDECTOMY     ? OK LIGATE FALLOPIAN TUBE      Family History   Problem Relation Age of Onset   ? Stomach cancer Mother    ? Lung cancer Father    ? Multiple myeloma Brother    ? Colon polyps Son    ? Colon cancer Nephew    ? Stroke Maternal Grandmother     Social History     Social History   ? Marital status:      Spouse name: N/A   ? Number of children: N/A   ? Years of education: N/A     Occupational History   ? Not on file.     Social History Main Topics   ? Smoking status: Former Smoker     Types: Cigarettes   ? Smokeless tobacco: Never Used   ? Alcohol use 0.6 oz/week     1 Glasses of wine per week      Comment: occasionally   ? Drug use: No   ? Sexual activity: Not Currently     Partners: Male     Other Topics Concern   ? Not on file     Social History Narrative    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        Kristina Marquez MD  5/18/2018                  Medications  Allergies   Current Outpatient Prescriptions   Medication Sig Dispense Refill   ? aspirin 81 MG EC tablet Take 81 mg by mouth daily.     ? atorvastatin (LIPITOR) 20 MG tablet TAKE 1 1/2 TABLET BY MOUTH EVERY DAY  135 tablet 3   ? cholecalciferol, vitamin D3, (VITAMIN D3) 2,000 unit capsule Take 1,000 Units by mouth daily.     ? fluticasone (FLONASE) 50 mcg/actuation nasal spray SHAKE LIQUID AND USE 2 SPRAYS IN EACH NOSTRIL DAILY (Patient taking differently: SHAKE LIQUID AND USE 2 SPRAYS IN EACH NOSTRIL DAILY AS NEEDED) 48 g 3   ? hydroCHLOROthiazide (HYDRODIURIL) 50 MG tablet Take 1 tablet (50 mg total) by mouth daily. 90 tablet 1   ? ibuprofen (ADVIL,MOTRIN) 800 MG tablet TAKE 1 TABLET BY MOUTH THREE TIMES DAILY AS NEEDED 90 tablet 3   ? metoprolol succinate (TOPROL-XL) 25 MG TAKE 1 TABLET(25 MG) BY MOUTH DAILY 90 tablet 1   ? potassium chloride SA (K-DUR,KLOR-CON) 20 MEQ tablet Take 1 tablet (20 mEq total) by mouth daily. 90 tablet 3   ? TURMERIC, BULK, MISC Use As Directed daily.       No current facility-administered medications for this visit.       Allergies   Allergen Reactions   ? Linzess [Linaclotide]      Stomach pain         Lab Results    Chemistry/lipid CBC Cardiac Enzymes/BNP/TSH/INR   Lab Results   Component Value Date    CHOL 171 04/12/2018    HDL 63 04/12/2018    LDLCALC 92 04/12/2018    TRIG 78 04/12/2018    CREATININE 0.75 04/12/2018    BUN 14 04/12/2018    K 3.9 04/12/2018     04/12/2018     04/12/2018    CO2 26 04/12/2018    Lab Results   Component Value Date    WBC 5.4 04/12/2018    HGB 14.2 04/12/2018    HCT 41.8 04/12/2018    MCV 88 04/12/2018     04/12/2018    Lab Results   Component Value Date    TSH 3.61 02/23/2015

## 2021-06-29 NOTE — PROGRESS NOTES
Progress Notes by Yue Gonzales OT at 9/28/2020 11:00 AM     Author: Yue Gonzales OT Service: -- Author Type: Occupational Therapist    Filed: 9/28/2020 11:37 AM Encounter Date: 9/28/2020 Status: Attested    : Yue Gonzales OT (Occupational Therapist) Cosigner: Anahi Aguilar MD at 9/28/2020  1:00 PM    Attestation signed by Anhai Aguilar MD at 9/28/2020  1:00 PM    .                    Rehabilitation Certification Request    September 28, 2020      Patient: Migdalia Coronado  MR Number: [id number]  YOB: 1950  Date of Visit: 9/28/2020      Dear Dr. Anahi Aguilar:    Thank you for this referral.   We are seeing Migdalia Coronado in Occupational Therapy for vertigo.    Medicare and/or Medicaid requires physician review and approval of the treatment plan. Please review the plan of care and verify that you agree with the therapy plan of care by co-signing this note.      Plan of Care  Authorization / Certification Start Date: 09/28/20  Authorization / Certification End Date: 12/27/20  Authorization / Certification Number of Visits: 12  Communication with: Referral Source  Patient Related Instruction: Nature of Condition;Treatment plan and rationale;Basis of treatment;Expected outcome;Precautions  Times per Week: 1  Number of Weeks: 12  Number of Visits: 12  Select Plan of Care: Select  Neuromuscular Reeducation: vestibular  Functional Training (ADL's): compensatory training  Canolith Repostioning: .      Goals:  Patient will bend: to dress;without vertigo;in 12 weeks  Patient able to perform bed mobility: without vertigo;in 12 weeks  Patient will turn head: without vertigo;for conversation;in 12 weeks  Patient will look up / down: without vertigo;for drinking;in 12 weeks        If you have any questions or concerns, please don't hesitate to call.    Sincerely,      Yue Gonzales OT        Physician recommendation:                                ___ Follow  therapist's recommendation                                                                                                    ___ Modify therapy                                                                                                      Physician Signature:_____________________                                                                                                                                        Date:___________________________    *Physician co-signature indicates they certify the need for these services furnished within this plan and while under their care.         Vestibular Initial Evaluation    Patient Name: Migdalia Coronado  Date of evaluation: 9/28/2020  Referral Diagnosis: BPPV  Referring provider: Anahi Aguilar MD  Visit Diagnosis:     ICD-10-CM    1. Benign paroxysmal positional vertigo, right  H81.11    2. Unsteadiness  R26.81    3. Decreased activities of daily living (ADL)  Z78.9        Assessment:      Patient has positive right Dutton - Hallpike and was treated with right Epley maneuver for posterior canal BPPV.     Goals:  Patient will bend: to dress;without vertigo;in 12 weeks  Patient able to perform bed mobility: without vertigo;in 12 weeks  Patient will turn head: without vertigo;for conversation;in 12 weeks  Patient will look up / down: without vertigo;for drinking;in 12 weeks      Patient's expectations/goals are realistic.    Barriers to Learning or Achieving Goals:  No Barriers.       Plan / Patient Instructions:        Plan of Care:   Authorization / Certification Start Date: 09/28/20  Authorization / Certification End Date: 12/27/20  Authorization / Certification Number of Visits: 12  Communication with: Referral Source  Patient Related Instruction: Nature of Condition;Treatment plan and rationale;Basis of treatment;Expected outcome;Precautions  Times per Week: 1  Number of Weeks: 12  Number of Visits: 12  Select Plan of Care: Select  Neuromuscular Reeducation:  vestibular  Functional Training (ADL's): compensatory training  Canolith Repostioning: .         Subjective:         History of Present Illness:    Migdalia is a 70 y.o. female who presents to therapy today with complaints of vertigo and unsteadiness. Patient had sudden onset of symptoms 2019. Symptoms are not improving. She denies history of similar symptoms. Patient denies ear pain. Patient denies hearing changes. Functional limitations are described as occurring with balance, bending, bed mobility, head turns, looking up or down, stepping over curbs.    Pain Ratin         Objective:      Note: Items left blank indicates the item was not performed or not indicated at the time of the evaluation.    Patient Outcome Measures:   Dizziness Handicap Inventory  Score in %: 22       Vestibular Disorder Examination  1. Benign paroxysmal positional vertigo, right     2. Unsteadiness     3. Decreased activities of daily living (ADL)         Precautions/Restrictions: None    Posture Observation: General standing posture is normal.    ROM:  Not Tested    Strength: Not Tested    Sensation: Patient reports normal sensation    Functional Mobility: good      Modified CTSIB: NT  Normal Surface Eyes Open-  Normal Surface Eyes Closed-  Perturbed Surface Eyes Open-  Perturbed Surface Eyes Closed-    The remainder of the tests are performed in room light.    Oculomotor Assessment: NT  Spontaneous Nystagmus with fixation:   Spontaneous Nystagmus without fixation:  Gaze-evoked nystagmus:  Smooth pursuit:  Saccades:  VOR to slow movement:   VOR Head Thrust:  Static Visual Acuity:  Dynamic Visual Acuity:    Positional Tests:  Hallpike Right:  Abnormal - Nystagmus right torsional, up beating, 3 second latency and fatigues in 20 seconds  Hallpike Left:  Negative  Head Roll Right: NT  Head Roll Left:  NT    Plan for next visit: repeat positional tests-start with left ear down    Treatment Today:  Patient treated with right Epley  maneuver X2. Signs and symptoms appear negative on second maneuver.  TREATMENT MINUTES COMMENTS   Evaluation 15    Self-care/ Home management     Neuromuscular Re-education     Canalith repositioning procedure 10          Total 25    Blank areas are intentional and mean the treatment did not include these items.     GOALS AND PLAN OF CARE WERE ESTABLISHED IN COOPERATION WITH THE PATIENT    OT Evaluation Code: (Please list factors)   Comorbidities:   Patient Active Problem List   Diagnosis   ? Esophageal Reflux   ? Hyperactivity Of The Bladder   ? Postmenopausal Atrophic Vaginitis   ? Lumbar Disc Degeneration   ? Disease Of The Nails   ? Vitamin D Deficiency   ? Other hyperlipidemia   ? Benign Essential Hypertension   ? Constipation   ? Hypercalcemia   ? Hypokalemia   ? Anxiety   ? Abnormal mammogram, unspecified   ? Abnormal Pap smear of cervix   ? Benign neoplasm of ascending colon   ? Encounter for screening for malignant neoplasm of colon   ? Family history of colonic polyps       Profile/History Review: Brief    Need for eval modification: No    # Treatment options: Limited    Clinical Decision Making:  Low      Occupational Profile/ Medical and Therapy History and Comorbidities Occupational Performance Clinical Decision Making   (Complexity)   brief history with review of medical/therapy records related to the presenting problem.  No comorbidities 1-3 Performance deficits that result in activity limitations and/or participation restrictions.    No Assessment Modification  Low complexity, which includes  problem-focused assessments, and consideration of a limited number of treatment options.      expanded review of medical/therapy records and additional review of physical, cognitive and psychosocial history.    May have comorbidities 3-5 Performance deficits that result in activity limitations and/or participation restrictions.    Minimal to moderate modification of assessment Moderate complexity, which includes  analysis of data from detailed assessments, and consideration of several treatment options.         Review of medical/therapy records and extensive additional review of physical, cognitive and psychosocial history.  Comorbidities affect occupational performance 5 or more Performance deficits that result in activity limitations and/or participation restrictions.    Significant modification of assessment High complexity, analysis of  Occupational profile and data,  Comprehensive assessments, multiple treatment options.            Yue Gonzales  9/28/2020  11:07 AM

## 2021-06-29 NOTE — PROGRESS NOTES
Progress Notes by Zeynep Pugh PT at 8/14/2020  9:00 AM     Author: Zeynep Pugh PT Service: -- Author Type: Physical Therapist    Filed: 8/14/2020  3:42 PM Encounter Date: 8/14/2020 Status: Attested    : Zeynep Pugh PT (Physical Therapist) Cosigner: Anahi Aguilar MD at 8/17/2020 12:44 PM    Attestation signed by Anahi Aguilar MD at 8/17/2020 12:44 PM    .                      [facility] Certification Request    August 14, 2020      Patient: Migdalia Coronado  MR Number: [id number]  YOB: 1950  Date of Visit: 8/14/2020      Dear Anahi Watkins MD:    Thank you for this referral.   We are seeing Migdalia Coronado for Physical Therapy of Degeneration of lumbar or lumbosacral intervertebral disc.    Medicare and/or Medicaid requires physician review and approval of the treatment plan. Please review the plan of care and verify that you agree with the therapy plan of care by co-signing this note.      Plan of Care  Authorization / Certification Start Date: 08/14/20  Authorization / Certification End Date: 11/17/20  Authorization / Certification Number of Visits: 12  Communication with: Referral Source  Patient Related Instruction: Nature of Condition;Treatment plan and rationale;Self Care instruction;Basis of treatment;Body mechanics;Posture;Precautions;Next steps;Expected outcome  Times per Week: 1  Number of Weeks: 12  Number of Visits: 12  Discharge Planning: when goals are met or plateau of progress  Precautions / Restrictions : none  Therapeutic Exercise: ROM;Stretching;Strengthening  Neuromuscular Reeducation: kinesio tape;posture;balance/proprioception;TNE;core  Manual Therapy: soft tissue mobilization;myofascial release;joint mobilization;muscle energy;strain counterstrain;other  Manual Therapy: neural mobilization  Modalities: traction;electrical stimulation;TENS;ultrasound;cold pack;hot pack  Functional Training (ADL's):  ADL's;ergonomics  Gait Training: if needed  Equipment: theraband      Goals:  Pt. will demonstrate/verbalize independence in self-management of condition in : 6 weeks;12 weeks;Comment  Comment:: To aid in home management of symptoms  Pt. will be independent with home exercise program in : Comment;6 weeks;12 weeks  Comment:: To aid in home management of symptoms  Pt. will have improved quality of sleep: waking less times/night;getting 75-90% of required amount;in 6 weeks;in 12 weeks;Comment  Comment:: patient will decrease her waking to 1-2 times per night due to pain.  Patient will transfer: supine/sit;sit/stand;for in/out of bed;for car;for toileting;for in/out of chair;with less pain;with less difficulty;in 6 weeks;in 12 weeks;Comment  Comment: decrease pain and difficulty of getting in and out of bed by 50%          If you have any questions or concerns, please don't hesitate to call.    Sincerely,      Zeynep Pugh, PT        Physician recommendation:     ___ Follow therapist's recommendation        ___ Modify therapy      *Physician co-signature indicates they certify the need for these services furnished within this plan and while under their care.  [facility]   Lumbo-Pelvic Initial Evaluation    Patient Name: Migdalia Coronado  Date of evaluation: 8/14/2020  Referral Diagnosis: Degeneration of lumbar or lumbosacral intervertebral disc  Referring provider: Anahi Aguilar MD  Visit Diagnosis:     ICD-10-CM    1. Degeneration of lumbar or lumbosacral intervertebral disc  M51.37    2. Sacro-iliac pain  M53.3        Assessment:    At the initial evaluation the patient presented with a pubic tubercle dysfunction and a right on left backward sacral dysfunction.  The patient also had a FRS right L4 today.  The patient had tightness in the lower abdomen right more than the left as well as tightness on the right adductors up high in the groin.  She has a limitation in her spine with  both flexion and extension with extension causing pain.  The patient has poor posture in sitting and standing.  When sitting she has a tendency to cross her legs.  Pt. is appropriate for skilled PT intervention as outlined in the Plan of Care (POC).  Pt. is a good candidate for skilled PT services to improve pain levels and function.  Skilled Physical Therapy needed to increase ROM, increase strength, decrease pain and improve functional status.     Goals:  Pt. will demonstrate/verbalize independence in self-management of condition in : 6 weeks;12 weeks;Comment  Comment:: To aid in home management of symptoms  Pt. will be independent with home exercise program in : Comment;6 weeks;12 weeks  Comment:: To aid in home management of symptoms  Pt. will have improved quality of sleep: waking less times/night;getting 75-90% of required amount;in 6 weeks;in 12 weeks;Comment  Comment:: patient will decrease her waking to 1-2 times per night due to pain.  Patient will transfer: supine/sit;sit/stand;for in/out of bed;for car;for toileting;for in/out of chair;with less pain;with less difficulty;in 6 weeks;in 12 weeks;Comment  Comment: decrease pain and difficulty of getting in and out of bed by 50%    The patient was involved in setting the goals.    Patient's expectations/goals are realistic.    Barriers to Learning or Achieving Goals:  No Barriers.       Plan / Patient Instructions:        Plan of Care:   Authorization / Certification Start Date: 08/14/20  Authorization / Certification End Date: 11/17/20  Authorization / Certification Number of Visits: 12  Communication with: Referral Source  Patient Related Instruction: Nature of Condition;Treatment plan and rationale;Self Care instruction;Basis of treatment;Body mechanics;Posture;Precautions;Next steps;Expected outcome  Times per Week: 1  Number of Weeks: 12  Number of Visits: 12  Discharge Planning: when goals are met or plateau of progress  Precautions / Restrictions :  none  Therapeutic Exercise: ROM;Stretching;Strengthening  Neuromuscular Reeducation: kinesio tape;posture;balance/proprioception;TNE;core  Manual Therapy: soft tissue mobilization;myofascial release;joint mobilization;muscle energy;strain counterstrain;other  Manual Therapy: neural mobilization  Modalities: traction;electrical stimulation;TENS;ultrasound;cold pack;hot pack  Functional Training (ADL's): ADL's;ergonomics  Gait Training: if needed  Equipment: theraband      Plan for next visit: Please reassess the patient's spine and SI as well as lower abdomen and hip adductors.  Give patient instructions for sciatic nerve gliding as we talked about it last time but did not give instructions.  Go over posture and body mechanics especially avoiding the slightly bent over position.  Have patient demonstrate the transverse abdominus with heel slides and make sure that she is doing this exercise that she was given in the past correctly.  Assess dural mobility.     Subjective:           History of Present Illness:    Migdalia is a 69 y.o. female who presents to therapy today with complaints of pain since 2007.  Initially she had a hard time with walking a long distance and it started on the left anterior hip region.  Now her pain is mostly on her right more than the left PSIS region and SI.  Went to [facility] and did steriod injections in the low back last one about 5 years ago [place].  January went on a cruise and then COVID.  Pain is worsein the AM lower right side right PSIS and is tender to the touch left hurts some but not too much.  9/10 then she will ice it and then the heating pad and then will go on with her day.  With standing too long 1-2 hours . Occasionally she will have shoting pain down to her knee after sitting a long time.  Date of onset/duration of symptoms is approximately 1/2007. Onset was gradual. Symptoms are constant and not improving. She reports  a constant  history of  similar symptoms. She reports that she is doing bridges, supine marches hamstring stretch sit to stand. Not much change with these exercises.  The patient reports that she does get increased pain with bridges.    Pain Ratin  Pain rating at best: 3  Pain rating at worst: 9  Pain description: sharp    Functional limitations are described as occurring with:   lifting  sitting 15-20 minutes  standing 1 hour  transitional movements sit to stand  Worse with sitting longer  Patient reports benefit from:  rest  , heat, cold, physical therapy         Objective:      Note: Items left blank indicates the item was not performed or not indicated at the time of the evaluation.    Patient Outcome Measures :    No data recorded     Scores range from 0-100%, where a score of 0% represents minimal pain and maximal function. The minimal clinically important difference is a score reduction of 12%.    Examination  1. Degeneration of lumbar or lumbosacral intervertebral disc     2. Sacro-iliac pain       Involved side: right worse than the left  Posture Observation:      General sitting posture is  poor and she has a tendency to cross her legs with sitting..  General standing posture is fair.    S2 right all the way down and sacral base Left stuck   Right on left backwards sacral dyafunction  General listening to anterior lower abdomen  In standing L3 up to T10 right rotated    Lumbar ROM:    Date:      *Indicate scale AROM AROM AROM   Lumbar Flexion 1/4 the way down the shin stretching     Lumbar Extension Moderate movemnt loss pain on the right 5/10 pain      Right Left Right Left Right Left   Lumbar Sidebending         Lumbar Rotation         Thoracic Flexion      Thoracic Extension      Thoracic Sidebending         Thoracic Rotation           Lower Extremity Strength:     Date:      LE strength/5 Right Left Right Left Right Left   Hip Flexion (L1-3) 5/5 5/5       Hip Extension (L5-S1)         Hip Abduction (L4-5)         Hip  Adduction (L2-3)         Hip External Rotation         Hip Internal Rotation         Knee Extension (L3-4) 5/5 5/5       Knee Flexion 5/5 5/5       Ankle Dorsiflexion (L4-5) 5/5 5/5       Great Toe Extension (L5) 5/5 5/5       Ankle Plantar flexion (S1)         Abdominals        Sensation           Reflex Testing  Lumbar Dermatomes Right Left UE Reflexes Right Left   Iliac Crest and Groin (L1)   Biceps (C5-6)     Anterior Medial Thigh (L2)   Brachioradialis (C5-6)     Anterior Thigh, Medial Epicondyle Femur (L3)   Triceps (C7-8)     Lateral Thigh, Anterior Knee, Medial Leg/Malleolus (L4)   Hoffmanns test     Lateral Leg, Dorsal Foot (L5)   LE Reflexes     Lateral Foot (S1)   Patellar (L3-4)     Posterior Leg (S2)   Achilles (S1-2)     Other:   Babinski Response       Palpation:Tight lower abdominal centrally slightly right of center more than slightly left of center.  Tight adductors on the right   FRS right L4,5      Lumbar Special Tests:     Lumbar Special Tests Right Left SI Tests Right  Left   Quadrant test   SI Compression     Straight leg raise 70 tight behind the knee 70 tight behind the knee SI Distraction     Crossover response   POSH Test     Slump   Sacral Thrust     Sit-up test  FADIR     Trunk extensor endurance test  Resisted Abduction     Prone instability test  Other:     Pubic shotgun  Other:           LE Screen:  no change in pain ROM WFL bilaterally    Treatment Today     TREATMENT MINUTES COMMENTS   Evaluation 30    Self-care/ Home management     Manual therapy 20 MFR to  lower abdominal centrally slightly right of center more than slightly left of center.  MFR trigger point release to the adductors on the right   MRT to FRS right L4, about 20 bouts   Neuromuscular Re-education     Therapeutic Activity     Therapeutic Exercises 10 Standing abdominal and gluteal isometrics, pelvic tilts verbal review and demonstration of shot gun technique for pubic tubercle dysfunction.  Discussed avoiding  crossing legs and avoiding standing on one leg discussed how to keep weight evenly on both feet.     Gait training     Modality__________________                Total 60    Blank areas are intentional and mean the treatment did not include these items.     PT Evaluation Code: (Please list factors)  Patient History/Comorbidities: none  Examination: DDD and SI dysfunction  Clinical Presentation: stable  Clinical Decision Making: low     Patient History/  Comorbidities Examination  (body structures and functions, activity limitations, and/or participation restrictions) Clinical Presentation Clinical Decision Making (Complexity)   No documented Comorbidities or personal factors 1-2 Elements Stable and/or uncomplicated Low   1-2 documented comorbidities or personal factor 3 Elements Evolving clinical presentation with changing characteristics Moderate   3-4 documented comorbidities or personal factors 4 or more Unstable and unpredictable High              Zeynep Pugh PT  8/14/2020  9:23 AM

## 2021-07-03 NOTE — ADDENDUM NOTE
Addendum Note by Anahi Aguilar MD at 9/3/2020 11:00 AM     Author: Anahi Aguilar MD Service: -- Author Type: Physician    Filed: 9/4/2020  5:50 PM Encounter Date: 9/3/2020 Status: Signed    : Anahi Aguilar MD (Physician)    Addended by: ANAHI AGUILAR on: 9/4/2020 05:50 PM        Modules accepted: Orders

## 2021-07-21 ENCOUNTER — RECORDS - HEALTHEAST (OUTPATIENT)
Dept: ADMINISTRATIVE | Facility: CLINIC | Age: 71
End: 2021-07-21

## 2021-07-23 ENCOUNTER — RECORDS - HEALTHEAST (OUTPATIENT)
Dept: FAMILY MEDICINE | Facility: CLINIC | Age: 71
End: 2021-07-23

## 2021-07-23 DIAGNOSIS — N63.0 LUMP OR MASS IN BREAST: ICD-10-CM

## 2021-07-26 ENCOUNTER — MYC MEDICAL ADVICE (OUTPATIENT)
Dept: FAMILY MEDICINE | Facility: CLINIC | Age: 71
End: 2021-07-26

## 2021-07-26 DIAGNOSIS — M51.379 DEGENERATION OF LUMBAR OR LUMBOSACRAL INTERVERTEBRAL DISC: Primary | ICD-10-CM

## 2021-08-26 DIAGNOSIS — I10 ESSENTIAL HYPERTENSION, BENIGN: ICD-10-CM

## 2021-08-26 RX ORDER — POTASSIUM CHLORIDE 1500 MG/1
TABLET, EXTENDED RELEASE ORAL
Qty: 90 TABLET | Refills: 3 | Status: SHIPPED | OUTPATIENT
Start: 2021-08-26 | End: 2022-10-20

## 2021-08-31 ENCOUNTER — MYC MEDICAL ADVICE (OUTPATIENT)
Dept: FAMILY MEDICINE | Facility: CLINIC | Age: 71
End: 2021-08-31

## 2021-08-31 DIAGNOSIS — M53.3 SACROILIAC JOINT PAIN: Primary | ICD-10-CM

## 2021-09-29 ENCOUNTER — OFFICE VISIT (OUTPATIENT)
Dept: FAMILY MEDICINE | Facility: CLINIC | Age: 71
End: 2021-09-29
Payer: MEDICARE

## 2021-09-29 VITALS
HEART RATE: 77 BPM | BODY MASS INDEX: 32.86 KG/M2 | TEMPERATURE: 98.2 F | RESPIRATION RATE: 16 BRPM | WEIGHT: 208.25 LBS | SYSTOLIC BLOOD PRESSURE: 152 MMHG | DIASTOLIC BLOOD PRESSURE: 94 MMHG

## 2021-09-29 DIAGNOSIS — M53.3 SACROILIAC JOINT PAIN: ICD-10-CM

## 2021-09-29 DIAGNOSIS — Z23 NEED FOR PROPHYLACTIC VACCINATION AND INOCULATION AGAINST INFLUENZA: Primary | ICD-10-CM

## 2021-09-29 PROCEDURE — 99213 OFFICE O/P EST LOW 20 MIN: CPT | Mod: 25 | Performed by: FAMILY MEDICINE

## 2021-09-29 PROCEDURE — 90662 IIV NO PRSV INCREASED AG IM: CPT | Performed by: FAMILY MEDICINE

## 2021-09-29 PROCEDURE — G0008 ADMIN INFLUENZA VIRUS VAC: HCPCS | Performed by: FAMILY MEDICINE

## 2021-09-29 RX ORDER — PREDNISONE 20 MG/1
20 TABLET ORAL DAILY
Qty: 24 TABLET | Refills: 0 | Status: SHIPPED | OUTPATIENT
Start: 2021-09-29 | End: 2021-10-07

## 2021-09-29 RX ORDER — ACETAMINOPHEN 500 MG
1000 TABLET ORAL EVERY 6 HOURS PRN
COMMUNITY

## 2021-09-29 NOTE — PROGRESS NOTES
"    Assessment & Plan       ICD-10-CM    1. Need for prophylactic vaccination and inoculation against influenza  Z23 ADMIN INFLUENZA (For MEDICARE Patients ONLY) []   2. Sacroiliac joint pain  M53.3 predniSONE (DELTASONE) 20 MG tablet     Physical Therapy Referral     Saw [facility].      Saw chiropractor.    Prednisone 60 mg a day for 4 days in the morning for 4 days with food, then 40 mg a day in the morning for 4 days with food, 20 mg a day in the morning with food for 4 days.    No ibuprofen while on prednisone.  Tylenol is safe up to 3000 mg in a day so you can take at 1000 mg 3 times a day.    Annual wellness exam set for Oct.    If prednisone is not helping then please set appointment with spine again and ask about cortisone injections.    Ordered physical therapy at [facility] since patient has seen them before.  Since I am ordering physical therapy you do not have to do chiropractic at this point.    Flu shot today.      22 minutes spent on the date of the encounter doing chart review, history and exam, documentation and further activities per the note           No follow-ups on file.    Anahi Aguilar MD  [facility]    Braydon Negron is a 71 year old who presents for the following health issues     HPI   Back pain:  Saw [facility] Aug 31.  Pain since the end of July.  Told pelvis is not even and legs are not the same length and has SI issues. Recommended a chiropractor.  Seeing one in [place].  Did some treatments without large \"cracking\" or manipulations.  Using a cane to walk.  Cannot get out of bed without cane.  Tried Souses Tramadol an did not help.  Taking Ibuprofen 800 mg and now added Tylenol.  Recommended trying Prednisone from primary.  Spine doctor did not think she needed Gabapentin.  If have not been up for a while, then pain from butt down legs to hamstring area.  Suing a right heel cushion.  Did massage at the chiropractor and helped temporarily " but fel shaky after.  No loss of bowel or bladder function except cannot get to the bathroom fast enough.  Had numbness in left calf but gone after chiropratic.          Review of Systems         Objective    BP (!) 152/94 (BP Location: Left arm, Patient Position: Sitting, Cuff Size: Adult Regular)   Pulse 77   Temp 98.2  F (36.8  C) (Oral)   Resp 16   Wt 94.5 kg (208 lb 4 oz)   BMI 32.86 kg/m    Body mass index is 32.86 kg/m .  Physical Exam   GENERAL: healthy, alert and mild distress secondary to back pain  MS: no gross musculoskeletal defects noted, no edema, she does have pain to palpation of the lower lumbar spine and SI joints  Neuro: Deep to reflexes 2+, negative straight leg raise bilaterally

## 2021-09-29 NOTE — PATIENT INSTRUCTIONS
Saw [facility].      Saw chiropractor.    Prednisone 60 mg a day for 4 days in the morning for 4 days with food, then 40 mg a day in the morning for 4 days with food, 20 mg a day in the morning with food for 4 days.    No ibuprofen while on prednisone.  Tylenol is safe up to 3000 mg in a day so you can take at 1000 mg 3 times a day.    Annual wellness exam set for Oct.    If prednisone is not helping then please set appointment with spine again and ask about cortisone injections.    Ordered physical therapy at [facility] since patient has seen them before.  Since I am ordering physical therapy you do not have to do chiropractic at this point.    Flu shot today.

## 2021-10-07 ENCOUNTER — MYC MEDICAL ADVICE (OUTPATIENT)
Dept: FAMILY MEDICINE | Facility: CLINIC | Age: 71
End: 2021-10-07

## 2021-10-07 DIAGNOSIS — M53.3 SACROILIAC JOINT PAIN: ICD-10-CM

## 2021-10-07 RX ORDER — PREDNISONE 20 MG/1
TABLET ORAL
Qty: 24 TABLET | Refills: 0 | Status: SHIPPED | OUTPATIENT
Start: 2021-10-07 | End: 2022-08-10

## 2021-10-15 ENCOUNTER — TRANSFERRED RECORDS (OUTPATIENT)
Dept: HEALTH INFORMATION MANAGEMENT | Facility: CLINIC | Age: 71
End: 2021-10-15
Payer: MEDICARE

## 2021-10-16 ENCOUNTER — HEALTH MAINTENANCE LETTER (OUTPATIENT)
Age: 71
End: 2021-10-16

## 2021-10-17 ASSESSMENT — PATIENT HEALTH QUESTIONNAIRE - PHQ9
SUM OF ALL RESPONSES TO PHQ QUESTIONS 1-9: 10
10. IF YOU CHECKED OFF ANY PROBLEMS, HOW DIFFICULT HAVE THESE PROBLEMS MADE IT FOR YOU TO DO YOUR WORK, TAKE CARE OF THINGS AT HOME, OR GET ALONG WITH OTHER PEOPLE: NOT DIFFICULT AT ALL
SUM OF ALL RESPONSES TO PHQ QUESTIONS 1-9: 10

## 2021-10-17 ASSESSMENT — ACTIVITIES OF DAILY LIVING (ADL): CURRENT_FUNCTION: NO ASSISTANCE NEEDED

## 2021-10-18 ENCOUNTER — OFFICE VISIT (OUTPATIENT)
Dept: FAMILY MEDICINE | Facility: CLINIC | Age: 71
End: 2021-10-18
Payer: MEDICARE

## 2021-10-18 VITALS
DIASTOLIC BLOOD PRESSURE: 78 MMHG | SYSTOLIC BLOOD PRESSURE: 127 MMHG | HEIGHT: 67 IN | WEIGHT: 205 LBS | TEMPERATURE: 98 F | HEART RATE: 64 BPM | BODY MASS INDEX: 32.18 KG/M2

## 2021-10-18 DIAGNOSIS — I10 ESSENTIAL HYPERTENSION, BENIGN: ICD-10-CM

## 2021-10-18 DIAGNOSIS — E55.9 VITAMIN D DEFICIENCY: ICD-10-CM

## 2021-10-18 DIAGNOSIS — E78.5 HYPERLIPIDEMIA LDL GOAL <130: ICD-10-CM

## 2021-10-18 DIAGNOSIS — M53.3 SACROILIAC JOINT PAIN: ICD-10-CM

## 2021-10-18 DIAGNOSIS — I10 BENIGN ESSENTIAL HYPERTENSION: ICD-10-CM

## 2021-10-18 DIAGNOSIS — Z00.00 ENCOUNTER FOR MEDICARE ANNUAL WELLNESS EXAM: Primary | ICD-10-CM

## 2021-10-18 DIAGNOSIS — M51.379 DEGENERATION OF LUMBAR OR LUMBOSACRAL INTERVERTEBRAL DISC: ICD-10-CM

## 2021-10-18 LAB
ALBUMIN SERPL-MCNC: 4 G/DL (ref 3.5–5)
ALP SERPL-CCNC: 119 U/L (ref 45–120)
ALT SERPL W P-5'-P-CCNC: 50 U/L (ref 0–45)
ANION GAP SERPL CALCULATED.3IONS-SCNC: 13 MMOL/L (ref 5–18)
AST SERPL W P-5'-P-CCNC: 27 U/L (ref 0–40)
BILIRUB SERPL-MCNC: 0.8 MG/DL (ref 0–1)
BUN SERPL-MCNC: 19 MG/DL (ref 8–22)
CALCIUM SERPL-MCNC: 10.2 MG/DL (ref 8.5–10.5)
CHLORIDE BLD-SCNC: 97 MMOL/L (ref 98–107)
CHOLEST SERPL-MCNC: 185 MG/DL
CO2 SERPL-SCNC: 26 MMOL/L (ref 22–31)
CREAT SERPL-MCNC: 0.98 MG/DL (ref 0.6–1.1)
ERYTHROCYTE [DISTWIDTH] IN BLOOD BY AUTOMATED COUNT: 14.6 % (ref 10–15)
FASTING STATUS PATIENT QL REPORTED: YES
GFR SERPL CREATININE-BSD FRML MDRD: 59 ML/MIN/1.73M2
GLUCOSE BLD-MCNC: 114 MG/DL (ref 70–125)
HCT VFR BLD AUTO: 44.3 % (ref 35–47)
HDLC SERPL-MCNC: 76 MG/DL
HGB BLD-MCNC: 14.4 G/DL (ref 11.7–15.7)
LDLC SERPL CALC-MCNC: 88 MG/DL
MCH RBC QN AUTO: 29.1 PG (ref 26.5–33)
MCHC RBC AUTO-ENTMCNC: 32.5 G/DL (ref 31.5–36.5)
MCV RBC AUTO: 90 FL (ref 78–100)
PLATELET # BLD AUTO: 346 10E3/UL (ref 150–450)
POTASSIUM BLD-SCNC: 4.2 MMOL/L (ref 3.5–5)
PROT SERPL-MCNC: 7.1 G/DL (ref 6–8)
RBC # BLD AUTO: 4.94 10E6/UL (ref 3.8–5.2)
SODIUM SERPL-SCNC: 136 MMOL/L (ref 136–145)
TRIGL SERPL-MCNC: 103 MG/DL
WBC # BLD AUTO: 10.2 10E3/UL (ref 4–11)

## 2021-10-18 PROCEDURE — 85027 COMPLETE CBC AUTOMATED: CPT | Performed by: FAMILY MEDICINE

## 2021-10-18 PROCEDURE — 99213 OFFICE O/P EST LOW 20 MIN: CPT | Mod: 25 | Performed by: FAMILY MEDICINE

## 2021-10-18 PROCEDURE — 82306 VITAMIN D 25 HYDROXY: CPT | Performed by: FAMILY MEDICINE

## 2021-10-18 PROCEDURE — G0439 PPPS, SUBSEQ VISIT: HCPCS | Performed by: FAMILY MEDICINE

## 2021-10-18 PROCEDURE — 80053 COMPREHEN METABOLIC PANEL: CPT | Performed by: FAMILY MEDICINE

## 2021-10-18 PROCEDURE — 80061 LIPID PANEL: CPT | Performed by: FAMILY MEDICINE

## 2021-10-18 PROCEDURE — 36415 COLL VENOUS BLD VENIPUNCTURE: CPT | Performed by: FAMILY MEDICINE

## 2021-10-18 RX ORDER — HYDROCHLOROTHIAZIDE 50 MG/1
50 TABLET ORAL DAILY
Qty: 90 TABLET | Refills: 3 | Status: SHIPPED | OUTPATIENT
Start: 2021-10-18 | End: 2022-10-20

## 2021-10-18 RX ORDER — METOPROLOL SUCCINATE 25 MG/1
25 TABLET, EXTENDED RELEASE ORAL DAILY
Qty: 90 TABLET | Refills: 3 | Status: SHIPPED | OUTPATIENT
Start: 2021-10-18 | End: 2022-10-20

## 2021-10-18 RX ORDER — ATORVASTATIN CALCIUM 20 MG/1
TABLET, FILM COATED ORAL
Qty: 135 TABLET | Refills: 3 | Status: SHIPPED | OUTPATIENT
Start: 2021-10-18 | End: 2022-10-20

## 2021-10-18 ASSESSMENT — ACTIVITIES OF DAILY LIVING (ADL): CURRENT_FUNCTION: NO ASSISTANCE NEEDED

## 2021-10-18 ASSESSMENT — MIFFLIN-ST. JEOR: SCORE: 1483.53

## 2021-10-18 ASSESSMENT — PATIENT HEALTH QUESTIONNAIRE - PHQ9: SUM OF ALL RESPONSES TO PHQ QUESTIONS 1-9: 10

## 2021-10-18 NOTE — PROGRESS NOTES
"    The patient s PHQ-9 score is consistent with moderate depression. She was provided with information regarding depression and was advised to schedule a follow up appointment in 24 weeks to further address this issue.  Answers for HPI/ROS submitted by the patient on 10/17/2021  If you checked off any problems, how difficult have these problems made it for you to do your work, take care of things at home, or get along with other people?: Not difficult at all  PHQ9 TOTAL SCORE: 10  In general, how would you rate your overall physical health?: good  Frequency of exercise:: 2-3 days/week  Do you usually eat at least 4 servings of fruit and vegetables a day, include whole grains & fiber, and avoid regularly eating high fat or \"junk\" foods? : Yes  Taking medications regularly:: Yes  Medication side effects:: Not applicable  Activities of Daily Living: no assistance needed  Home safety: no safety concerns identified  Hearing Impairment:: no hearing concerns  In the past 6 months, have you been bothered by leaking of urine?: No  In general, how would you rate your overall mental or emotional health?: good  Additional concerns today:: No  Duration of exercise:: 15-30 minutes        "

## 2021-10-18 NOTE — PROGRESS NOTES
"SUBJECTIVE:   Migdalia Coronado is a 71 year old female who presents for Preventive Visit.      Patient has been advised of split billing requirements and indicates understanding: Yes   Are you in the first 12 months of your Medicare coverage?  No    Healthy Habits:     In general, how would you rate your overall health?  Good    Frequency of exercise:  2-3 days/week    Duration of exercise:  15-30 minutes    Do you usually eat at least 4 servings of fruit and vegetables a day, include whole grains    & fiber and avoid regularly eating high fat or \"junk\" foods?  Yes    Taking medications regularly:  Yes    Medication side effects:  Not applicable    Ability to successfully perform activities of daily living:  No assistance needed    Home Safety:  No safety concerns identified    Hearing Impairment:  No hearing concerns    In the past 6 months, have you been bothered by leaking of urine?  No    In general, how would you rate your overall mental or emotional health?  Good      PHQ-2 Total Score: 4    Additional concerns today:  No    Health Maintenance   Topic Date Due     DEXA  Declined at this time     ANNUAL REVIEW OF  ORDERS  Today     HEPATITIS C SCREENING  Declined     ZOSTER IMMUNIZATION (1 of 2) If you are interested in the new shingles shot, Shingrix, please check with insurance for coverage either in clinic or at a pharmacy. It is a 2 shot series 2-6 months apart.      FALL RISK ASSESSMENT  Today     COLORECTAL CANCER SCREENING  10/12/2021, declined now until after first of the year     PHQ-9  Today     MAMMO SCREENING  08/26/2021, will set when up to it     PAP FOLLOW-UP  09/03/2022     HPV FOLLOW-UP  09/03/2022     MEDICARE ANNUAL WELLNESS VISIT  Today     DTAP/TDAP/TD IMMUNIZATION (2 - Td or Tdap) 08/24/2025     LIPID  Today     ADVANCE CARE PLANNING  Declined     INFLUENZA VACCINE  Completed     Pneumococcal Vaccine: 65+ Years  Completed     COVID-19 Vaccine  Completed     IPV IMMUNIZATION  Aged Out     " MENINGITIS IMMUNIZATION  Aged Out     HEPATITIS B IMMUNIZATION  Aged Out       HPI:    SI pain:  Saw [facility] in Aug.  Seeing a  PT at [facility].  On Prednisone and helping.  SI pain.  If sitting or standing too long causes pain.  Bilateral pain shooting down the backs of her legs to below her knees in the morning.  Would like to visit with the spine surgeon at [facility].    Hypertension: Controlled on medication.    Do you feel safe in your environment? Yes    Have you ever done Advance Care Planning? (For example, a Health Directive, POLST, or a discussion with a medical provider or your loved ones about your wishes): No, advance care planning information given to patient to review.  Patient plans to discuss their wishes with loved ones or provider.         Fall risk  Fallen 2 or more times in the past year?: No  Any fall with injury in the past year?: No    Cognitive Screening   1) Repeat 3 items (Leader, Season, Table)    2) Clock draw: NORMAL  3) 3 item recall: Recalls 2 objects   Results: NORMAL clock, 1-2 items recalled: COGNITIVE IMPAIRMENT LESS LIKELY    Mini-CogTM Copyright ANDREW Fernandes. Licensed by the author for use in [facility]; reprinted with permission (james@[facility]). All rights reserved.      Do you have sleep apnea, excessive snoring or daytime drowsiness?: no    Reviewed and updated as needed this visit by clinical staff  Tobacco  Allergies  Meds              Reviewed and updated as needed this visit by Provider                Social History     Tobacco Use     Smoking status: Former Smoker     Types: Cigarettes     Smokeless tobacco: Never Used   Substance Use Topics     Alcohol use: Yes     Alcohol/week: 1.0 standard drinks     Comment: Alcoholic Drinks/day: occasionally     If you drink alcohol do you typically have >3 drinks per day or >7 drinks per week? No    Alcohol Use 10/17/2021   Prescreen: >3 drinks/day or >7 drinks/week? No               Current providers sharing in care for  "this patient include:   Patient Care Team:  Anahi Aguilar MD as PCP - General  Anahi Aguilar MD as Assigned PCP    The following health maintenance items are reviewed in Epic and correct as of today:  Health Maintenance Due   Topic Date Due     DEXA  Never done     ANNUAL REVIEW OF HM ORDERS  Never done     HEPATITIS C SCREENING  Never done     ZOSTER IMMUNIZATION (1 of 2) Never done     MEDICARE ANNUAL WELLNESS VISIT  09/03/2021     FALL RISK ASSESSMENT  09/03/2021     COLORECTAL CANCER SCREENING  10/12/2021     Lab work is in process  See orders    FHS-7: No flowsheet data found.      Pertinent mammograms are reviewed under the imaging tab.    Review of Systems  Constitutional, HEENT, cardiovascular, pulmonary, gi and gu systems are negative, except as otherwise noted.    OBJECTIVE:   /78   Pulse 64   Temp 98  F (36.7  C) (Oral)   Ht 1.695 m (5' 6.75\")   Wt 93 kg (205 lb)   BMI 32.35 kg/m   Estimated body mass index is 32.35 kg/m  as calculated from the following:    Height as of this encounter: 1.695 m (5' 6.75\").    Weight as of this encounter: 93 kg (205 lb).  Physical Exam  GENERAL: healthy, alert and no distress  EYES: Eyes grossly normal to inspection, PERRL and conjunctivae and sclerae normal  HENT: ear canals and TM's normal, nose and mouth without ulcers or lesions  NECK: no adenopathy, no asymmetry, masses, or scars and thyroid normal to palpation  RESP: lungs clear to auscultation - no rales, rhonchi or wheezes  BREAST: normal without masses, tenderness or nipple discharge and no palpable axillary masses or adenopathy  CV: regular rate and rhythm, normal S1 S2, no S3 or S4, no murmur, click or rub, no peripheral edema and peripheral pulses strong  ABDOMEN: soft, nontender, no hepatosplenomegaly, no masses and bowel sounds normal  MS: no gross musculoskeletal defects noted, no edema  SKIN: no suspicious lesions or rashes  NEURO: Normal strength and tone, mentation intact and " "speech normal  PSYCH: mentation appears normal, affect normal/bright    Diagnostic Test Results:  Labs reviewed in Epic    ASSESSMENT / PLAN:       ICD-10-CM    1. Encounter for Medicare annual wellness exam  Z00.00    2. Essential hypertension, benign  I10 metoprolol succinate ER (TOPROL-XL) 25 MG 24 hr tablet     Comprehensive metabolic panel (BMP + Alb, Alk Phos, ALT, AST, Total. Bili, TP)     CBC with platelets     Comprehensive metabolic panel (BMP + Alb, Alk Phos, ALT, AST, Total. Bili, TP)     CBC with platelets   3. Benign essential hypertension  I10 hydrochlorothiazide (HYDRODIURIL) 50 MG tablet   4. Degeneration of lumbar or lumbosacral intervertebral disc  M51.37 Orthopedic  Referral   5. Sacroiliac joint pain  M53.3 Orthopedic  Referral   6. Vitamin D deficiency  E55.9 Vitamin D Deficiency     Vitamin D Deficiency   7. Hyperlipidemia LDL goal <130  E78.5 Lipid Profile (Chol, Trig, HDL, LDL calc)     atorvastatin (LIPITOR) 20 MG tablet     Lipid Profile (Chol, Trig, HDL, LDL calc)     Ortho consult given to a spine doctor at [place].    Finish your prednisone and if the spine doctor feels you need more they can refill it I do not want to refill it before you see the spine doctor.    Continue physical therapy at [place].    We will ask you again at your follow-up in 6 months about the colonoscopy and the bone density.    Set a 40-minute med check in 6 months.    Please set mammogram when you are feeling up to it.  Ask for a 3D mammogram.  Phone #[phone].    We will offer dermatology consult at her 6-month follow-up.    Patient has been advised of split billing requirements and indicates understanding: Yes  COUNSELING:  Reviewed preventive health counseling, as reflected in patient instructions       Regular exercise       Healthy diet/nutrition    Estimated body mass index is 32.35 kg/m  as calculated from the following:    Height as of this encounter: 1.695 m (5' 6.75\").    Weight as of " this encounter: 93 kg (205 lb).    Weight management plan: Discussed healthy diet and exercise guidelines    She reports that she has quit smoking. Her smoking use included cigarettes. She has never used smokeless tobacco.      Appropriate preventive services were discussed with this patient, including applicable screening as appropriate for cardiovascular disease, diabetes, osteopenia/osteoporosis, and glaucoma.  As appropriate for age/gender, discussed screening for colorectal cancer, prostate cancer, breast cancer, and cervical cancer. Checklist reviewing preventive services available has been given to the patient.    Reviewed patients plan of care and provided an AVS. The Basic Care Plan (routine screening as documented in Health Maintenance) for Migdalia meets the Care Plan requirement. This Care Plan has been established and reviewed with the Patient.    Counseling Resources:  ATP IV Guidelines  Pooled Cohorts Equation Calculator  Breast Cancer Risk Calculator  Breast Cancer: Medication to Reduce Risk  FRAX Risk Assessment  ICSI Preventive Guidelines  Dietary Guidelines for Americans, 2010  Unyqe's MyPlate  ASA Prophylaxis  Lung CA Screening    Anahi Aguilar MD  [facility]    Identified Health Risks:  Answers for HPI/ROS submitted by the patient on 10/17/2021  If you checked off any problems, how difficult have these problems made it for you to do your work, take care of things at home, or get along with other people?: Not difficult at all  PHQ9 TOTAL SCORE: 10

## 2021-10-18 NOTE — PATIENT INSTRUCTIONS
"Ortho consult given to a spine doctor at [place].    Finish your prednisone and if the spine doctor feels you need more they can refill it I do not want to refill it before you see the spine doctor.    Continue physical therapy at [place].    We will ask you again at your follow-up in 6 months about the colonoscopy and the bone density.    Set a 40-minute med check in 6 months.    Please set mammogram when you are feeling up to it.  Ask for a 3D mammogram.  Phone #[phone].    We will offer dermatology consult at her 6-month follow-up.      You  Depression and Suicide in Older Adults    Nearly 2 million older Americans have some type of depression. Some of them even take their own lives. Yet depression among older adults is often ignored. Learn the warning signs. You may help spare a loved one needless pain. You may also save a life.   What is depression?  Depression is a common and serious illness that affects the way you think and feel. It is not a normal part of aging, nor is it a sign of weakness, a character flaw, or something you can snap out of. Most people with depression need treatment to get better. The most common symptom is a feeling of deep sadness. People who are depressed also may seem tired and listless. And nothing seems to give them pleasure. It s normal to grieve or be sad sometimes. But sadness lessens or passes with time. Depression rarely goes away or improves on its own. A person with clinical depression can't \"snap out of it.\" Other symptoms of depression are:     Sleeping more or less than normal    Eating more or less than normal    Having headaches, stomachaches, or other pains that don t go away    Feeling nervous,  empty,  or worthless    Crying a great deal    Thinking or talking about suicide or death    Loss of interest in activities previously enjoyed    Social isolation    Feeling confused or forgetful  What causes it?  The causes of depression aren t fully known. But it is thought " to result from a complex blend of these factors:     Biochemistry. Certain chemicals in the brain play a role.    Genes. Depression does run in families.    Life stress. Life stresses can also trigger depression in some people. Older adults often face many stressors, such as death of friends or a spouse, health problems, and financial concerns.    Chronic conditions. This includes conditions such as diabetes, heart disease, or cancer. These can cause symptoms of depression. Medicine side effects can cause changes in thoughts and behaviors.  How you can help  Often, depressed people may not want to ask for help. When they do, they may be ignored. Or, they may receive the wrong treatment. You can help by showing parents and older friends love and support. If they seem depressed, don t lecture the person, ignore the symptoms, or discount the symptoms as a  normal  part of aging -which they are not. Get involved, listen, and show interest and support.   Help them understand that depression is a treatable illness. Tell them you can help them find the right treatment. Offer to go to their healthcare provider's appointment with them for support when the symptoms are discussed. With their approval, contact a local mental health center, social service agency, or hospital about services.   You can be an advocate for him or her at healthcare appointments. Many older adults have chronic illnesses that can cause symptoms of depression. Medicine side effects can change thoughts and behaviors. You can help make sure that the healthcare provider looks at all of these factors. He or she should refer your family member or friend to a mental healthcare provider when needed. in some cases, untreated depression can lead to a misdiagnosis. A person may be diagnosed with a brain disorder such as dementia. If the healthcare provider does not take the issue of depression seriously, help your family member or friend to find another provider.    Don't be afraid to ask  If you think an older person you care about could be suicidal, ask,  Have you thought about suicide?  Most people will tell you the truth. If they say  yes,  they may already have a plan for how and when they will attempt it. Find out as much as you can. The more detailed the plan, and the easier it is to carry out, the more danger the person is in right now. Tell the person you are there for them and do not want them to harm him or herself. Don't wait to get help for the person. Call the person's healthcare provider, local hospital, or emergency services.   To learn more    National Suicide Prevention Lifeline (crisis hotline) 472-039-CUJJ (114-580-8391)    National [place] of Mental Zofuxm954-865-0161ttd.[facility] Hospital[facility].nih.gov    National [place] on Mental Obqqwco425-896-3696pfu.greg.org    Mental Health Eoegyuc224-840-5041zhm.[facility].org    National Suicide Xzasdul073-SPTCGTF (262-300-5678)    Call 911  Never leave the person alone. A person who is actively suicidal needs psychiatric care right away. They will need constant supervision. Never leave the person out of sight. Call 911 or the national 24-hour suicide crisis hotline at 429-931-QFOT (100-051-7840). You can also take the person to the closest emergency room.   Lm last reviewed this educational content on 5/1/2020 2000-2021 [employer]. All rights reserved. This information is not intended as a substitute for professional medical care. Always follow your healthcare professional's instructions.                Health Maintenance   Topic Date Due     DEXA  Declined at this time     ANNUAL REVIEW OF HM ORDERS  Today     HEPATITIS C SCREENING  Declined     ZOSTER IMMUNIZATION (1 of 2) If you are interested in the new shingles shot, Shingrix, please check with insurance for coverage either in clinic or at a pharmacy. It is a 2 shot series 2-6 months apart.      FALL RISK ASSESSMENT  Today     COLORECTAL CANCER SCREENING   10/12/2021, declined now until after first of the year     PHQ-9  Today     MAMMO SCREENING  08/26/2021, will set when up to it     PAP FOLLOW-UP  09/03/2022     HPV FOLLOW-UP  09/03/2022     MEDICARE ANNUAL WELLNESS VISIT  Today     DTAP/TDAP/TD IMMUNIZATION (2 - Td or Tdap) 08/24/2025     LIPID  Today     ADVANCE CARE PLANNING  Declined     INFLUENZA VACCINE  Completed     Pneumococcal Vaccine: 65+ Years  Completed     COVID-19 Vaccine  Completed     IPV IMMUNIZATION  Aged Out     MENINGITIS IMMUNIZATION  Aged Out     HEPATITIS B IMMUNIZATION  Aged Out

## 2021-10-19 LAB — DEPRECATED CALCIDIOL+CALCIFEROL SERPL-MC: 52 UG/L (ref 30–80)

## 2021-10-27 ENCOUNTER — TRANSFERRED RECORDS (OUTPATIENT)
Dept: HEALTH INFORMATION MANAGEMENT | Facility: CLINIC | Age: 71
End: 2021-10-27
Payer: MEDICARE

## 2021-11-22 ENCOUNTER — TRANSFERRED RECORDS (OUTPATIENT)
Dept: HEALTH INFORMATION MANAGEMENT | Facility: CLINIC | Age: 71
End: 2021-11-22
Payer: MEDICARE

## 2021-11-24 DIAGNOSIS — R52 PAIN: ICD-10-CM

## 2021-11-24 RX ORDER — IBUPROFEN 800 MG/1
TABLET, FILM COATED ORAL
Qty: 90 TABLET | Refills: 3 | Status: SHIPPED | OUTPATIENT
Start: 2021-11-24 | End: 2023-03-01

## 2021-12-13 ENCOUNTER — MYC MEDICAL ADVICE (OUTPATIENT)
Dept: FAMILY MEDICINE | Facility: CLINIC | Age: 71
End: 2021-12-13
Payer: MEDICARE

## 2021-12-28 ENCOUNTER — TRANSFERRED RECORDS (OUTPATIENT)
Dept: HEALTH INFORMATION MANAGEMENT | Facility: CLINIC | Age: 71
End: 2021-12-28
Payer: MEDICARE

## 2021-12-28 LAB — PHQ9 SCORE: 9

## 2022-01-10 ENCOUNTER — TRANSFERRED RECORDS (OUTPATIENT)
Dept: HEALTH INFORMATION MANAGEMENT | Facility: CLINIC | Age: 72
End: 2022-01-10
Payer: MEDICARE

## 2022-02-11 ENCOUNTER — TRANSFERRED RECORDS (OUTPATIENT)
Dept: HEALTH INFORMATION MANAGEMENT | Facility: CLINIC | Age: 72
End: 2022-02-11
Payer: MEDICARE

## 2022-02-11 ENCOUNTER — MYC MEDICAL ADVICE (OUTPATIENT)
Dept: FAMILY MEDICINE | Facility: CLINIC | Age: 72
End: 2022-02-11
Payer: MEDICARE

## 2022-03-02 ENCOUNTER — TRANSFERRED RECORDS (OUTPATIENT)
Dept: HEALTH INFORMATION MANAGEMENT | Facility: CLINIC | Age: 72
End: 2022-03-02
Payer: MEDICARE

## 2022-03-24 DIAGNOSIS — R09.82 POST-NASAL DRIP: ICD-10-CM

## 2022-03-25 RX ORDER — FLUTICASONE PROPIONATE 50 MCG
SPRAY, SUSPENSION (ML) NASAL
Qty: 48 G | Refills: 3 | Status: SHIPPED | OUTPATIENT
Start: 2022-03-25 | End: 2023-05-20

## 2022-03-25 NOTE — TELEPHONE ENCOUNTER
"Routing refill request to provider for review/approval because:  A break in medication    Last Written Prescription Date:  10/9/2020  Last Fill Quantity: 48 g,  # refills: 3   Last office visit provider:  10/18/2021    Requested Prescriptions   Pending Prescriptions Disp Refills     fluticasone (FLONASE) 50 MCG/ACT nasal spray [Pharmacy Med Name: FLUTICASONE 50MCG NASAL SP (120) RX] 48 g 3     Sig: SHAKE LIQUID AND USE 2 SPRAYS IN EACH NOSTRIL DAILY AS NEEDED       Nasal Allergy Protocol Passed - 3/24/2022 10:29 AM        Passed - Patient is age 12 or older        Passed - Recent (12 mo) or future (30 days) visit within the authorizing provider's specialty     Patient has had an office visit with the authorizing provider or a provider within the authorizing providers department within the previous 12 mos or has a future within next 30 days. See \"Patient Info\" tab in inbasket, or \"Choose Columns\" in Meds & Orders section of the refill encounter.              Passed - Medication is active on med list             Alicia Shaw RN 03/25/22 12:48 PM  "

## 2022-04-18 ENCOUNTER — OFFICE VISIT (OUTPATIENT)
Dept: FAMILY MEDICINE | Facility: CLINIC | Age: 72
End: 2022-04-18
Payer: MEDICARE

## 2022-04-18 VITALS
WEIGHT: 212.13 LBS | DIASTOLIC BLOOD PRESSURE: 89 MMHG | RESPIRATION RATE: 16 BRPM | TEMPERATURE: 98.1 F | BODY MASS INDEX: 33.47 KG/M2 | SYSTOLIC BLOOD PRESSURE: 137 MMHG | HEART RATE: 71 BPM

## 2022-04-18 DIAGNOSIS — Z12.11 SCREEN FOR COLON CANCER: ICD-10-CM

## 2022-04-18 DIAGNOSIS — Z12.11 ENCOUNTER FOR SCREENING FOR MALIGNANT NEOPLASM OF COLON: ICD-10-CM

## 2022-04-18 DIAGNOSIS — M51.379 DEGENERATION OF LUMBAR OR LUMBOSACRAL INTERVERTEBRAL DISC: ICD-10-CM

## 2022-04-18 DIAGNOSIS — E83.52 HYPERCALCEMIA: ICD-10-CM

## 2022-04-18 DIAGNOSIS — I10 ESSENTIAL HYPERTENSION, BENIGN: Primary | ICD-10-CM

## 2022-04-18 DIAGNOSIS — F41.1 ANXIETY STATE: ICD-10-CM

## 2022-04-18 DIAGNOSIS — R74.01 ELEVATED ALT MEASUREMENT: ICD-10-CM

## 2022-04-18 DIAGNOSIS — E55.9 VITAMIN D DEFICIENCY: ICD-10-CM

## 2022-04-18 DIAGNOSIS — Z83.719 FAMILY HISTORY OF COLONIC POLYPS: ICD-10-CM

## 2022-04-18 DIAGNOSIS — Z11.59 NEED FOR HEPATITIS C SCREENING TEST: ICD-10-CM

## 2022-04-18 DIAGNOSIS — E78.5 HYPERLIPIDEMIA LDL GOAL <130: ICD-10-CM

## 2022-04-18 LAB
ALBUMIN SERPL-MCNC: 3.8 G/DL (ref 3.5–5)
ALT SERPL W P-5'-P-CCNC: 18 U/L (ref 0–45)
ANION GAP SERPL CALCULATED.3IONS-SCNC: 12 MMOL/L (ref 5–18)
BUN SERPL-MCNC: 18 MG/DL (ref 8–22)
CALCIUM SERPL-MCNC: 9.5 MG/DL (ref 8.5–10.5)
CHLORIDE BLD-SCNC: 101 MMOL/L (ref 98–107)
CHOLEST SERPL-MCNC: 159 MG/DL
CO2 SERPL-SCNC: 27 MMOL/L (ref 22–31)
CREAT SERPL-MCNC: 0.82 MG/DL (ref 0.6–1.1)
FASTING STATUS PATIENT QL REPORTED: YES
GFR SERPL CREATININE-BSD FRML MDRD: 77 ML/MIN/1.73M2
GLUCOSE BLD-MCNC: 106 MG/DL (ref 70–125)
HDLC SERPL-MCNC: 61 MG/DL
LDLC SERPL CALC-MCNC: 87 MG/DL
PHOSPHATE SERPL-MCNC: 3.4 MG/DL (ref 2.5–4.5)
POTASSIUM BLD-SCNC: 3.9 MMOL/L (ref 3.5–5)
SODIUM SERPL-SCNC: 140 MMOL/L (ref 136–145)
TRIGL SERPL-MCNC: 55 MG/DL

## 2022-04-18 PROCEDURE — 36415 COLL VENOUS BLD VENIPUNCTURE: CPT | Performed by: FAMILY MEDICINE

## 2022-04-18 PROCEDURE — 84460 ALANINE AMINO (ALT) (SGPT): CPT | Performed by: FAMILY MEDICINE

## 2022-04-18 PROCEDURE — 99214 OFFICE O/P EST MOD 30 MIN: CPT | Performed by: FAMILY MEDICINE

## 2022-04-18 PROCEDURE — 80069 RENAL FUNCTION PANEL: CPT | Performed by: FAMILY MEDICINE

## 2022-04-18 PROCEDURE — 86803 HEPATITIS C AB TEST: CPT | Performed by: FAMILY MEDICINE

## 2022-04-18 PROCEDURE — 80061 LIPID PANEL: CPT | Performed by: FAMILY MEDICINE

## 2022-04-18 RX ORDER — CELECOXIB 100 MG/1
100 CAPSULE ORAL 2 TIMES DAILY
Qty: 180 CAPSULE | Refills: 3 | Status: SHIPPED | OUTPATIENT
Start: 2022-04-18 | End: 2024-01-24

## 2022-04-18 RX ORDER — GABAPENTIN 300 MG/1
300 CAPSULE ORAL 3 TIMES DAILY
Qty: 270 CAPSULE | Refills: 3 | Status: SHIPPED | OUTPATIENT
Start: 2022-04-18 | End: 2023-09-06

## 2022-04-18 RX ORDER — GABAPENTIN 300 MG/1
300 CAPSULE ORAL 3 TIMES DAILY
COMMUNITY
Start: 2022-04-04 | End: 2022-04-18

## 2022-04-18 NOTE — PROGRESS NOTES
Assessment & Plan       ICD-10-CM    1. Benign Essential Hypertension  I10 Renal panel (Alb, BUN, Ca, Cl, CO2, Creat, Gluc, Phos, K, Na)     Renal panel (Alb, BUN, Ca, Cl, CO2, Creat, Gluc, Phos, K, Na)   2. Vitamin D deficiency  E55.9    3. Need for hepatitis C screening test  Z11.59 Hepatitis C Screen Reflex to HCV RNA Quant and Genotype     Hepatitis C Screen Reflex to HCV RNA Quant and Genotype   4. Screen for colon cancer  Z12.11    5. Encounter for screening for malignant neoplasm of colon  Z12.11    6. Lumbar Disc Degeneration  M51.37 gabapentin (NEURONTIN) 300 MG capsule   7. Hyperlipidemia LDL goal <130  E78.5 Lipid panel reflex to direct LDL Fasting     Lipid panel reflex to direct LDL Fasting   8. Anxiety  F41.1    9. Family history of colonic polyps  Z83.71    10. Hypercalcemia  E83.52    11. Degeneration of lumbar or lumbosacral intervertebral disc  M51.37 celecoxib (CELEBREX) 100 MG capsule   12. Elevated ALT measurement  R74.01 ALT     ALT     Please set annual wellness for October and come fasting.      If you are interested in the new shingles shot, Shingrix, please check with insurance for coverage either in clinic or at a pharmacy. It is a 2 shot series 2-6 months apart.     Declined hepatitis B shots today.    Declined Dexa scan.    Continue with the physical therapy with [facility].  If symptoms persist or get worse, good idea to set down with a spine surgeon to see if surgery is definitely recommended and if it would definitely help you.    Monitor your mood.  If not improving and if you like to see a counselor let me know I will give you some options.  If you would like to discuss the medication let me know.    Refilled gabapentin 300 mg 3 times a day.    Please set mammogram when you are feeling up to it.  Ask for a 3D mammogram.  Phone #[phone].    Declined Dermatology consult today.    Refilled Celebrex generic name celecoxib 100 mg twice a day.  You can use that as needed or twice a day  "consistently.  You cannot use ibuprofen meds while you are on this med.  Tylenol is safe.    Would like to hold off on colonoscopy for now.      30 minutes spent on the date of the encounter doing chart review, history and exam, documentation and further activities per the note       BMI:   Estimated body mass index is 33.47 kg/m  as calculated from the following:    Height as of 10/18/21: 1.695 m (5' 6.75\").    Weight as of this encounter: 96.2 kg (212 lb 2 oz).           No follow-ups on file.    Anahi Aguilar MD  [facility]    Braydon Negron is a 71 year old who presents for the following health issues     History of Present Illness       Reason for visit:  Med check    She eats 2-3 servings of fruits and vegetables daily.She consumes 0 sweetened beverage(s) daily.She exercises with enough effort to increase her heart rate 20 to 29 minutes per day.  She exercises with enough effort to increase her heart rate 3 or less days per week.   She is taking medications regularly.       Hypertension:   This has been well controlled on medication.  She does not complain of any chest pain.    Back:  Sacroiliitis, lumbar  disc disease, spondylolisthesis and spinal stenosis.  She has seen [facility] and [facility].  Since [facility] has had 4 injections.  Starting to improve some.  Now doing PT at [facility] and helping.  Uses cane when out of the house. On Gabapentin. Makes her eyes tired but it is helping her pain. Not too fatigued wit this.  Some weakness in legs if stands too long.    Depression: Feels this is from not being able to do much with her back pain.  She feels this is improving and does not wish to see a counselor or be on any medication.  No other questions or concerns.          Review of Systems         Objective    /89 (BP Location: Left arm, Patient Position: Sitting, Cuff Size: Adult Regular)   Pulse 71   Temp 98.1  F (36.7  C) (Oral)   Resp 16   Wt 96.2 kg " (212 lb 2 oz)   BMI 33.47 kg/m    Body mass index is 33.47 kg/m .  Physical Exam   GENERAL: healthy, alert and no distress  RESP: lungs clear to auscultation - no rales, rhonchi or wheezes  CV: regular rate and rhythm, normal S1 S2, no S3 or S4, no murmur, click or rub, no peripheral edema and peripheral pulses strong

## 2022-04-18 NOTE — PATIENT INSTRUCTIONS
Please set annual wellness for October and come fasting.      If you are interested in the new shingles shot, Shingrix, please check with insurance for coverage either in clinic or at a pharmacy. It is a 2 shot series 2-6 months apart.     Declined hepatitis B shots today.    Declined Dexa scan.    Continue with the physical therapy with [facility].  If symptoms persist or get worse, good idea to set down with a spine surgeon to see if surgery is definitely recommended and if it would definitely help you.    Monitor your mood.  If not improving and if you like to see a counselor let me know I will give you some options.  If you would like to discuss the medication let me know.    Refilled gabapentin 300 mg 3 times a day.    Please set mammogram when you are feeling up to it.  Ask for a 3D mammogram.  Phone #[phone].    Declined Dermatology consult today.    Refilled Celebrex generic name celecoxib 100 mg twice a day.  You can use that as needed or twice a day consistently.  You cannot use ibuprofen meds while you are on this med.  Tylenol is safe.    Would like to hold off on colonoscopy for now.

## 2022-04-19 LAB — HCV AB SERPL QL IA: NONREACTIVE

## 2022-06-14 ENCOUNTER — MYC MEDICAL ADVICE (OUTPATIENT)
Dept: FAMILY MEDICINE | Facility: CLINIC | Age: 72
End: 2022-06-14
Payer: MEDICARE

## 2022-06-14 DIAGNOSIS — Z01.00 VISIT FOR EYE AND VISION EXAM: Primary | ICD-10-CM

## 2022-07-25 ENCOUNTER — ANCILLARY PROCEDURE (OUTPATIENT)
Dept: MAMMOGRAPHY | Facility: CLINIC | Age: 72
End: 2022-07-25
Attending: FAMILY MEDICINE
Payer: MEDICARE

## 2022-07-25 DIAGNOSIS — Z12.31 VISIT FOR SCREENING MAMMOGRAM: ICD-10-CM

## 2022-07-25 PROCEDURE — 77067 SCR MAMMO BI INCL CAD: CPT

## 2022-08-05 ASSESSMENT — PATIENT HEALTH QUESTIONNAIRE - PHQ9
10. IF YOU CHECKED OFF ANY PROBLEMS, HOW DIFFICULT HAVE THESE PROBLEMS MADE IT FOR YOU TO DO YOUR WORK, TAKE CARE OF THINGS AT HOME, OR GET ALONG WITH OTHER PEOPLE: NOT DIFFICULT AT ALL
SUM OF ALL RESPONSES TO PHQ QUESTIONS 1-9: 5
SUM OF ALL RESPONSES TO PHQ QUESTIONS 1-9: 5

## 2022-08-10 ENCOUNTER — OFFICE VISIT (OUTPATIENT)
Dept: FAMILY MEDICINE | Facility: CLINIC | Age: 72
End: 2022-08-10
Payer: MEDICARE

## 2022-08-10 VITALS
SYSTOLIC BLOOD PRESSURE: 137 MMHG | HEART RATE: 81 BPM | RESPIRATION RATE: 16 BRPM | DIASTOLIC BLOOD PRESSURE: 80 MMHG | WEIGHT: 204.13 LBS | BODY MASS INDEX: 32.21 KG/M2 | TEMPERATURE: 98.2 F

## 2022-08-10 DIAGNOSIS — Z12.11 ENCOUNTER FOR SCREENING FOR MALIGNANT NEOPLASM OF COLON: ICD-10-CM

## 2022-08-10 DIAGNOSIS — R11.0 NAUSEA: Primary | ICD-10-CM

## 2022-08-10 DIAGNOSIS — R11.0 NAUSEA: ICD-10-CM

## 2022-08-10 DIAGNOSIS — M25.50 ARTHRALGIA, UNSPECIFIED JOINT: ICD-10-CM

## 2022-08-10 DIAGNOSIS — D12.6 ADENOMATOUS POLYP OF COLON, UNSPECIFIED PART OF COLON: ICD-10-CM

## 2022-08-10 DIAGNOSIS — L98.9 SKIN LESION: Primary | ICD-10-CM

## 2022-08-10 DIAGNOSIS — R82.71 BACTERIURIA: ICD-10-CM

## 2022-08-10 LAB
ALBUMIN UR-MCNC: NEGATIVE MG/DL
APPEARANCE UR: CLEAR
BACTERIA #/AREA URNS HPF: ABNORMAL /HPF
BILIRUB UR QL STRIP: NEGATIVE
COLOR UR AUTO: YELLOW
ERYTHROCYTE [DISTWIDTH] IN BLOOD BY AUTOMATED COUNT: 13.1 % (ref 10–15)
GLUCOSE UR STRIP-MCNC: NEGATIVE MG/DL
HCT VFR BLD AUTO: 37.3 % (ref 35–47)
HGB BLD-MCNC: 12.6 G/DL (ref 11.7–15.7)
HGB UR QL STRIP: NEGATIVE
KETONES UR STRIP-MCNC: ABNORMAL MG/DL
LEUKOCYTE ESTERASE UR QL STRIP: ABNORMAL
MCH RBC QN AUTO: 29.6 PG (ref 26.5–33)
MCHC RBC AUTO-ENTMCNC: 33.8 G/DL (ref 31.5–36.5)
MCV RBC AUTO: 88 FL (ref 78–100)
NITRATE UR QL: NEGATIVE
PH UR STRIP: 6 [PH] (ref 5–8)
PLATELET # BLD AUTO: 280 10E3/UL (ref 150–450)
RBC # BLD AUTO: 4.25 10E6/UL (ref 3.8–5.2)
RBC #/AREA URNS AUTO: ABNORMAL /HPF
SP GR UR STRIP: 1.02 (ref 1–1.03)
SQUAMOUS #/AREA URNS AUTO: ABNORMAL /LPF
UROBILINOGEN UR STRIP-ACNC: 0.2 E.U./DL
WBC # BLD AUTO: 5.2 10E3/UL (ref 4–11)
WBC #/AREA URNS AUTO: ABNORMAL /HPF

## 2022-08-10 PROCEDURE — 81374 HLA I TYPING 1 ANTIGEN LR: CPT | Performed by: FAMILY MEDICINE

## 2022-08-10 PROCEDURE — 86038 ANTINUCLEAR ANTIBODIES: CPT | Performed by: FAMILY MEDICINE

## 2022-08-10 PROCEDURE — 99214 OFFICE O/P EST MOD 30 MIN: CPT | Performed by: FAMILY MEDICINE

## 2022-08-10 PROCEDURE — 36415 COLL VENOUS BLD VENIPUNCTURE: CPT | Performed by: FAMILY MEDICINE

## 2022-08-10 PROCEDURE — 87086 URINE CULTURE/COLONY COUNT: CPT | Performed by: FAMILY MEDICINE

## 2022-08-10 PROCEDURE — 86431 RHEUMATOID FACTOR QUANT: CPT | Performed by: FAMILY MEDICINE

## 2022-08-10 PROCEDURE — 85027 COMPLETE CBC AUTOMATED: CPT | Performed by: FAMILY MEDICINE

## 2022-08-10 PROCEDURE — 86039 ANTINUCLEAR ANTIBODIES (ANA): CPT | Performed by: FAMILY MEDICINE

## 2022-08-10 PROCEDURE — 81001 URINALYSIS AUTO W/SCOPE: CPT | Performed by: FAMILY MEDICINE

## 2022-08-10 ASSESSMENT — PATIENT HEALTH QUESTIONNAIRE - PHQ9
10. IF YOU CHECKED OFF ANY PROBLEMS, HOW DIFFICULT HAVE THESE PROBLEMS MADE IT FOR YOU TO DO YOUR WORK, TAKE CARE OF THINGS AT HOME, OR GET ALONG WITH OTHER PEOPLE: NOT DIFFICULT AT ALL
SUM OF ALL RESPONSES TO PHQ QUESTIONS 1-9: 5

## 2022-08-10 NOTE — PATIENT INSTRUCTIONS
Today we will check a urinalysis to check for urinary tract or bladder infection.    I am putting in a referral to dermatology consultants for them to look at that patch on the back of your head.  Please ask them if they think this could be psoriasis.  If they do feel that psoriasis please let me know and I am going to refer you to a rheumatologist to look for a disorder called psoriatic arthritis that goes along with psoriasis and joint problems.    Today we will check blood work for autoimmune arthritis is like rheumatoid arthritis, psoriatic arthritis and other spondyloarthropathies.  If any of the blood tests are positive I will refer you to rheumatology.    Ordering a stool test for H. pylori a stomach bacteria.    For nausea in the morning you could try up on the Tums, [place] or try to get food in your stomach and see if that helps.  If needed you could add a Pepcid 20 mg.    Please let me know in 2 weeks if the symptoms are persisting or anytime getting worse that is that nausea you are experiencing in the morning.  Then I would like to order a CT of the abdomen and pelvis.    I am ordering a colonoscopy and you can set that up when you are ready.    Wellness exam in Oct.

## 2022-08-10 NOTE — PROGRESS NOTES
Assessment & Plan       ICD-10-CM    1. Skin lesion  L98.9 Adult Dermatology Referral   2. Encounter for screening for malignant neoplasm of colon  Z12.11    3. Nausea  R11.0 CBC with platelets     UA Macro with Reflex to Micro and Culture - lab collect     Helicobacter pylori Antigen Stool     Helicobacter pylori Antigen Stool     UA Macro with Reflex to Micro and Culture - lab collect     CBC with platelets     Urine Microscopic   4. Arthralgia, unspecified joint  M25.50 Anti Nuclear Halley IgG by IFA with Reflex     HLA-B27 Typing     Rheumatoid factor     Anti Nuclear Halley IgG by IFA with Reflex     HLA-B27 Typing     Rheumatoid factor   5. Adenomatous polyp of colon, unspecified part of colon  D12.6 Adult GI  Referral - Procedure Only     Today we will check a urinalysis to check for urinary tract or bladder infection.    I am putting in a referral to dermatology consultants for them to look at that patch on the back of your head.  Please ask them if they think this could be psoriasis.  If they do feel that psoriasis please let me know and I am going to refer you to a rheumatologist to look for a disorder called psoriatic arthritis that goes along with psoriasis and joint problems.    Today we will check blood work for autoimmune arthritis is like rheumatoid arthritis, psoriatic arthritis and other spondyloarthropathies.  If any of the blood tests are positive I will refer you to rheumatology.    Ordering a stool test for H. pylori a stomach bacteria.    For nausea in the morning you could try up on the Tums, Grecia-Gilchrist or try to get food in your stomach and see if that helps.  If needed you could add a Pepcid 20 mg.    Please let me know in 2 weeks if the symptoms are persisting or anytime getting worse that is that nausea you are experiencing in the morning.  Then I would like to order a CT of the abdomen and pelvis.    I am ordering a colonoscopy and you can set that up when you are  ready.    Wellness exam in Oct.    This patient seen and examined with the physician's assistant student.      30 minutes spent on the date of the encounter doing chart review, history and exam, documentation and further activities per the note            No follow-ups on file.      Suellen Aguilar MD  [facility]    Braydon Negron is a 71 year old, presenting for the following health issues:  RECHECK (Follow up-ongoing back concerns, wondering if she has psoriatic arthritis which is causing back pain, rashes on neck off/ on, bending in toe nails )      Patient had 4 injections and states her back is feeling better.     Rash on back of hairline for 3 years on and off. Has not seen dermatologist for it. Has ridges in toe nails of left foot. Thinks she may have psoriasis potentially. Has never received diagnosis.     Past few weeks has had nausea in morning. Had icecream last night and didn't feel well. No jaundice, has some intermittent abdominal pain, intermittent constipation. Nausea usually on empty stomach.     Has tried alkaseltzer and helps sometimes. States her father always drank lots of alkaseltzer but is unsure of reason.         History of Present Illness       Back Pain:  She presents for follow up of back pain. Patient's back pain is a chronic problem.  Location of back pain:  Right lower back and left lower back  Description of back pain: dull ache  Back pain spreads: left thigh and left knee    Since patient first noticed back pain, pain is: always present, but gets better and worse  Does back pain interfere with her job:  Not applicable      She eats 2-3 servings of fruits and vegetables daily.She consumes 0 sweetened beverage(s) daily.She exercises with enough effort to increase her heart rate 10 to 19 minutes per day.  She exercises with enough effort to increase her heart rate 3 or less days per week.   She is taking medications  regularly.    Today's PHQ-9         PHQ-9 Total Score: 5    PHQ-9 Q9 Thoughts of better off dead/self-harm past 2 weeks :   Not at all    How difficult have these problems made it for you to do your work, take care of things at home, or get along with other people: Not difficult at all             Review of Systems   Constitutional, HEENT, cardiovascular, pulmonary, gi and gu systems are negative, except as otherwise noted.      Objective    /80 (BP Location: Left arm, Patient Position: Sitting, Cuff Size: Adult Large)   Pulse 81   Temp 98.2  F (36.8  C) (Oral)   Resp 16   Wt 92.6 kg (204 lb 2 oz)   BMI 32.21 kg/m    Body mass index is 32.21 kg/m .  Physical Exam   GENERAL: healthy, alert and no distress  RESP: lungs clear to auscultation - no rales, rhonchi or wheezes  CV: regular rate and rhythm, normal S1 S2, no S3 or S4, no murmur, click or rub, no peripheral edema and peripheral pulses strong  SKIN: on back of head, base of scalp ~2cm lesion of scaly plaque.  It was a red base with right crusted plaque                    .  ..

## 2022-08-11 LAB
ANA PAT SER IF-IMP: ABNORMAL
ANA SER QL IF: ABNORMAL
ANA TITR SER IF: ABNORMAL {TITER}
RHEUMATOID FACT SER NEPH-ACNC: <6 IU/ML

## 2022-08-11 PROCEDURE — 87338 HPYLORI STOOL AG IA: CPT | Performed by: FAMILY MEDICINE

## 2022-08-12 LAB
BACTERIA UR CULT: NO GROWTH
H PYLORI AG STL QL IA: NEGATIVE

## 2022-08-16 DIAGNOSIS — R76.8 POSITIVE ANTINUCLEAR ANTIBODY: Primary | ICD-10-CM

## 2022-08-16 LAB
B LOCUS: NORMAL
B27TEST METHOD: NORMAL

## 2022-08-23 ENCOUNTER — E-VISIT (OUTPATIENT)
Dept: FAMILY MEDICINE | Facility: CLINIC | Age: 72
End: 2022-08-23
Payer: MEDICARE

## 2022-08-23 DIAGNOSIS — F41.9 ANXIETY: Primary | ICD-10-CM

## 2022-08-23 PROCEDURE — 99421 OL DIG E/M SVC 5-10 MIN: CPT | Performed by: FAMILY MEDICINE

## 2022-08-23 RX ORDER — ESCITALOPRAM OXALATE 10 MG/1
10 TABLET ORAL DAILY
Qty: 30 TABLET | Refills: 1 | Status: SHIPPED | OUTPATIENT
Start: 2022-08-23 | End: 2022-08-25

## 2022-08-23 ASSESSMENT — ANXIETY QUESTIONNAIRES
4. TROUBLE RELAXING: NEARLY EVERY DAY
GAD7 TOTAL SCORE: 17
7. FEELING AFRAID AS IF SOMETHING AWFUL MIGHT HAPPEN: MORE THAN HALF THE DAYS
5. BEING SO RESTLESS THAT IT IS HARD TO SIT STILL: SEVERAL DAYS
3. WORRYING TOO MUCH ABOUT DIFFERENT THINGS: NEARLY EVERY DAY
GAD7 TOTAL SCORE: 17
GAD7 TOTAL SCORE: 17
7. FEELING AFRAID AS IF SOMETHING AWFUL MIGHT HAPPEN: MORE THAN HALF THE DAYS
2. NOT BEING ABLE TO STOP OR CONTROL WORRYING: NEARLY EVERY DAY
8. IF YOU CHECKED OFF ANY PROBLEMS, HOW DIFFICULT HAVE THESE MADE IT FOR YOU TO DO YOUR WORK, TAKE CARE OF THINGS AT HOME, OR GET ALONG WITH OTHER PEOPLE?: EXTREMELY DIFFICULT
6. BECOMING EASILY ANNOYED OR IRRITABLE: MORE THAN HALF THE DAYS
1. FEELING NERVOUS, ANXIOUS, OR ON EDGE: NEARLY EVERY DAY

## 2022-08-23 ASSESSMENT — PATIENT HEALTH QUESTIONNAIRE - PHQ9
SUM OF ALL RESPONSES TO PHQ QUESTIONS 1-9: 10
10. IF YOU CHECKED OFF ANY PROBLEMS, HOW DIFFICULT HAVE THESE PROBLEMS MADE IT FOR YOU TO DO YOUR WORK, TAKE CARE OF THINGS AT HOME, OR GET ALONG WITH OTHER PEOPLE: EXTREMELY DIFFICULT
SUM OF ALL RESPONSES TO PHQ QUESTIONS 1-9: 10

## 2022-08-24 ASSESSMENT — PATIENT HEALTH QUESTIONNAIRE - PHQ9: SUM OF ALL RESPONSES TO PHQ QUESTIONS 1-9: 10

## 2022-08-24 ASSESSMENT — ANXIETY QUESTIONNAIRES: GAD7 TOTAL SCORE: 17

## 2022-09-28 ENCOUNTER — TRANSFERRED RECORDS (OUTPATIENT)
Dept: HEALTH INFORMATION MANAGEMENT | Facility: CLINIC | Age: 72
End: 2022-09-28

## 2022-10-01 ENCOUNTER — HEALTH MAINTENANCE LETTER (OUTPATIENT)
Age: 72
End: 2022-10-01

## 2022-10-07 ENCOUNTER — TRANSFERRED RECORDS (OUTPATIENT)
Dept: HEALTH INFORMATION MANAGEMENT | Facility: CLINIC | Age: 72
End: 2022-10-07

## 2022-10-17 ASSESSMENT — ENCOUNTER SYMPTOMS
HEADACHES: 0
FEVER: 0
DIZZINESS: 0
NERVOUS/ANXIOUS: 1
DYSURIA: 0
SORE THROAT: 0
JOINT SWELLING: 0
PARESTHESIAS: 0
NAUSEA: 0
HEMATURIA: 0
HEMATOCHEZIA: 0
ABDOMINAL PAIN: 0
ARTHRALGIAS: 1
MYALGIAS: 1
CONSTIPATION: 0
SHORTNESS OF BREATH: 0
EYE PAIN: 0
HEARTBURN: 0
FREQUENCY: 0
CHILLS: 0
BREAST MASS: 0
PALPITATIONS: 0
DIARRHEA: 0
COUGH: 0
WEAKNESS: 0

## 2022-10-17 ASSESSMENT — ACTIVITIES OF DAILY LIVING (ADL): CURRENT_FUNCTION: NO ASSISTANCE NEEDED

## 2022-10-20 ENCOUNTER — OFFICE VISIT (OUTPATIENT)
Dept: FAMILY MEDICINE | Facility: CLINIC | Age: 72
End: 2022-10-20
Payer: MEDICARE

## 2022-10-20 VITALS
DIASTOLIC BLOOD PRESSURE: 77 MMHG | BODY MASS INDEX: 30.92 KG/M2 | HEIGHT: 67 IN | SYSTOLIC BLOOD PRESSURE: 121 MMHG | HEART RATE: 62 BPM | WEIGHT: 197 LBS

## 2022-10-20 DIAGNOSIS — Z12.11 SCREEN FOR COLON CANCER: ICD-10-CM

## 2022-10-20 DIAGNOSIS — E78.5 HYPERLIPIDEMIA LDL GOAL <130: ICD-10-CM

## 2022-10-20 DIAGNOSIS — I10 BENIGN ESSENTIAL HYPERTENSION: ICD-10-CM

## 2022-10-20 DIAGNOSIS — M51.379 DEGENERATION OF LUMBAR OR LUMBOSACRAL INTERVERTEBRAL DISC: ICD-10-CM

## 2022-10-20 DIAGNOSIS — Z00.00 ENCOUNTER FOR MEDICARE ANNUAL WELLNESS EXAM: Primary | ICD-10-CM

## 2022-10-20 DIAGNOSIS — M48.061 SPINAL STENOSIS OF LUMBAR REGION, UNSPECIFIED WHETHER NEUROGENIC CLAUDICATION PRESENT: ICD-10-CM

## 2022-10-20 DIAGNOSIS — F41.9 ANXIETY: ICD-10-CM

## 2022-10-20 DIAGNOSIS — I10 ESSENTIAL HYPERTENSION, BENIGN: ICD-10-CM

## 2022-10-20 DIAGNOSIS — E55.9 VITAMIN D DEFICIENCY: ICD-10-CM

## 2022-10-20 DIAGNOSIS — R76.8 POSITIVE ANTINUCLEAR ANTIBODY: ICD-10-CM

## 2022-10-20 DIAGNOSIS — Z12.4 CERVICAL CANCER SCREENING: ICD-10-CM

## 2022-10-20 DIAGNOSIS — Z12.11 ENCOUNTER FOR SCREENING FOR MALIGNANT NEOPLASM OF COLON: ICD-10-CM

## 2022-10-20 LAB
ALBUMIN SERPL BCG-MCNC: 4.1 G/DL (ref 3.5–5.2)
ALP SERPL-CCNC: 135 U/L (ref 35–104)
ALT SERPL W P-5'-P-CCNC: 18 U/L (ref 10–35)
ANION GAP SERPL CALCULATED.3IONS-SCNC: 11 MMOL/L (ref 7–15)
AST SERPL W P-5'-P-CCNC: 21 U/L (ref 10–35)
BILIRUB SERPL-MCNC: 0.5 MG/DL
BUN SERPL-MCNC: 20.1 MG/DL (ref 8–23)
CALCIUM SERPL-MCNC: 9.6 MG/DL (ref 8.8–10.2)
CHLORIDE SERPL-SCNC: 103 MMOL/L (ref 98–107)
CHOLEST SERPL-MCNC: 174 MG/DL
CREAT SERPL-MCNC: 0.84 MG/DL (ref 0.51–0.95)
DEPRECATED HCO3 PLAS-SCNC: 27 MMOL/L (ref 22–29)
ERYTHROCYTE [DISTWIDTH] IN BLOOD BY AUTOMATED COUNT: 13.5 % (ref 10–15)
GFR SERPL CREATININE-BSD FRML MDRD: 74 ML/MIN/1.73M2
GLUCOSE SERPL-MCNC: 96 MG/DL (ref 70–99)
HCT VFR BLD AUTO: 40.6 % (ref 35–47)
HDLC SERPL-MCNC: 62 MG/DL
HGB BLD-MCNC: 13.5 G/DL (ref 11.7–15.7)
LDLC SERPL CALC-MCNC: 94 MG/DL
MCH RBC QN AUTO: 29.6 PG (ref 26.5–33)
MCHC RBC AUTO-ENTMCNC: 33.3 G/DL (ref 31.5–36.5)
MCV RBC AUTO: 89 FL (ref 78–100)
NONHDLC SERPL-MCNC: 112 MG/DL
PLATELET # BLD AUTO: 306 10E3/UL (ref 150–450)
POTASSIUM SERPL-SCNC: 3.8 MMOL/L (ref 3.4–5.3)
PROT SERPL-MCNC: 6.6 G/DL (ref 6.4–8.3)
RBC # BLD AUTO: 4.56 10E6/UL (ref 3.8–5.2)
SODIUM SERPL-SCNC: 141 MMOL/L (ref 136–145)
TRIGL SERPL-MCNC: 90 MG/DL
TSH SERPL DL<=0.005 MIU/L-ACNC: 3.6 UIU/ML (ref 0.3–4.2)
WBC # BLD AUTO: 6.4 10E3/UL (ref 4–11)

## 2022-10-20 PROCEDURE — 84443 ASSAY THYROID STIM HORMONE: CPT | Performed by: FAMILY MEDICINE

## 2022-10-20 PROCEDURE — 0124A COVID-19,PF,PFIZER BOOSTER BIVALENT: CPT | Performed by: FAMILY MEDICINE

## 2022-10-20 PROCEDURE — 36415 COLL VENOUS BLD VENIPUNCTURE: CPT | Performed by: FAMILY MEDICINE

## 2022-10-20 PROCEDURE — 85027 COMPLETE CBC AUTOMATED: CPT | Performed by: FAMILY MEDICINE

## 2022-10-20 PROCEDURE — 87624 HPV HI-RISK TYP POOLED RSLT: CPT | Performed by: FAMILY MEDICINE

## 2022-10-20 PROCEDURE — 82306 VITAMIN D 25 HYDROXY: CPT | Performed by: FAMILY MEDICINE

## 2022-10-20 PROCEDURE — 80061 LIPID PANEL: CPT | Performed by: FAMILY MEDICINE

## 2022-10-20 PROCEDURE — 99214 OFFICE O/P EST MOD 30 MIN: CPT | Mod: 25 | Performed by: FAMILY MEDICINE

## 2022-10-20 PROCEDURE — G0145 SCR C/V CYTO,THINLAYER,RESCR: HCPCS | Performed by: FAMILY MEDICINE

## 2022-10-20 PROCEDURE — G0439 PPPS, SUBSEQ VISIT: HCPCS | Performed by: FAMILY MEDICINE

## 2022-10-20 PROCEDURE — 80053 COMPREHEN METABOLIC PANEL: CPT | Performed by: FAMILY MEDICINE

## 2022-10-20 PROCEDURE — G0008 ADMIN INFLUENZA VIRUS VAC: HCPCS | Mod: 59 | Performed by: FAMILY MEDICINE

## 2022-10-20 PROCEDURE — 91312 COVID-19,PF,PFIZER BOOSTER BIVALENT: CPT | Performed by: FAMILY MEDICINE

## 2022-10-20 PROCEDURE — 90662 IIV NO PRSV INCREASED AG IM: CPT | Performed by: FAMILY MEDICINE

## 2022-10-20 RX ORDER — ATORVASTATIN CALCIUM 20 MG/1
TABLET, FILM COATED ORAL
Qty: 135 TABLET | Refills: 3 | Status: SHIPPED | OUTPATIENT
Start: 2022-10-20 | End: 2023-11-06

## 2022-10-20 RX ORDER — FLUOCINONIDE TOPICAL SOLUTION USP, 0.05% 0.5 MG/ML
SOLUTION TOPICAL
COMMUNITY
Start: 2022-09-28

## 2022-10-20 RX ORDER — HYDROCHLOROTHIAZIDE 50 MG/1
50 TABLET ORAL DAILY
Qty: 90 TABLET | Refills: 3 | Status: SHIPPED | OUTPATIENT
Start: 2022-10-20 | End: 2022-12-05

## 2022-10-20 RX ORDER — ESCITALOPRAM OXALATE 10 MG/1
5 TABLET ORAL DAILY
Qty: 90 TABLET | Refills: 3
Start: 2022-10-20 | End: 2023-10-25

## 2022-10-20 RX ORDER — METOPROLOL SUCCINATE 25 MG/1
25 TABLET, EXTENDED RELEASE ORAL DAILY
Qty: 90 TABLET | Refills: 3 | Status: SHIPPED | OUTPATIENT
Start: 2022-10-20 | End: 2022-11-27

## 2022-10-20 RX ORDER — POTASSIUM CHLORIDE 1500 MG/1
TABLET, EXTENDED RELEASE ORAL
Qty: 90 TABLET | Refills: 3 | Status: SHIPPED | OUTPATIENT
Start: 2022-10-20 | End: 2023-11-06

## 2022-10-20 ASSESSMENT — ENCOUNTER SYMPTOMS
FEVER: 0
HEARTBURN: 0
CHILLS: 0
DIARRHEA: 0
FREQUENCY: 0
NERVOUS/ANXIOUS: 1
SHORTNESS OF BREATH: 0
MYALGIAS: 1
PARESTHESIAS: 0
HEMATURIA: 0
EYE PAIN: 0
CONSTIPATION: 0
NAUSEA: 0
HEADACHES: 0
JOINT SWELLING: 0
DIZZINESS: 0
SORE THROAT: 0
PALPITATIONS: 0
BREAST MASS: 0
ARTHRALGIAS: 1
DYSURIA: 0
ABDOMINAL PAIN: 0
WEAKNESS: 0
HEMATOCHEZIA: 0
COUGH: 0

## 2022-10-20 ASSESSMENT — ACTIVITIES OF DAILY LIVING (ADL): CURRENT_FUNCTION: NO ASSISTANCE NEEDED

## 2022-10-20 NOTE — PROGRESS NOTES
"SUBJECTIVE:   Migdalia is a 72 year old who presents for Preventive Visit.    Patient has been advised of split billing requirements and indicates understanding: Yes     Are you in the first 12 months of your Medicare coverage?  No    Healthy Habits:     In general, how would you rate your overall health?  Good    Frequency of exercise:  4-5 days/week    Duration of exercise:  30-45 minutes    Do you usually eat at least 4 servings of fruit and vegetables a day, include whole grains    & fiber and avoid regularly eating high fat or \"junk\" foods?  Yes    Taking medications regularly:  Yes    Medication side effects:  None    Ability to successfully perform activities of daily living:  No assistance needed    Home Safety:  No safety concerns identified    Hearing Impairment:  No hearing concerns    In the past 6 months, have you been bothered by leaking of urine?  No    In general, how would you rate your overall mental or emotional health?  Fair      PHQ-2 Total Score: 0    Additional concerns today:  No    IT band:  On left acted up in 2017 and recent;y. Saw [facility] and had cortisone and feels brand new.    Back:  Much better.  Still feels unsteady on her feet.     Do you feel safe in your environment? Yes    Have you ever done Advance Care Planning? (For example, a Health Directive, POLST, or a discussion with a medical provider or your loved ones about your wishes): Yes, patient states has an Advance Care Planning document and will bring a copy to the clinic.       Fall risk  Fallen 2 or more times in the past year?: Yes  Any fall with injury in the past year?: No    Cognitive Screening   1) Repeat 3 items (Leader, Season, Table)    2) Clock draw: NORMAL  3) 3 item recall: Recalls 3 objects  Results: 3 items recalled: COGNITIVE IMPAIRMENT LESS LIKELY    Mini-CogTM Copyright S Dom. Licensed by the author for use in [facility]; reprinted with permission (james@.[facility]). All rights reserved.      Reviewed " and updated as needed this visit by clinical staff   Tobacco  Allergies  Meds              Reviewed and updated as needed this visit by Provider                 Social History     Tobacco Use     Smoking status: Former     Types: Cigarettes     Smokeless tobacco: Never   Substance Use Topics     Alcohol use: Yes     Alcohol/week: 1.0 standard drink     Comment: Alcoholic Drinks/day: occasionally     If you drink alcohol do you typically have >3 drinks per day or >7 drinks per week? No    Alcohol Use 10/17/2022   Prescreen: >3 drinks/day or >7 drinks/week? No   Prescreen: >3 drinks/day or >7 drinks/week? -               Current providers sharing in care for this patient include:   Patient Care Team:  Anahi Aguilar MD as PCP - General (Family Medicine)  Anahi Aguilar MD as Assigned PCP    The following health maintenance items are reviewed in Epic and correct as of today:  Health Maintenance   Topic Date Due     DEXA  Declined     HEPATITIS B IMMUNIZATION (1 of 3 - 3-dose series) Declined     ZOSTER IMMUNIZATION (1 of 2) If you are interested in the new shingles shot, Shingrix, please check with insurance for coverage either in clinic or at a pharmacy. It is a 2 shot series 2-6 months apart.      LUNG CANCER SCREENING  Quit 20 years ago     COLORECTAL CANCER SCREENING  Please set your colonoscopy     COVID-19 Vaccine (4 - Booster for Holly series) Today     INFLUENZA VACCINE (1) Today     PAP FOLLOW-UP  Today     HPV FOLLOW-UP  Today     MEDICARE ANNUAL WELLNESS VISIT  Today     ANNUAL REVIEW OF HM ORDERS  Today     FALL RISK ASSESSMENT  Today     MAMMO SCREENING  07/25/2023     DTAP/TDAP/TD IMMUNIZATION (2 - Td or Tdap) 08/24/2025     ADVANCE CARE PLANNING  Packet given     LIPID  Today     HEPATITIS C SCREENING  Completed     PHQ-2 (once per calendar year)  Completed     Pneumococcal Vaccine: 65+ Years  Completed     IPV IMMUNIZATION  Aged Out     MENINGITIS IMMUNIZATION  Aged Out         Review  "of Systems   Constitutional: Negative for chills and fever.   HENT: Negative for congestion, ear pain, hearing loss and sore throat.    Eyes: Negative for pain and visual disturbance.   Respiratory: Negative for cough and shortness of breath.    Cardiovascular: Negative for chest pain, palpitations and peripheral edema.   Gastrointestinal: Negative for abdominal pain, constipation, diarrhea, heartburn, hematochezia and nausea.   Breasts:  Negative for tenderness, breast mass and discharge.   Genitourinary: Negative for dysuria, frequency, genital sores, hematuria, pelvic pain, urgency, vaginal bleeding and vaginal discharge.   Musculoskeletal: Positive for arthralgias and myalgias. Negative for joint swelling.   Skin: Negative for rash.   Neurological: Negative for dizziness, weakness, headaches and paresthesias.   Psychiatric/Behavioral: Negative for mood changes. The patient is nervous/anxious.          OBJECTIVE:   BP (!) 140/80 (BP Location: Left arm, Patient Position: Sitting, Cuff Size: Adult Regular)   Pulse 63   Ht 1.695 m (5' 6.75\")   Wt 89.4 kg (197 lb)   BMI 31.09 kg/m   Estimated body mass index is 31.09 kg/m  as calculated from the following:    Height as of this encounter: 1.695 m (5' 6.75\").    Weight as of this encounter: 89.4 kg (197 lb).  Physical Exam  GENERAL APPEARANCE: healthy, alert and no distress  EYES: Eyes grossly normal to inspection, PERRL and conjunctivae and sclerae normal  HENT: ear canals and TM's normal, nose and mouth without ulcers or lesions, oropharynx clear and oral mucous membranes moist  NECK: no adenopathy, no asymmetry, masses, or scars and thyroid normal to palpation  RESP: lungs clear to auscultation - no rales, rhonchi or wheezes  BREAST: normal without masses, tenderness or nipple discharge and no palpable axillary masses or adenopathy  CV: regular rate and rhythm, normal S1 S2, no S3 or S4, no murmur, click or rub, no peripheral edema and peripheral pulses " strong  ABDOMEN: soft, nontender, no hepatosplenomegaly, no masses and bowel sounds normal  MS: no musculoskeletal defects are noted and gait is age appropriate without ataxia  SKIN: no suspicious lesions or rashes  NEURO: Normal strength and tone, sensory exam grossly normal, mentation intact and speech normal  PSYCH: mentation appears normal and affect normal/bright    Diagnostic Test Results:  Labs reviewed in Epic    ASSESSMENT / PLAN:       ICD-10-CM    1. Encounter for Medicare annual wellness exam  Z00.00       2. Essential hypertension, benign  I10 potassium chloride ER (KLOR-CON M) 20 MEQ CR tablet     metoprolol succinate ER (TOPROL XL) 25 MG 24 hr tablet     Comprehensive metabolic panel (BMP + Alb, Alk Phos, ALT, AST, Total. Bili, TP)     CBC with platelets     TSH with free T4 reflex     Comprehensive metabolic panel (BMP + Alb, Alk Phos, ALT, AST, Total. Bili, TP)     CBC with platelets     TSH with free T4 reflex      3. Benign essential hypertension  I10 hydrochlorothiazide (HYDRODIURIL) 50 MG tablet      4. Hyperlipidemia LDL goal <130  E78.5 atorvastatin (LIPITOR) 20 MG tablet     Lipid panel reflex to direct LDL Fasting     Lipid panel reflex to direct LDL Fasting      5. Vitamin D deficiency  E55.9 Vitamin D Deficiency     Vitamin D Deficiency      6. Screen for colon cancer  Z12.11       7. Encounter for screening for malignant neoplasm of colon  Z12.11       8. Cervical cancer screening  Z12.4 Pap Screen with HPV - recommended age 30 - 65 years      9. Lumbar Disc Degeneration  M51.37       10. Spinal stenosis of lumbar region, unspecified whether neurogenic claudication present  M48.061       11. Positive antinuclear antibody  R76.8       12. Anxiety  F41.9 escitalopram (LEXAPRO) 10 MG tablet        I am glad you use your cane.    Following with [facility] for lumbar disc degeneration and spinal stenosis.    I am glad you are seeing the Rheumatology for the positive antinuclear antibody to check  "for an autoimmune arthritis.    Good that you are on the Lexapro 5 mg daily.    I am glad you see Dermatology for full body skin checks.    Continue same meds for blood pressure.    Patient has been advised of split billing requirements and indicates understanding: Yes      COUNSELING:  Reviewed preventive health counseling, as reflected in patient instructions       Regular exercise       Healthy diet/nutrition    Estimated body mass index is 31.09 kg/m  as calculated from the following:    Height as of this encounter: 1.695 m (5' 6.75\").    Weight as of this encounter: 89.4 kg (197 lb).    Weight management plan: Discussed healthy diet and exercise guidelines    She reports that she has quit smoking. Her smoking use included cigarettes. She has never used smokeless tobacco.      Appropriate preventive services were discussed with this patient, including applicable screening as appropriate for cardiovascular disease, diabetes, osteopenia/osteoporosis, and glaucoma.  As appropriate for age/gender, discussed screening for colorectal cancer, prostate cancer, breast cancer, and cervical cancer. Checklist reviewing preventive services available has been given to the patient.    Reviewed patients plan of care and provided an AVS. The Basic Care Plan (routine screening as documented in Health Maintenance) for Migdalia meets the Care Plan requirement. This Care Plan has been established and reviewed with the Patient.    Counseling Resources:  ATP IV Guidelines  Pooled Cohorts Equation Calculator  Breast Cancer Risk Calculator  Breast Cancer: Medication to Reduce Risk  FRAX Risk Assessment  ICSI Preventive Guidelines  Dietary Guidelines for Americans, 2010  USDA's MyPlate  ASA Prophylaxis  Lung CA Screening    Anahi Aguilar MD  [facility]    Identified Health Risks:  "

## 2022-10-20 NOTE — PATIENT INSTRUCTIONS
I am glad you use your cane.    Following with [facility] for lumbar disc degeneration and spinal stenosis.    I am glad you are seeing the Rheumatology for the positive antinuclear antibody to check for an autoimmune arthritis.    Good that you are on the Lexapro 5 mg daily.    I am glad you see Dermatology for full body skin checks.    Continue same meds for blood pressure.        At your visit today, we discussed your risk for falls and preventive options.    Fall Prevention  Falls often occur due to slipping, tripping or losing your balance. Millions of people fall every year and injure themselves. Here are ways to reduce your risk of falling again.   Think about your fall, was there anything that caused your fall that can be fixed, removed, or replaced?  Make your home safe by keeping walkways clear of objects you may trip over, such as electric cords.  Use non-slip pads under rugs. Don't use area rugs or small throw rugs.  Use non-slip mats in bathtubs and showers.  Install handrails and lights on staircases. The handrails should be on both sides of the stairs.  Don't walk in poorly lit areas.  Don't stand on chairs or wobbly ladders.  Use caution when reaching overhead or looking upward. This position can cause a loss of balance.  Be sure your shoes fit properly, have non-slip bottoms and are in good condition.   Wear shoes both inside and out. Don't go barefoot or wear slippers.  Be cautious when going up and down stairs, curbs, and when walking on uneven sidewalks.  If your balance is poor, consider using a cane or walker.  If your fall was related to alcohol use, stop or limit alcohol intake.   If your fall was related to use of sleeping medicines, talk to your healthcare provider about this. You may need to reduce your dosage at bedtime if you awaken during the night to go to the bathroom.    To reduce the need for nighttime bathroom trips:  Don't drink fluids for several hours before going to bed  Empty your  bladder before going to bed  Men can keep a urinal at the bedside  Stay as active as you can. Balance, flexibility, strength, and endurance all come from exercise. They all play a role in preventing falls. Ask your healthcare provider which types of activity are right for you.  Get your vision checked on a regular basis.  If you have pets, know where they are before you stand up or walk so you don't trip over them.  Use night lights.  Go over all your medicines with a pharmacist or other healthcare provider to see if any of them could make you more likely to fall.  Papriika last reviewed this educational content on 4/1/2018 2000-2021 [employer]. All rights reserved. This information is not intended as a substitute for professional medical care. Always follow your healthcare professional's instructions.        Patient Education   Personalized Prevention Plan  You are due for the preventive services outlined below.  Your care team is available to assist you in scheduling these services.  If you have already completed any of these items, please share that information with your care team to update in your medical record.    Your Health Risk Assessment indicates you feel you are not in good emotional health.    Recreation   Recreation is not limited to sports and team events. It includes any activity that provides relaxation, interest, enjoyment, and exercise. Recreation provides an outlet for physical, mental, and social energy. It can give a sense of worth and achievement. It can help you stay healthy.    Mental Exercise and Social Involvement  Mental and emotional health is as important as physical health. Keep in touch with friends and family. Stay as active as possible. Continue to learn and challenge yourself.   Things you can do to stay mentally active are:  Learn something new, like a foreign language or musical instrument.   Play SCRABBLE or do crossword puzzles. If you cannot find people to  play these games with you at home, you can play them with others on your computer through the Internet.   Join a games club--anything from card games to chess or checkers or lawn bowling.   Start a new hobby.   Go back to school.   Volunteer.   Read.   Keep up with world events.    Preventing Falls at Home  A person can fall for many reasons. Older adults may fall because reaction time slows down as we age. Your muscles and joints may get stiff, weak, or less flexible because of illness, medicines, or a physical condition.   Other health problems that make falls more likely include:   Arthritis  Dizziness or lightheadedness when you stand up (orthostatic hypotension)  History of a stroke  Dizziness  Anemia  Certain medicines taken for mental illness or to control blood pressure.  Problems with balance or gait  Bladder or urinary problems  History of falling  Changes in vision (vision impairment)  Changes in thinking skills and memory (cognitive impairment)  Injuries from a fall can include serious injuries such as broken bones, dislocated joints, internal bleeding and cuts. Injuries like these can limit your independence.   Prevention tips  To help prevent falls and fall-related injuries, follow the tips below.    Floors  To make floors safer:   Put nonskid pads under area rugs.  Remove small rugs.  Replace worn floor coverings.  Tack carpets firmly to each step on carpeted stairs. Put nonskid strips on the edges of uncarpeted stairs.  Keep floors and stairs free of clutter and cords.  Arrange furniture so there are clear pathways.  Clean up any spills right away.  Bathrooms    To make bathrooms safer:   Install grab bars in the tub or shower.  Apply nonskid strips or put a nonskid rubber mat in the tub or shower.  Sit on a bath chair to bathe.  Use bathmats with nonskid backing.  Lighting  To improve visibility in your home:    Keep a flashlight in each room. Or put a lamp next to the bed within easy reach.  Put  nightlights in the bedrooms, hallways, kitchen, and bathrooms.  Make sure all stairways have good lighting.  Take your time when going up and down stairs.  Put handrails on both sides of stairs and in walkways for more support. To prevent injury to your wrist or arm, don t use handrails to pull yourself up.  Install grab bars to pull yourself up.  Move or rearrange items that you use often. This will make them easier to find or reach.  Look at your home to find any safety hazards. Especially look at doorways, walkways, and the driveway. Remove or repair any safety problems that you find.  Other changes to make  Look around to find any safety hazards. Look closely at doorways, walkways, and the driveway. Remove or repair any safety problems that you find.  Wear shoes that fit well.  Take your time when going up and down stairs.  Put handrails on both sides of stairs and in walkways for more support. To prevent injury to your wrist or arm, don t use handrails to pull yourself up.  Install grab bars wherever needed to pull yourself up.  Arrange items that you use often. This will make them easier to find or reach.    [email or web address] last reviewed this educational content on 3/1/2020    2785-6371 [employer]. All rights reserved. This information is not intended as a substitute for professional medical care. Always follow your healthcare professional's instructions.          Health Maintenance   Topic Date Due    DEXA  Declined    HEPATITIS B IMMUNIZATION (1 of 3 - 3-dose series) Declined    ZOSTER IMMUNIZATION (1 of 2) If you are interested in the new shingles shot, Shingrix, please check with insurance for coverage either in clinic or at a pharmacy. It is a 2 shot series 2-6 months apart.     LUNG CANCER SCREENING  Quit 20 years ago    COLORECTAL CANCER SCREENING  Please set your colonoscopy    COVID-19 Vaccine (4 - Booster for Holly series) Today    INFLUENZA VACCINE (1) Today    PAP FOLLOW-UP  Today    HPV  FOLLOW-UP  Today    MEDICARE ANNUAL WELLNESS VISIT  Today    ANNUAL REVIEW OF HM ORDERS  Today    FALL RISK ASSESSMENT  Today    MAMMO SCREENING  07/25/2023    DTAP/TDAP/TD IMMUNIZATION (2 - Td or Tdap) 08/24/2025    ADVANCE CARE PLANNING  Packet given    LIPID  Today    HEPATITIS C SCREENING  Completed    PHQ-2 (once per calendar year)  Completed    Pneumococcal Vaccine: 65+ Years  Completed    IPV IMMUNIZATION  Aged Out    MENINGITIS IMMUNIZATION  Aged Out

## 2022-10-20 NOTE — PROGRESS NOTES
The patient was provided with suggestions to help her develop a healthy emotional lifestyle.  She is at risk for falling and has been provided with information to reduce the risk of falling at home.

## 2022-10-21 LAB — DEPRECATED CALCIDIOL+CALCIFEROL SERPL-MC: 44 UG/L (ref 20–75)

## 2022-10-25 LAB
BKR LAB AP GYN ADEQUACY: NORMAL
BKR LAB AP GYN INTERPRETATION: NORMAL
BKR LAB AP HPV REFLEX: NORMAL
BKR LAB AP PREVIOUS ABNORMAL: NORMAL
PATH REPORT.COMMENTS IMP SPEC: NORMAL
PATH REPORT.COMMENTS IMP SPEC: NORMAL
PATH REPORT.RELEVANT HX SPEC: NORMAL

## 2022-10-27 LAB
HUMAN PAPILLOMA VIRUS 16 DNA: NEGATIVE
HUMAN PAPILLOMA VIRUS 18 DNA: NEGATIVE
HUMAN PAPILLOMA VIRUS FINAL DIAGNOSIS: NORMAL
HUMAN PAPILLOMA VIRUS OTHER HR: NEGATIVE

## 2022-11-25 ENCOUNTER — MYC MEDICAL ADVICE (OUTPATIENT)
Dept: FAMILY MEDICINE | Facility: CLINIC | Age: 72
End: 2022-11-25

## 2022-12-05 ENCOUNTER — E-VISIT (OUTPATIENT)
Dept: FAMILY MEDICINE | Facility: CLINIC | Age: 72
End: 2022-12-05
Payer: MEDICARE

## 2022-12-05 DIAGNOSIS — I95.1 ORTHOSTATIC HYPOTENSION: Primary | ICD-10-CM

## 2022-12-05 PROCEDURE — 99421 OL DIG E/M SVC 5-10 MIN: CPT | Performed by: FAMILY MEDICINE

## 2022-12-07 ENCOUNTER — ALLIED HEALTH/NURSE VISIT (OUTPATIENT)
Dept: FAMILY MEDICINE | Facility: CLINIC | Age: 72
End: 2022-12-07
Payer: MEDICARE

## 2022-12-07 VITALS — DIASTOLIC BLOOD PRESSURE: 76 MMHG | SYSTOLIC BLOOD PRESSURE: 111 MMHG | HEART RATE: 93 BPM

## 2022-12-07 DIAGNOSIS — Z01.30 BLOOD PRESSURE CHECK: Primary | ICD-10-CM

## 2022-12-07 PROCEDURE — 99207 PR NO CHARGE NURSE ONLY: CPT

## 2022-12-07 NOTE — PROGRESS NOTES
Follow Up Blood Pressure Check    Migdalia Coronado is a 72 year old female recommended to follow up for blood pressure check by Anahi Aguilarihypertensive medications and adherence were verified: Yes.     Reason for visit: Pt has been having higher blood pressure's at home and not feeling quite right     Medication change at last visit: Currently not on blood pressure medications     Today's Vitals:   Vitals:    12/07/22 1103 12/07/22 1112 12/07/22 1113   BP: (!) 152/87 118/77 111/76   BP Location: Right arm Right arm Left arm   Patient Position: Sitting Sitting Standing   Cuff Size: Adult Large Adult Large Adult Large   Pulse: 88 83 93       Home blood pressure readings brought in today:   Pt has been taking it at home- running around the same as above- all over the place    Lowest blood pressure today is less than 140/90 and they deny signs or symptoms     Shavon Rossi MA    Current Outpatient Medications   Medication Sig Dispense Refill     acetaminophen (TYLENOL) 500 MG tablet Take 1,000 mg by mouth every 6 hours as needed for mild pain (Patient not taking: Reported on 10/20/2022)       aspirin 81 MG EC tablet [ASPIRIN 81 MG EC TABLET] Take 81 mg by mouth daily.       atorvastatin (LIPITOR) 20 MG tablet [ATORVASTATIN (LIPITOR) 20 MG TABLET] TAKE 1 AND 1/2 TABLETS BY MOUTH EVERY  tablet 3     calcium-vitamin D 500-125 MG-UNIT TABS Take 1 tablet by mouth 2 times daily       celecoxib (CELEBREX) 100 MG capsule Take 1 capsule (100 mg) by mouth 2 times daily (Patient not taking: Reported on 8/10/2022) 180 capsule 3     escitalopram (LEXAPRO) 10 MG tablet Take 0.5 tablets (5 mg) by mouth daily 90 tablet 3     fluocinonide (LIDEX) 0.05 % external solution        fluticasone (FLONASE) 50 MCG/ACT nasal spray SHAKE LIQUID AND USE 2 SPRAYS IN EACH NOSTRIL DAILY AS NEEDED (Patient not taking: Reported on 10/20/2022) 48 g 3     gabapentin (NEURONTIN) 300 MG capsule Take 1 capsule (300 mg) by mouth 3 times  daily 270 capsule 3     ibuprofen (ADVIL/MOTRIN) 800 MG tablet TAKE 1 TABLET(800 MG) BY MOUTH THREE TIMES DAILY AS NEEDED 90 tablet 3     potassium chloride ER (KLOR-CON M) 20 MEQ CR tablet TAKE 1 TABLET(20 MEQ) BY MOUTH DAILY 90 tablet 3

## 2022-12-08 NOTE — PROGRESS NOTES
Please call patient.  I am glad she is off her blood pressure medications as it is dropping significantly when she stands.  Please have her set up the cardiology appointment soon.  Thank you.

## 2022-12-09 ENCOUNTER — TELEPHONE (OUTPATIENT)
Dept: FAMILY MEDICINE | Facility: CLINIC | Age: 72
End: 2022-12-09

## 2022-12-09 NOTE — TELEPHONE ENCOUNTER
LMTCB, Please see message below from provider.      Anahi Aguilar MD at 12/7/2022 11:00 AM    Status: Signed   Please call patient.  I am glad she is off her blood pressure medications as it is dropping significantly when she stands.  Please have her set up the cardiology appointment soon.  Thank you.

## 2022-12-12 NOTE — TELEPHONE ENCOUNTER
Patient called back for her message from Yarely. She stated she understands and is going to stay off her medication till she sees the cardiologist in February. This was the soonest opening. She told me she will call us back if she has an issue with her BP in the meantime, and she is taking her Blood pressure everyday.

## 2022-12-19 ENCOUNTER — TRANSFERRED RECORDS (OUTPATIENT)
Dept: HEALTH INFORMATION MANAGEMENT | Facility: CLINIC | Age: 72
End: 2022-12-19

## 2022-12-21 DIAGNOSIS — L98.9 SKIN LESION: Primary | ICD-10-CM

## 2023-02-06 ENCOUNTER — OFFICE VISIT (OUTPATIENT)
Dept: CARDIOLOGY | Facility: CLINIC | Age: 73
End: 2023-02-06
Payer: MEDICARE

## 2023-02-06 VITALS
BODY MASS INDEX: 30.45 KG/M2 | RESPIRATION RATE: 16 BRPM | HEART RATE: 75 BPM | HEIGHT: 67 IN | SYSTOLIC BLOOD PRESSURE: 150 MMHG | DIASTOLIC BLOOD PRESSURE: 90 MMHG | WEIGHT: 194 LBS

## 2023-02-06 DIAGNOSIS — I10 ESSENTIAL HYPERTENSION, BENIGN: Primary | ICD-10-CM

## 2023-02-06 PROCEDURE — 99203 OFFICE O/P NEW LOW 30 MIN: CPT | Performed by: INTERNAL MEDICINE

## 2023-02-06 NOTE — PATIENT INSTRUCTIONS
Resume metoprolol succinate 12.5 mg/day  Resume hydrochlorothiazide 12.5 mg/day   Continue serial BP monitoring at home  Follow up 4 months

## 2023-02-06 NOTE — LETTER
"2/6/2023    Anahi Aguilar MD  [address]  [facility] [address]    RE: Migdalia Coronado       Dear Colleague,     I had the pleasure of seeing Migdalia Coronado in the [facility].    HEART CARE ENCOUNTER NOTE      [facility]  [phone]      Assessment/Recommendations   Assessment:   1.  Patient with a history of essential hypertension on beta-blocker and diuretic therapy who has had intermittent symptoms of orthostatic lightheadedness.  I suspect this is mostly due to medication effect and would expected to improve with reduction of the dose of her antihypertensive regimen.      Plan:   1.  Resume metoprolol succinate at a lower dose of 12.5 mg/day.  2.  Resume hydrodiuril at a lower dose of 12.5 mg/day.  3. 12 lead ECG 1-2 weeks  4. We will arrange a f/u visit in 4 months           History of Present Illness/Subjective    HPI: Migdalia Coronado is a 72 year old female with a history of lightheaded spells.  These occur primarily when she stands up from sitting or or sits up after she has been lying supine.  She is not aware of any sensation of tachypalpitations and has had no overt syncope or presyncope.  She stopped taking her blood pressure medicines last week to see if this would help.  The lightheadedness has improved.    Studies reviewed:  ECG:ordered         Physical Examination  Review of Systems   Vitals: BP (!) 150/90 (BP Location: Right arm, Patient Position: Sitting, Cuff Size: Adult Large)   Pulse 75   Resp 16   Ht 1.695 m (5' 6.75\")   Wt 88 kg (194 lb)   BMI 30.61 kg/m    BMI= Body mass index is 30.61 kg/m .  Wt Readings from Last 3 Encounters:   02/06/23 88 kg (194 lb)   10/20/22 89.4 kg (197 lb)   08/10/22 92.6 kg (204 lb 2 oz)     Pleasant female no acute distress but anxious.  Blood pressure 150/90.  Lungs are clear  CV: Regular rate and rhythm no significant murmur.     Please refer above for cardiac ROS details.        Medical History  Surgical " History Family History Social History   No past medical history on file.  Past Surgical History:   Procedure Laterality Date     BIOPSY BREAST       CHOLECYSTECTOMY  2008     ZZC APPENDECTOMY       ZZC LIGATE FALLOPIAN TUBE       Family History   Problem Relation Age of Onset     Stomach Cancer Mother      Lung Cancer Father      Multiple myeloma Brother      Colon Polyps Son      Colon Cancer Nephew      Cerebrovascular Disease Maternal Grandmother      Breast Cancer No family hx of         Social History     Socioeconomic History     Marital status:      Spouse name: Not on file     Number of children: Not on file     Years of education: Not on file     Highest education level: Not on file   Occupational History     Not on file   Tobacco Use     Smoking status: Former     Types: Cigarettes     Smokeless tobacco: Never   Substance and Sexual Activity     Alcohol use: Yes     Alcohol/week: 1.0 standard drink     Comment: Alcoholic Drinks/day: occasionally     Drug use: No     Sexual activity: Not Currently     Partners: Male   Other Topics Concern     Not on file   Social History Narrative    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    Kristina Marquez MD  5/18/2018         Social Determinants of Health     Financial Resource Strain: Not on file   Food Insecurity: Not on file   Transportation Needs: Not on file   Physical Activity: Not on file   Stress: Not on file   Social Connections: Not on file   Intimate Partner Violence: Not on file   Housing Stability: Not on file           Medications  Allergies   Current Outpatient Medications   Medication Sig Dispense Refill     aspirin 81 MG EC tablet [ASPIRIN 81 MG EC TABLET] Take 81 mg by mouth daily.       atorvastatin (LIPITOR) 20 MG tablet [ATORVASTATIN (LIPITOR) 20 MG TABLET] TAKE 1 AND 1/2 TABLETS BY MOUTH EVERY  tablet 3     calcium-vitamin D 500-125 MG-UNIT TABS Take 1 tablet by mouth 2 times daily       escitalopram (LEXAPRO) 10 MG  tablet Take 0.5 tablets (5 mg) by mouth daily 90 tablet 3     fluocinonide (LIDEX) 0.05 % external solution        fluticasone (FLONASE) 50 MCG/ACT nasal spray SHAKE LIQUID AND USE 2 SPRAYS IN EACH NOSTRIL DAILY AS NEEDED 48 g 3     gabapentin (NEURONTIN) 300 MG capsule Take 1 capsule (300 mg) by mouth 3 times daily 270 capsule 3     potassium chloride ER (KLOR-CON M) 20 MEQ CR tablet TAKE 1 TABLET(20 MEQ) BY MOUTH DAILY 90 tablet 3     acetaminophen (TYLENOL) 500 MG tablet Take 1,000 mg by mouth every 6 hours as needed for mild pain (Patient not taking: Reported on 2/6/2023)       celecoxib (CELEBREX) 100 MG capsule Take 1 capsule (100 mg) by mouth 2 times daily (Patient not taking: Reported on 2/6/2023) 180 capsule 3     hydrochlorothiazide (HYDRODIURIL) 12.5 MG tablet Take 1 tablet (12.5 mg) by mouth daily 90 tablet 1     ibuprofen (ADVIL/MOTRIN) 800 MG tablet TAKE 1 TABLET(800 MG) BY MOUTH THREE TIMES DAILY AS NEEDED (Patient not taking: Reported on 2/6/2023) 90 tablet 3     metoprolol succinate ER (TOPROL XL) 25 MG 24 hr tablet Take 0.5 tablets (12.5 mg) by mouth daily 45 tablet 1       Allergies   Allergen Reactions     Ciprofloxacin Hives     Diatrizoate Meglumine [Diatrizoate] Nausea and Vomiting     Linzess [Linaclotide] Unknown     Stomach pain     Macrolide Antibiotics [Macrolides] Hives     Metoprolol      Orthostatic hypotension     Penicillins Hives          Lab Results    Chemistry/lipid CBC Cardiac Enzymes/BNP/TSH/INR   Recent Labs   Lab Test 10/20/22  1153   CHOL 174   HDL 62   LDL 94   TRIG 90     Recent Labs   Lab Test 10/20/22  1153 04/18/22  1022 10/18/21  1116   LDL 94 87 88     Recent Labs   Lab Test 10/20/22  1153      POTASSIUM 3.8   CHLORIDE 103   CO2 27   GLC 96   BUN 20.1   CR 0.84   GFRESTIMATED 74   BARON 9.6     Recent Labs   Lab Test 10/20/22  1153 04/18/22  1022 10/18/21  1116   CR 0.84 0.82 0.98     No results for input(s): A1C in the last 37839 hours.       Recent Labs   Lab  Test 10/20/22  1153   WBC 6.4   HGB 13.5   HCT 40.6   MCV 89        Recent Labs   Lab Test 10/20/22  1153 08/10/22  1628 10/18/21  1116   HGB 13.5 12.6 14.4    No results for input(s): TROPONINI in the last 85609 hours.  No results for input(s): BNP, NTBNPI, NTBNP in the last 51506 hours.  Recent Labs   Lab Test 10/20/22  1153   TSH 3.60     No results for input(s): INR in the last 40206 hours.     Jaylen Gale MD    Thank you for allowing me to participate in the care of your patient.      Sincerely,     Jaylen Gale MD     [facility]  cc:   Jaylen Gale MD  [address] [facility] [address]  [place] [address]

## 2023-02-08 DIAGNOSIS — I10 BENIGN ESSENTIAL HYPERTENSION: ICD-10-CM

## 2023-02-08 DIAGNOSIS — I10 ESSENTIAL HYPERTENSION, BENIGN: ICD-10-CM

## 2023-02-09 ENCOUNTER — TELEPHONE (OUTPATIENT)
Dept: CARDIOLOGY | Facility: CLINIC | Age: 73
End: 2023-02-09
Payer: MEDICARE

## 2023-02-09 DIAGNOSIS — I10 ESSENTIAL HYPERTENSION, BENIGN: Primary | ICD-10-CM

## 2023-02-09 RX ORDER — METOPROLOL SUCCINATE 25 MG/1
12.5 TABLET, EXTENDED RELEASE ORAL DAILY
Qty: 45 TABLET | Refills: 1 | Status: SHIPPED | OUTPATIENT
Start: 2023-02-09 | End: 2023-11-07

## 2023-02-09 RX ORDER — HYDROCHLOROTHIAZIDE 12.5 MG/1
12.5 TABLET ORAL DAILY
Qty: 90 TABLET | Refills: 1 | Status: SHIPPED | OUTPATIENT
Start: 2023-02-09 | End: 2023-11-07

## 2023-02-09 RX ORDER — METOPROLOL SUCCINATE 25 MG/1
TABLET, EXTENDED RELEASE ORAL
Qty: 90 TABLET | Refills: 3 | OUTPATIENT
Start: 2023-02-09

## 2023-02-09 RX ORDER — HYDROCHLOROTHIAZIDE 50 MG/1
TABLET ORAL
Qty: 90 TABLET | Refills: 3 | OUTPATIENT
Start: 2023-02-09

## 2023-02-09 NOTE — TELEPHONE ENCOUNTER
Outpatient Medication Detail     Disp Refills Start End SANTOSH   metoprolol succinate ER (TOPROL XL) 25 MG 24 hr tablet (Discontinued) 90 tablet 3 10/20/2022 11/27/2022 No   Sig - Route: Take 1 tablet (25 mg) by mouth daily - Oral   Sent to pharmacy as: Metoprolol Succinate ER 25 MG Oral Tablet Extended Release 24 Hour (TOPROL XL)   Class: E-Prescribe   Order: [id number]   E-Prescribing Status: Receipt confirmed by pharmacy (10/20/2022 11:22 AM CDT)   E-Cancel Status: Request approved by pharmacy (11/27/2022  4:24 PM CST)         Outpatient Medication Detail     Disp Refills Start End SANTOSH   hydrochlorothiazide (HYDRODIURIL) 50 MG tablet (Discontinued) 90 tablet 3 10/20/2022 12/5/2022 No   Sig - Route: Take 1 tablet (50 mg) by mouth daily - Oral   Sent to pharmacy as: hydroCHLOROthiazide 50 MG Oral Tablet (HYDRODIURIL)   Class: E-Prescribe   Order: [id number]   E-Prescribing Status: Receipt confirmed by pharmacy (10/20/2022 11:23 AM CDT)   E-Cancel Status: Request approved by pharmacy (12/5/2022  6:06 PM CST)

## 2023-02-09 NOTE — TELEPHONE ENCOUNTER
[facility] Call Center    Phone Message    May a detailed message be left on voicemail: no     Reason for Call: Medication Question or concern regarding medication   Prescription Clarification  Name of Medication: Resume metoprolol succinate 12.5 mg/day and Resume hydrochlorothiazide 12.5 mg/day   Prescribing Provider: Jaylen Gale MD    Pharmacy: [facility][id number] [address] AT [place] [address]   What on the order needs clarification?   Refill request per 2/6/23 office visit notes with Dr Gale in [place]. 90 day supply per Pt request          Action Taken: Other: [facility]    Travel Screening: Not Applicable

## 2023-02-11 NOTE — PROGRESS NOTES
"  HEART CARE ENCOUNTER NOTE      [facility]  [phone]      Assessment/Recommendations   Assessment:   1.  Patient with a history of essential hypertension on beta-blocker and diuretic therapy who has had intermittent symptoms of orthostatic lightheadedness.  I suspect this is mostly due to medication effect and would expected to improve with reduction of the dose of her antihypertensive regimen.      Plan:   1.  Resume metoprolol succinate at a lower dose of 12.5 mg/day.  2.  Resume hydrodiuril at a lower dose of 12.5 mg/day.  3. 12 lead ECG 1-2 weeks  4. We will arrange a f/u visit in 4 months           History of Present Illness/Subjective    HPI: Migdalia Coronado is a 72 year old female with a history of lightheaded spells.  These occur primarily when she stands up from sitting or or sits up after she has been lying supine.  She is not aware of any sensation of tachypalpitations and has had no overt syncope or presyncope.  She stopped taking her blood pressure medicines last week to see if this would help.  The lightheadedness has improved.    Studies reviewed:  ECG:ordered         Physical Examination  Review of Systems   Vitals: BP (!) 150/90 (BP Location: Right arm, Patient Position: Sitting, Cuff Size: Adult Large)   Pulse 75   Resp 16   Ht 1.695 m (5' 6.75\")   Wt 88 kg (194 lb)   BMI 30.61 kg/m    BMI= Body mass index is 30.61 kg/m .  Wt Readings from Last 3 Encounters:   02/06/23 88 kg (194 lb)   10/20/22 89.4 kg (197 lb)   08/10/22 92.6 kg (204 lb 2 oz)     Pleasant female no acute distress but anxious.  Blood pressure 150/90.  Lungs are clear  CV: Regular rate and rhythm no significant murmur.     Please refer above for cardiac ROS details.        Medical History  Surgical History Family History Social History   No past medical history on file.  Past Surgical History:   Procedure Laterality Date     BIOPSY BREAST       CHOLECYSTECTOMY  2008     ZZC APPENDECTOMY       ZZC LIGATE " FALLOPIAN TUBE       Family History   Problem Relation Age of Onset     Stomach Cancer Mother      Lung Cancer Father      Multiple myeloma Brother      Colon Polyps Son      Colon Cancer Nephew      Cerebrovascular Disease Maternal Grandmother      Breast Cancer No family hx of         Social History     Socioeconomic History     Marital status:      Spouse name: Not on file     Number of children: Not on file     Years of education: Not on file     Highest education level: Not on file   Occupational History     Not on file   Tobacco Use     Smoking status: Former     Types: Cigarettes     Smokeless tobacco: Never   Substance and Sexual Activity     Alcohol use: Yes     Alcohol/week: 1.0 standard drink     Comment: Alcoholic Drinks/day: occasionally     Drug use: No     Sexual activity: Not Currently     Partners: Male   Other Topics Concern     Not on file   Social History Narrative    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    Kristina Marquez MD  5/18/2018         Social Determinants of Health     Financial Resource Strain: Not on file   Food Insecurity: Not on file   Transportation Needs: Not on file   Physical Activity: Not on file   Stress: Not on file   Social Connections: Not on file   Intimate Partner Violence: Not on file   Housing Stability: Not on file           Medications  Allergies   Current Outpatient Medications   Medication Sig Dispense Refill     aspirin 81 MG EC tablet [ASPIRIN 81 MG EC TABLET] Take 81 mg by mouth daily.       atorvastatin (LIPITOR) 20 MG tablet [ATORVASTATIN (LIPITOR) 20 MG TABLET] TAKE 1 AND 1/2 TABLETS BY MOUTH EVERY  tablet 3     calcium-vitamin D 500-125 MG-UNIT TABS Take 1 tablet by mouth 2 times daily       escitalopram (LEXAPRO) 10 MG tablet Take 0.5 tablets (5 mg) by mouth daily 90 tablet 3     fluocinonide (LIDEX) 0.05 % external solution        fluticasone (FLONASE) 50 MCG/ACT nasal spray SHAKE LIQUID AND USE 2 SPRAYS IN EACH NOSTRIL  DAILY AS NEEDED 48 g 3     gabapentin (NEURONTIN) 300 MG capsule Take 1 capsule (300 mg) by mouth 3 times daily 270 capsule 3     potassium chloride ER (KLOR-CON M) 20 MEQ CR tablet TAKE 1 TABLET(20 MEQ) BY MOUTH DAILY 90 tablet 3     acetaminophen (TYLENOL) 500 MG tablet Take 1,000 mg by mouth every 6 hours as needed for mild pain (Patient not taking: Reported on 2/6/2023)       celecoxib (CELEBREX) 100 MG capsule Take 1 capsule (100 mg) by mouth 2 times daily (Patient not taking: Reported on 2/6/2023) 180 capsule 3     hydrochlorothiazide (HYDRODIURIL) 12.5 MG tablet Take 1 tablet (12.5 mg) by mouth daily 90 tablet 1     ibuprofen (ADVIL/MOTRIN) 800 MG tablet TAKE 1 TABLET(800 MG) BY MOUTH THREE TIMES DAILY AS NEEDED (Patient not taking: Reported on 2/6/2023) 90 tablet 3     metoprolol succinate ER (TOPROL XL) 25 MG 24 hr tablet Take 0.5 tablets (12.5 mg) by mouth daily 45 tablet 1       Allergies   Allergen Reactions     Ciprofloxacin Hives     Diatrizoate Meglumine [Diatrizoate] Nausea and Vomiting     Linzess [Linaclotide] Unknown     Stomach pain     Macrolide Antibiotics [Macrolides] Hives     Metoprolol      Orthostatic hypotension     Penicillins Hives          Lab Results    Chemistry/lipid CBC Cardiac Enzymes/BNP/TSH/INR   Recent Labs   Lab Test 10/20/22  1153   CHOL 174   HDL 62   LDL 94   TRIG 90     Recent Labs   Lab Test 10/20/22  1153 04/18/22  1022 10/18/21  1116   LDL 94 87 88     Recent Labs   Lab Test 10/20/22  1153      POTASSIUM 3.8   CHLORIDE 103   CO2 27   GLC 96   BUN 20.1   CR 0.84   GFRESTIMATED 74   BARON 9.6     Recent Labs   Lab Test 10/20/22  1153 04/18/22  1022 10/18/21  1116   CR 0.84 0.82 0.98     No results for input(s): A1C in the last 90541 hours.       Recent Labs   Lab Test 10/20/22  1153   WBC 6.4   HGB 13.5   HCT 40.6   MCV 89        Recent Labs   Lab Test 10/20/22  1153 08/10/22  1628 10/18/21  1116   HGB 13.5 12.6 14.4    No results for input(s): TROPONINI in the  last 76984 hours.  No results for input(s): BNP, NTBNPI, NTBNP in the last 55396 hours.  Recent Labs   Lab Test 10/20/22  1153   TSH 3.60     No results for input(s): INR in the last 19236 hours.     Jaylen Gale MD

## 2023-02-13 ENCOUNTER — TELEPHONE (OUTPATIENT)
Dept: CARDIOLOGY | Facility: CLINIC | Age: 73
End: 2023-02-13
Payer: MEDICARE

## 2023-02-13 DIAGNOSIS — E87.6 HYPOPOTASSEMIA: ICD-10-CM

## 2023-02-13 DIAGNOSIS — I10 ESSENTIAL HYPERTENSION, BENIGN: Primary | ICD-10-CM

## 2023-02-13 NOTE — TELEPHONE ENCOUNTER
Per Dr. Gale's 2-6-23 visit note:  Plan:   1.  Resume metoprolol succinate at a lower dose of 12.5 mg/day.  2.  Resume hydrodiuril at a lower dose of 12.5 mg/day.  3. 12 lead ECG 1-2 weeks  4. We will arrange a f/u visit in 4 months    Order placed per protocol - msg sent to sched team for follow-up.  mg

## 2023-02-13 NOTE — TELEPHONE ENCOUNTER
----- Message from Jaylen Gale MD sent at 2/11/2023  2:39 PM CST -----  I didn't get an ECG when she was here in clinic (didn't sign the order until today)could you call her and ask how she is doing and see if she could come in to get an ECG to make sure there is no significant conduction system disease. Thanks

## 2023-02-16 ENCOUNTER — HOSPITAL ENCOUNTER (OUTPATIENT)
Dept: CARDIOLOGY | Facility: HOSPITAL | Age: 73
Discharge: HOME | End: 2023-02-16
Attending: INTERNAL MEDICINE | Admitting: INTERNAL MEDICINE
Payer: MEDICARE

## 2023-02-16 DIAGNOSIS — E87.6 HYPOPOTASSEMIA: ICD-10-CM

## 2023-02-16 DIAGNOSIS — I10 ESSENTIAL HYPERTENSION, BENIGN: ICD-10-CM

## 2023-02-16 LAB
ATRIAL RATE - MUSE: 64 BPM
DIASTOLIC BLOOD PRESSURE - MUSE: NORMAL MMHG
INTERPRETATION ECG - MUSE: NORMAL
P AXIS - MUSE: 79 DEGREES
PR INTERVAL - MUSE: 206 MS
QRS DURATION - MUSE: 88 MS
QT - MUSE: 420 MS
QTC - MUSE: 433 MS
R AXIS - MUSE: -40 DEGREES
SYSTOLIC BLOOD PRESSURE - MUSE: NORMAL MMHG
T AXIS - MUSE: 60 DEGREES
VENTRICULAR RATE- MUSE: 64 BPM

## 2023-02-16 PROCEDURE — 93010 ELECTROCARDIOGRAM REPORT: CPT | Mod: HOP | Performed by: INTERNAL MEDICINE

## 2023-02-16 PROCEDURE — 93005 ELECTROCARDIOGRAM TRACING: CPT

## 2023-02-20 NOTE — TELEPHONE ENCOUNTER
Please review 2-16-23 EKG results in [facility] - follow-up pending 6/2023 - any new orders at this time?  mg

## 2023-02-28 DIAGNOSIS — R52 PAIN: ICD-10-CM

## 2023-03-01 RX ORDER — IBUPROFEN 800 MG/1
TABLET, FILM COATED ORAL
Qty: 90 TABLET | Refills: 3 | Status: SHIPPED | OUTPATIENT
Start: 2023-03-01 | End: 2024-03-19

## 2023-03-01 NOTE — TELEPHONE ENCOUNTER
"Routing refill request to provider for review/approval because:  BP is not normal  Age out of range per protocol  Break in medication    Last Written Prescription Date:  11/24/21  Last Fill Quantity: 90,  # refills: 3   Last office visit provider:  10/20/22     Requested Prescriptions   Pending Prescriptions Disp Refills     ibuprofen (ADVIL/MOTRIN) 800 MG tablet [Pharmacy Med Name: IBUPROFEN 800MG TABLETS] 90 tablet 3     Sig: TAKE 1 TABLET(800 MG) BY MOUTH THREE TIMES DAILY AS NEEDED       NSAID Medications Failed - 3/1/2023  2:17 PM        Failed - Blood pressure under 140/90 in past 12 months     BP Readings from Last 3 Encounters:   02/06/23 (!) 150/90   12/07/22 111/76   10/20/22 121/77                 Failed - Patient is age 6-64 years        Passed - Normal ALT on file in past 12 months     Recent Labs   Lab Test 10/20/22  1153   ALT 18             Passed - Normal AST on file in past 12 months     Recent Labs   Lab Test 10/20/22  1153   AST 21             Passed - Recent (12 mo) or future (30 days) visit within the authorizing provider's specialty     Patient has had an office visit with the authorizing provider or a provider within the authorizing providers department within the previous 12 mos or has a future within next 30 days. See \"Patient Info\" tab in inbasket, or \"Choose Columns\" in Meds & Orders section of the refill encounter.              Passed - Normal CBC on file in past 12 months     Recent Labs   Lab Test 10/20/22  1153   WBC 6.4   RBC 4.56   HGB 13.5   HCT 40.6                    Passed - Medication is active on med list        Passed - No active pregnancy on record        Passed - Normal serum creatinine on file in past 12 months     Recent Labs   Lab Test 10/20/22  1153   CR 0.84       Ok to refill medication if creatinine is low          Passed - No positive pregnancy test in past 12 months             Hazel Cobb RN 03/01/23 2:17 PM  "

## 2023-05-19 DIAGNOSIS — R09.82 POST-NASAL DRIP: ICD-10-CM

## 2023-05-20 RX ORDER — FLUTICASONE PROPIONATE 50 MCG
SPRAY, SUSPENSION (ML) NASAL
Qty: 48 G | Refills: 3 | Status: SHIPPED | OUTPATIENT
Start: 2023-05-20

## 2023-05-20 NOTE — TELEPHONE ENCOUNTER
"Last Written Prescription Date:  3/25/22  Last Fill Quantity: 48g,  # refills: 3   Last office visit provider:  10/20/22     Requested Prescriptions   Pending Prescriptions Disp Refills     fluticasone (FLONASE) 50 MCG/ACT nasal spray [Pharmacy Med Name: FLUTICASONE 50MCG NASAL SP (120) RX] 48 g 3     Sig: SHAKE LIQUID AND USE 2 SPRAYS IN EACH NOSTRIL DAILY AS NEEDED       Nasal Allergy Protocol Passed - 5/20/2023  2:37 AM        Passed - Patient is age 12 or older        Passed - Recent (12 mo) or future (30 days) visit within the authorizing provider's specialty     Patient has had an office visit with the authorizing provider or a provider within the authorizing providers department within the previous 12 mos or has a future within next 30 days. See \"Patient Info\" tab in inbasket, or \"Choose Columns\" in Meds & Orders section of the refill encounter.              Passed - Medication is active on med list             Hazel Cobb RN 05/20/23 2:38 AM  "

## 2023-07-11 ENCOUNTER — OFFICE VISIT (OUTPATIENT)
Dept: CARDIOLOGY | Facility: CLINIC | Age: 73
End: 2023-07-11
Attending: INTERNAL MEDICINE
Payer: MEDICARE

## 2023-07-11 VITALS
WEIGHT: 196 LBS | RESPIRATION RATE: 16 BRPM | HEART RATE: 66 BPM | SYSTOLIC BLOOD PRESSURE: 166 MMHG | DIASTOLIC BLOOD PRESSURE: 90 MMHG | BODY MASS INDEX: 30.76 KG/M2 | HEIGHT: 67 IN

## 2023-07-11 DIAGNOSIS — I10 ESSENTIAL HYPERTENSION, BENIGN: ICD-10-CM

## 2023-07-11 PROCEDURE — 99213 OFFICE O/P EST LOW 20 MIN: CPT | Performed by: INTERNAL MEDICINE

## 2023-07-11 NOTE — PROGRESS NOTES
"  HEART CARE ENCOUNTER NOTE      [facility]  [phone]      Assessment/Recommendations   Assessment:   1.  Hypertension.  Reasonable control in general based on home records.  He still has about a 30 mm orthostatic drop but starts out a bit higher on her reduced dose of medications compared to her visit last February.  She has no orthostatic symptoms associated with the blood pressure drop from 160 down to 130.  I would not intensify her regimen more at this point as she is likely to have recurrent orthostatic symptoms if we drop her basal blood pressure lower.      Plan:   1.  Continue present regimen without changes  2.  Try to exercise more if possible  3.  Follow-up cardiology clinic if needed.         History of Present Illness/Subjective    HPI: Migdalia Coronado is a 72 year old female whom I saw earlier this year with symptoms of orthostatic lightheadedness.  She has a history of hypertension but blood pressures were dropped from 130s down to the 80s or 90s and she would become lightheaded.  I suggested reducing her regimen including her diuretic dose and her beta-blocker dose.  With this she has had no recurrence of her orthostatic lightheadedness.  Blood pressure readings at home are usually in 130s to 140s but she still notes that when she stands up it may drop as much is 30 points probably she is not having the lightheaded symptoms.    Studies reviewed  No new studies       Physical Examination  Review of Systems   BP (!) 166/90 (BP Location: Right arm, Patient Position: Sitting, Cuff Size: Adult Regular)   Pulse 66   Resp 16   Ht 1.695 m (5' 6.75\")   Wt 88.9 kg (196 lb)   BMI 30.93 kg/m    Body mass index is 30.93 kg/m .  Wt Readings from Last 3 Encounters:   07/11/23 88.9 kg (196 lb)   02/06/23 88 kg (194 lb)   10/20/22 89.4 kg (197 lb)   Standing blood pressure 138/86    General Appearance:   Pleasant 72-year-old female appears stated age. no acute distress, normal body " habitus   ENT/Mouth: Oropharynx normal    EYES:  no scleral icterus, normal conjunctivae. No eyelid xanthelasma   Neck: No cervical lymphadenopathy  Thyroid not enlarged or nodular  No JVD   Respiratory:   lungs clear , no rales or wheezing, Normal chest wall expansion    Cardiovascular:    Regular rhythm, normal rate. Normal 1st and 2nd heart sounds.  No significant murmurs, normal rubs or gallops;   radial pulses are full and equal   Jugular venous pressure is not elevated   Abdomen/GI:  No tenderness, no organomegaly, no pulsatile masses. NBS.   Extremities: No cyanosis or clubbing.  No peripheral edema   Skin: No rash or lesions   Heme/lymph/ Immunology No lymphadenopathy, petechiae   Neurologic: Alert and oriented. No focal motor weakness, gait appears normal.       Psychiatric: Pleasant, calm, appropriate affect.          No known hepatic, renal, pulmonary, or CNS disorders. The remainder of the complete ROS is negative except as noted above.         Medical History  Surgical History Family History Social History   History reviewed. No pertinent past medical history.  Past Surgical History:   Procedure Laterality Date     BIOPSY BREAST       CHOLECYSTECTOMY  2008     ZZC APPENDECTOMY       ZZC LIGATE FALLOPIAN TUBE       Family History   Problem Relation Age of Onset     Stomach Cancer Mother      Lung Cancer Father      Multiple myeloma Brother      Colon Polyps Son      Colon Cancer Nephew      Cerebrovascular Disease Maternal Grandmother      Breast Cancer No family hx of         Social History     Socioeconomic History     Marital status:      Spouse name: Not on file     Number of children: Not on file     Years of education: Not on file     Highest education level: Not on file   Occupational History     Not on file   Tobacco Use     Smoking status: Former     Types: Cigarettes     Smokeless tobacco: Never   Substance and Sexual Activity     Alcohol use: Yes     Alcohol/week: 1.0 standard drink of  alcohol     Comment: Alcoholic Drinks/day: occasionally     Drug use: No     Sexual activity: Not Currently     Partners: Male   Other Topics Concern     Not on file   Social History Narrative    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    Kristina Marquez MD  5/18/2018         Social Determinants of Health     Financial Resource Strain: Not on file   Food Insecurity: Not on file   Transportation Needs: Not on file   Physical Activity: Not on file   Stress: Not on file   Social Connections: Not on file   Intimate Partner Violence: Not on file   Housing Stability: Not on file           Medications  Allergies   Current Outpatient Medications   Medication Sig Dispense Refill     aspirin 81 MG EC tablet [ASPIRIN 81 MG EC TABLET] Take 81 mg by mouth daily.       atorvastatin (LIPITOR) 20 MG tablet [ATORVASTATIN (LIPITOR) 20 MG TABLET] TAKE 1 AND 1/2 TABLETS BY MOUTH EVERY  tablet 3     calcium-vitamin D 500-125 MG-UNIT TABS Take 1 tablet by mouth 2 times daily       escitalopram (LEXAPRO) 10 MG tablet Take 0.5 tablets (5 mg) by mouth daily 90 tablet 3     fluocinonide (LIDEX) 0.05 % external solution        fluticasone (FLONASE) 50 MCG/ACT nasal spray SHAKE LIQUID AND USE 2 SPRAYS IN EACH NOSTRIL DAILY AS NEEDED 48 g 3     gabapentin (NEURONTIN) 300 MG capsule Take 1 capsule (300 mg) by mouth 3 times daily 270 capsule 3     hydrochlorothiazide (HYDRODIURIL) 12.5 MG tablet Take 1 tablet (12.5 mg) by mouth daily 90 tablet 1     ibuprofen (ADVIL/MOTRIN) 800 MG tablet TAKE 1 TABLET(800 MG) BY MOUTH THREE TIMES DAILY AS NEEDED 90 tablet 3     metoprolol succinate ER (TOPROL XL) 25 MG 24 hr tablet Take 0.5 tablets (12.5 mg) by mouth daily 45 tablet 1     potassium chloride ER (KLOR-CON M) 20 MEQ CR tablet TAKE 1 TABLET(20 MEQ) BY MOUTH DAILY 90 tablet 3     acetaminophen (TYLENOL) 500 MG tablet Take 1,000 mg by mouth every 6 hours as needed for mild pain (Patient not taking: Reported on 2/6/2023)        celecoxib (CELEBREX) 100 MG capsule Take 1 capsule (100 mg) by mouth 2 times daily (Patient not taking: Reported on 2/6/2023) 180 capsule 3       Allergies   Allergen Reactions     Ciprofloxacin Hives     Diatrizoate Meglumine [Diatrizoate] Nausea and Vomiting     Linzess [Linaclotide] Unknown     Stomach pain     Macrolide Antibiotics [Macrolides And Ketolides] Hives     Metoprolol      Orthostatic hypotension     Penicillins Hives          Lab Results    Chemistry/lipid CBC Cardiac Enzymes/BNP/TSH/INR   Recent Labs   Lab Test 10/20/22  1153   CHOL 174   HDL 62   LDL 94   TRIG 90     Recent Labs   Lab Test 10/20/22  1153 04/18/22  1022 10/18/21  1116   LDL 94 87 88     Recent Labs   Lab Test 10/20/22  1153      POTASSIUM 3.8   CHLORIDE 103   CO2 27   GLC 96   BUN 20.1   CR 0.84   GFRESTIMATED 74   BARON 9.6     Recent Labs   Lab Test 10/20/22  1153 04/18/22  1022 10/18/21  1116   CR 0.84 0.82 0.98     No results for input(s): A1C in the last 03968 hours.       Recent Labs   Lab Test 10/20/22  1153   WBC 6.4   HGB 13.5   HCT 40.6   MCV 89        Recent Labs   Lab Test 10/20/22  1153 08/10/22  1628 10/18/21  1116   HGB 13.5 12.6 14.4    No results for input(s): TROPONINI in the last 72957 hours.  No results for input(s): BNP, NTBNPI, NTBNP in the last 04650 hours.  Recent Labs   Lab Test 10/20/22  1153   TSH 3.60     No results for input(s): INR in the last 75491 hours.     Jaylen Gale MD

## 2023-07-11 NOTE — LETTER
"7/11/2023    Anahi Aguilar MD  [address]  [facility] [address]    RE: Migdalia Coronado       Dear Colleague,     I had the [facility].    HEART CARE ENCOUNTER NOTE      [facility]  [phone]      Assessment/Recommendations   Assessment:   1.  Hypertension.  Reasonable control in general based on home records.  He still has about a 30 mm orthostatic drop but starts out a bit higher on her reduced dose of medications compared to her visit last February.  She has no orthostatic symptoms associated with the blood pressure drop from 160 down to 130.  I would not intensify her regimen more at this point as she is likely to have recurrent orthostatic symptoms if we drop her basal blood pressure lower.      Plan:   1.  Continue present regimen without changes  2.  Try to exercise more if possible  3.  Follow-up cardiology clinic if needed.         History of Present Illness/Subjective    HPI: Migdalia Coronado is a 72 year old female whom I saw earlier this year with symptoms of orthostatic lightheadedness.  She has a history of hypertension but blood pressures were dropped from 130s down to the 80s or 90s and she would become lightheaded.  I suggested reducing her regimen including her diuretic dose and her beta-blocker dose.  With this she has had no recurrence of her orthostatic lightheadedness.  Blood pressure readings at home are usually in 130s to 140s but she still notes that when she stands up it may drop as much is 30 points probably she is not having the lightheaded symptoms.    Studies reviewed  No new studies       Physical Examination  Review of Systems   BP (!) 166/90 (BP Location: Right arm, Patient Position: Sitting, Cuff Size: Adult Regular)   Pulse 66   Resp 16   Ht 1.695 m (5' 6.75\")   Wt 88.9 kg (196 lb)   BMI 30.93 kg/m    Body mass index is 30.93 kg/m .  Wt Readings from Last 3 Encounters:   07/11/23 88.9 kg " (196 lb)   02/06/23 88 kg (194 lb)   10/20/22 89.4 kg (197 lb)   Standing blood pressure 138/86    General Appearance:   Pleasant 72-year-old female appears stated age. no acute distress, normal body habitus   ENT/Mouth: Oropharynx normal    EYES:  no scleral icterus, normal conjunctivae. No eyelid xanthelasma   Neck: No cervical lymphadenopathy  Thyroid not enlarged or nodular  No JVD   Respiratory:   lungs clear , no rales or wheezing, Normal chest wall expansion    Cardiovascular:    Regular rhythm, normal rate. Normal 1st and 2nd heart sounds.  No significant murmurs, normal rubs or gallops;   radial pulses are full and equal   Jugular venous pressure is not elevated   Abdomen/GI:  No tenderness, no organomegaly, no pulsatile masses. NBS.   Extremities: No cyanosis or clubbing.  No peripheral edema   Skin: No rash or lesions   Heme/lymph/ Immunology No lymphadenopathy, petechiae   Neurologic: Alert and oriented. No focal motor weakness, gait appears normal.       Psychiatric: Pleasant, calm, appropriate affect.          No known hepatic, renal, pulmonary, or CNS disorders. The remainder of the complete ROS is negative except as noted above.         Medical History  Surgical History Family History Social History   History reviewed. No pertinent past medical history.  Past Surgical History:   Procedure Laterality Date    BIOPSY BREAST      CHOLECYSTECTOMY  2008    ZZC APPENDECTOMY      ZZC LIGATE FALLOPIAN TUBE       Family History   Problem Relation Age of Onset    Stomach Cancer Mother     Lung Cancer Father     Multiple myeloma Brother     Colon Polyps Son     Colon Cancer Nephew     Cerebrovascular Disease Maternal Grandmother     Breast Cancer No family hx of         Social History     Socioeconomic History    Marital status:      Spouse name: Not on file    Number of children: Not on file    Years of education: Not on file    Highest education level: Not on file   Occupational History    Not on file    Tobacco Use    Smoking status: Former     Types: Cigarettes    Smokeless tobacco: Never   Substance and Sexual Activity    Alcohol use: Yes     Alcohol/week: 1.0 standard drink of alcohol     Comment: Alcoholic Drinks/day: occasionally    Drug use: No    Sexual activity: Not Currently     Partners: Male   Other Topics Concern    Not on file   Social History Narrative    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    Kristina Marquez MD  5/18/2018         Social Determinants of Health     Financial Resource Strain: Not on file   Food Insecurity: Not on file   Transportation Needs: Not on file   Physical Activity: Not on file   Stress: Not on file   Social Connections: Not on file   Intimate Partner Violence: Not on file   Housing Stability: Not on file           Medications  Allergies   Current Outpatient Medications   Medication Sig Dispense Refill    aspirin 81 MG EC tablet [ASPIRIN 81 MG EC TABLET] Take 81 mg by mouth daily.      atorvastatin (LIPITOR) 20 MG tablet [ATORVASTATIN (LIPITOR) 20 MG TABLET] TAKE 1 AND 1/2 TABLETS BY MOUTH EVERY  tablet 3    calcium-vitamin D 500-125 MG-UNIT TABS Take 1 tablet by mouth 2 times daily      escitalopram (LEXAPRO) 10 MG tablet Take 0.5 tablets (5 mg) by mouth daily 90 tablet 3    fluocinonide (LIDEX) 0.05 % external solution       fluticasone (FLONASE) 50 MCG/ACT nasal spray SHAKE LIQUID AND USE 2 SPRAYS IN EACH NOSTRIL DAILY AS NEEDED 48 g 3    gabapentin (NEURONTIN) 300 MG capsule Take 1 capsule (300 mg) by mouth 3 times daily 270 capsule 3    hydrochlorothiazide (HYDRODIURIL) 12.5 MG tablet Take 1 tablet (12.5 mg) by mouth daily 90 tablet 1    ibuprofen (ADVIL/MOTRIN) 800 MG tablet TAKE 1 TABLET(800 MG) BY MOUTH THREE TIMES DAILY AS NEEDED 90 tablet 3    metoprolol succinate ER (TOPROL XL) 25 MG 24 hr tablet Take 0.5 tablets (12.5 mg) by mouth daily 45 tablet 1    potassium chloride ER (KLOR-CON M) 20 MEQ CR tablet TAKE 1 TABLET(20 MEQ) BY MOUTH  DAILY 90 tablet 3    acetaminophen (TYLENOL) 500 MG tablet Take 1,000 mg by mouth every 6 hours as needed for mild pain (Patient not taking: Reported on 2/6/2023)      celecoxib (CELEBREX) 100 MG capsule Take 1 capsule (100 mg) by mouth 2 times daily (Patient not taking: Reported on 2/6/2023) 180 capsule 3       Allergies   Allergen Reactions    Ciprofloxacin Hives    Diatrizoate Meglumine [Diatrizoate] Nausea and Vomiting    Linzess [Linaclotide] Unknown     Stomach pain    Macrolide Antibiotics [Macrolides And Ketolides] Hives    Metoprolol      Orthostatic hypotension    Penicillins Hives          Lab Results    Chemistry/lipid CBC Cardiac Enzymes/BNP/TSH/INR   Recent Labs   Lab Test 10/20/22  1153   CHOL 174   HDL 62   LDL 94   TRIG 90     Recent Labs   Lab Test 10/20/22  1153 04/18/22  1022 10/18/21  1116   LDL 94 87 88     Recent Labs   Lab Test 10/20/22  1153      POTASSIUM 3.8   CHLORIDE 103   CO2 27   GLC 96   BUN 20.1   CR 0.84   GFRESTIMATED 74   BARON 9.6     Recent Labs   Lab Test 10/20/22  1153 04/18/22  1022 10/18/21  1116   CR 0.84 0.82 0.98     No results for input(s): A1C in the last 47858 hours.       Recent Labs   Lab Test 10/20/22  1153   WBC 6.4   HGB 13.5   HCT 40.6   MCV 89        Recent Labs   Lab Test 10/20/22  1153 08/10/22  1628 10/18/21  1116   HGB 13.5 12.6 14.4    No results for input(s): TROPONINI in the last 92506 hours.  No results for input(s): BNP, NTBNPI, NTBNP in the last 93687 hours.  Recent Labs   Lab Test 10/20/22  1153   TSH 3.60     No results for input(s): INR in the last 67365 hours.     Jaylen Gale MD                                        Thank you for allowing me to participate in the care of your patient.      Sincerely,     Jaylen Gale MD     [facility]  cc:   Jaylen Gale MD  [address]  [place] [address]

## 2023-07-17 ENCOUNTER — MYC MEDICAL ADVICE (OUTPATIENT)
Dept: FAMILY MEDICINE | Facility: CLINIC | Age: 73
End: 2023-07-17
Payer: MEDICARE

## 2023-07-17 DIAGNOSIS — I10 ESSENTIAL HYPERTENSION, BENIGN: Primary | ICD-10-CM

## 2023-08-14 DIAGNOSIS — M25.562 LEFT KNEE PAIN, UNSPECIFIED CHRONICITY: Primary | ICD-10-CM

## 2023-08-25 ENCOUNTER — TRANSFERRED RECORDS (OUTPATIENT)
Dept: HEALTH INFORMATION MANAGEMENT | Facility: CLINIC | Age: 73
End: 2023-08-25

## 2023-09-06 DIAGNOSIS — M51.379 DEGENERATION OF LUMBAR OR LUMBOSACRAL INTERVERTEBRAL DISC: ICD-10-CM

## 2023-09-06 RX ORDER — GABAPENTIN 300 MG/1
300 CAPSULE ORAL 3 TIMES DAILY
Qty: 270 CAPSULE | Refills: 3 | Status: SHIPPED | OUTPATIENT
Start: 2023-09-06 | End: 2024-05-15

## 2023-09-07 ENCOUNTER — E-VISIT (OUTPATIENT)
Dept: FAMILY MEDICINE | Facility: CLINIC | Age: 73
End: 2023-09-07
Payer: MEDICARE

## 2023-09-07 DIAGNOSIS — N64.4 BREAST PAIN, LEFT: Primary | ICD-10-CM

## 2023-09-07 PROCEDURE — 99421 OL DIG E/M SVC 5-10 MIN: CPT | Performed by: FAMILY MEDICINE

## 2023-09-20 ENCOUNTER — PATIENT OUTREACH (OUTPATIENT)
Dept: CARE COORDINATION | Facility: CLINIC | Age: 73
End: 2023-09-20
Payer: MEDICARE

## 2023-09-22 ENCOUNTER — ANCILLARY PROCEDURE (OUTPATIENT)
Dept: MAMMOGRAPHY | Facility: CLINIC | Age: 73
End: 2023-09-22
Attending: FAMILY MEDICINE
Payer: MEDICARE

## 2023-09-22 DIAGNOSIS — N64.4 BREAST PAIN, LEFT: ICD-10-CM

## 2023-09-22 PROCEDURE — 77066 DX MAMMO INCL CAD BI: CPT

## 2023-09-22 PROCEDURE — 76642 ULTRASOUND BREAST LIMITED: CPT | Mod: 50

## 2023-10-05 ENCOUNTER — IMMUNIZATION (OUTPATIENT)
Dept: NURSING | Facility: CLINIC | Age: 73
End: 2023-10-05
Payer: MEDICARE

## 2023-10-05 PROCEDURE — 91320 SARSCV2 VAC 30MCG TRS-SUC IM: CPT

## 2023-10-05 PROCEDURE — G0008 ADMIN INFLUENZA VIRUS VAC: HCPCS | Mod: 59

## 2023-10-05 PROCEDURE — 90480 ADMN SARSCOV2 VAC 1/ONLY CMP: CPT

## 2023-10-05 PROCEDURE — 90662 IIV NO PRSV INCREASED AG IM: CPT

## 2023-10-25 DIAGNOSIS — F41.9 ANXIETY: ICD-10-CM

## 2023-10-25 RX ORDER — ESCITALOPRAM OXALATE 10 MG/1
10 TABLET ORAL DAILY
Qty: 90 TABLET | Refills: 3 | Status: SHIPPED | OUTPATIENT
Start: 2023-10-25 | End: 2024-01-24

## 2023-11-02 ENCOUNTER — PATIENT OUTREACH (OUTPATIENT)
Dept: GASTROENTEROLOGY | Facility: CLINIC | Age: 73
End: 2023-11-02
Payer: MEDICARE

## 2023-11-02 ENCOUNTER — MYC MEDICAL ADVICE (OUTPATIENT)
Dept: FAMILY MEDICINE | Facility: CLINIC | Age: 73
End: 2023-11-02
Payer: MEDICARE

## 2023-11-02 DIAGNOSIS — M76.30 IT BAND SYNDROME, UNSPECIFIED LATERALITY: Primary | ICD-10-CM

## 2023-11-06 DIAGNOSIS — E78.5 HYPERLIPIDEMIA LDL GOAL <130: ICD-10-CM

## 2023-11-06 DIAGNOSIS — I10 ESSENTIAL HYPERTENSION, BENIGN: ICD-10-CM

## 2023-11-06 RX ORDER — ATORVASTATIN CALCIUM 20 MG/1
TABLET, FILM COATED ORAL
Qty: 135 TABLET | Refills: 0 | Status: SHIPPED | OUTPATIENT
Start: 2023-11-06 | End: 2024-01-29

## 2023-11-06 RX ORDER — POTASSIUM CHLORIDE 1500 MG/1
TABLET, EXTENDED RELEASE ORAL
Qty: 90 TABLET | Refills: 0 | Status: SHIPPED | OUTPATIENT
Start: 2023-11-06 | End: 2024-02-27

## 2023-11-07 DIAGNOSIS — I10 ESSENTIAL HYPERTENSION, BENIGN: ICD-10-CM

## 2023-11-07 RX ORDER — HYDROCHLOROTHIAZIDE 12.5 MG/1
12.5 TABLET ORAL DAILY
Qty: 90 TABLET | Refills: 1 | Status: SHIPPED | OUTPATIENT
Start: 2023-11-07 | End: 2024-06-19

## 2023-11-07 RX ORDER — METOPROLOL SUCCINATE 25 MG/1
12.5 TABLET, EXTENDED RELEASE ORAL DAILY
Qty: 45 TABLET | Refills: 1 | Status: SHIPPED | OUTPATIENT
Start: 2023-11-07

## 2023-11-28 ENCOUNTER — TRANSFERRED RECORDS (OUTPATIENT)
Dept: HEALTH INFORMATION MANAGEMENT | Facility: CLINIC | Age: 73
End: 2023-11-28
Payer: MEDICARE

## 2023-12-24 ENCOUNTER — HEALTH MAINTENANCE LETTER (OUTPATIENT)
Age: 73
End: 2023-12-24

## 2024-01-17 ASSESSMENT — ENCOUNTER SYMPTOMS
CHILLS: 0
HEMATOCHEZIA: 0
DIZZINESS: 1
HEARTBURN: 0
HEADACHES: 0
PARESTHESIAS: 0
COUGH: 0
NAUSEA: 0
FEVER: 0
SORE THROAT: 0
PALPITATIONS: 0
CONSTIPATION: 1
NERVOUS/ANXIOUS: 1
DYSURIA: 0
BREAST MASS: 0
SHORTNESS OF BREATH: 0
FREQUENCY: 1
HEMATURIA: 0
ABDOMINAL PAIN: 0
MYALGIAS: 1
DIARRHEA: 0
JOINT SWELLING: 0
ARTHRALGIAS: 1
EYE PAIN: 0
WEAKNESS: 1

## 2024-01-17 ASSESSMENT — ACTIVITIES OF DAILY LIVING (ADL): CURRENT_FUNCTION: NO ASSISTANCE NEEDED

## 2024-01-23 ASSESSMENT — PATIENT HEALTH QUESTIONNAIRE - PHQ9
SUM OF ALL RESPONSES TO PHQ QUESTIONS 1-9: 8
10. IF YOU CHECKED OFF ANY PROBLEMS, HOW DIFFICULT HAVE THESE PROBLEMS MADE IT FOR YOU TO DO YOUR WORK, TAKE CARE OF THINGS AT HOME, OR GET ALONG WITH OTHER PEOPLE: NOT DIFFICULT AT ALL
SUM OF ALL RESPONSES TO PHQ QUESTIONS 1-9: 8

## 2024-01-23 ASSESSMENT — ANXIETY QUESTIONNAIRES
7. FEELING AFRAID AS IF SOMETHING AWFUL MIGHT HAPPEN: SEVERAL DAYS
4. TROUBLE RELAXING: SEVERAL DAYS
GAD7 TOTAL SCORE: 5
7. FEELING AFRAID AS IF SOMETHING AWFUL MIGHT HAPPEN: SEVERAL DAYS
1. FEELING NERVOUS, ANXIOUS, OR ON EDGE: SEVERAL DAYS
GAD7 TOTAL SCORE: 5
5. BEING SO RESTLESS THAT IT IS HARD TO SIT STILL: NOT AT ALL
6. BECOMING EASILY ANNOYED OR IRRITABLE: NOT AT ALL
8. IF YOU CHECKED OFF ANY PROBLEMS, HOW DIFFICULT HAVE THESE MADE IT FOR YOU TO DO YOUR WORK, TAKE CARE OF THINGS AT HOME, OR GET ALONG WITH OTHER PEOPLE?: NOT DIFFICULT AT ALL
3. WORRYING TOO MUCH ABOUT DIFFERENT THINGS: SEVERAL DAYS
GAD7 TOTAL SCORE: 5
IF YOU CHECKED OFF ANY PROBLEMS ON THIS QUESTIONNAIRE, HOW DIFFICULT HAVE THESE PROBLEMS MADE IT FOR YOU TO DO YOUR WORK, TAKE CARE OF THINGS AT HOME, OR GET ALONG WITH OTHER PEOPLE: NOT DIFFICULT AT ALL
2. NOT BEING ABLE TO STOP OR CONTROL WORRYING: SEVERAL DAYS

## 2024-01-24 ENCOUNTER — OFFICE VISIT (OUTPATIENT)
Dept: FAMILY MEDICINE | Facility: CLINIC | Age: 74
End: 2024-01-24
Payer: MEDICARE

## 2024-01-24 VITALS
WEIGHT: 181.2 LBS | SYSTOLIC BLOOD PRESSURE: 143 MMHG | HEIGHT: 67 IN | DIASTOLIC BLOOD PRESSURE: 78 MMHG | OXYGEN SATURATION: 97 % | TEMPERATURE: 98 F | HEART RATE: 79 BPM | RESPIRATION RATE: 16 BRPM | BODY MASS INDEX: 28.44 KG/M2

## 2024-01-24 DIAGNOSIS — I10 ESSENTIAL HYPERTENSION, BENIGN: ICD-10-CM

## 2024-01-24 DIAGNOSIS — D12.6 ADENOMATOUS POLYP OF COLON, UNSPECIFIED PART OF COLON: ICD-10-CM

## 2024-01-24 DIAGNOSIS — F41.9 ANXIETY: ICD-10-CM

## 2024-01-24 DIAGNOSIS — Z12.11 SCREEN FOR COLON CANCER: ICD-10-CM

## 2024-01-24 DIAGNOSIS — E78.5 HYPERLIPIDEMIA LDL GOAL <130: ICD-10-CM

## 2024-01-24 DIAGNOSIS — F41.1 ANXIETY STATE: ICD-10-CM

## 2024-01-24 DIAGNOSIS — E55.9 VITAMIN D DEFICIENCY: ICD-10-CM

## 2024-01-24 DIAGNOSIS — M48.061 SPINAL STENOSIS OF LUMBAR REGION, UNSPECIFIED WHETHER NEUROGENIC CLAUDICATION PRESENT: ICD-10-CM

## 2024-01-24 DIAGNOSIS — Z00.00 ENCOUNTER FOR MEDICARE ANNUAL WELLNESS EXAM: Primary | ICD-10-CM

## 2024-01-24 LAB
ERYTHROCYTE [DISTWIDTH] IN BLOOD BY AUTOMATED COUNT: 13.7 % (ref 10–15)
HCT VFR BLD AUTO: 39.7 % (ref 35–47)
HGB BLD-MCNC: 13.1 G/DL (ref 11.7–15.7)
MCH RBC QN AUTO: 29.5 PG (ref 26.5–33)
MCHC RBC AUTO-ENTMCNC: 33 G/DL (ref 31.5–36.5)
MCV RBC AUTO: 89 FL (ref 78–100)
PLATELET # BLD AUTO: 246 10E3/UL (ref 150–450)
RBC # BLD AUTO: 4.44 10E6/UL (ref 3.8–5.2)
WBC # BLD AUTO: 4.9 10E3/UL (ref 4–11)

## 2024-01-24 PROCEDURE — G0439 PPPS, SUBSEQ VISIT: HCPCS | Performed by: FAMILY MEDICINE

## 2024-01-24 PROCEDURE — 99214 OFFICE O/P EST MOD 30 MIN: CPT | Mod: 25 | Performed by: FAMILY MEDICINE

## 2024-01-24 PROCEDURE — 84443 ASSAY THYROID STIM HORMONE: CPT | Performed by: FAMILY MEDICINE

## 2024-01-24 PROCEDURE — 85027 COMPLETE CBC AUTOMATED: CPT | Performed by: FAMILY MEDICINE

## 2024-01-24 PROCEDURE — 80053 COMPREHEN METABOLIC PANEL: CPT | Performed by: FAMILY MEDICINE

## 2024-01-24 PROCEDURE — 36415 COLL VENOUS BLD VENIPUNCTURE: CPT | Performed by: FAMILY MEDICINE

## 2024-01-24 PROCEDURE — 80061 LIPID PANEL: CPT | Performed by: FAMILY MEDICINE

## 2024-01-24 PROCEDURE — 82306 VITAMIN D 25 HYDROXY: CPT | Performed by: FAMILY MEDICINE

## 2024-01-24 RX ORDER — ESCITALOPRAM OXALATE 10 MG/1
10 TABLET ORAL DAILY
Qty: 90 TABLET | Refills: 3 | Status: SHIPPED | OUTPATIENT
Start: 2024-01-24

## 2024-01-24 RX ORDER — ESCITALOPRAM OXALATE 5 MG/1
5 TABLET ORAL DAILY
Qty: 90 TABLET | Refills: 3 | Status: SHIPPED | OUTPATIENT
Start: 2024-01-24

## 2024-01-24 RX ORDER — RESPIRATORY SYNCYTIAL VIRUS VACCINE 120MCG/0.5
0.5 KIT INTRAMUSCULAR ONCE
Qty: 1 EACH | Refills: 0 | Status: CANCELLED | OUTPATIENT
Start: 2024-01-24 | End: 2024-01-24

## 2024-01-24 ASSESSMENT — ENCOUNTER SYMPTOMS
SHORTNESS OF BREATH: 0
NAUSEA: 0
COUGH: 0
EYE PAIN: 0
DYSURIA: 0
WEAKNESS: 1
HEMATURIA: 0
PALPITATIONS: 0
MYALGIAS: 1
DIZZINESS: 1
ARTHRALGIAS: 1
JOINT SWELLING: 0
NERVOUS/ANXIOUS: 1
DIARRHEA: 0
CHILLS: 0
FREQUENCY: 1
CONSTIPATION: 1
HEADACHES: 0
SORE THROAT: 0
FEVER: 0
ABDOMINAL PAIN: 0

## 2024-01-24 ASSESSMENT — ACTIVITIES OF DAILY LIVING (ADL): CURRENT_FUNCTION: NO ASSISTANCE NEEDED

## 2024-01-24 NOTE — PROGRESS NOTES
"The patient was counseled and encouraged to consider modifying their diet and eating habits. She was provided with information on recommended healthy diet options.  Information on urinary incontinence and treatment options given to patient.  The patient's PHQ-9 score is consistent with mild depression. She was provided with information regarding depression and was advised to schedule a follow up appointment in 12 weeks to further address this issue if needed.  Answers submitted by the patient for this visit:  Patient Health Questionnaire (Submitted on 1/23/2024)  If you checked off any problems, how difficult have these problems made it for you to do your work, take care of things at home, or get along with other people?: Not difficult at all  PHQ9 TOTAL SCORE: 8  COURTNEY-7 (Submitted on 1/23/2024)  COURTNEY 7 TOTAL SCORE: 5  Annual Preventive Visit (Submitted on 1/17/2024)  Chief Complaint: Annual Exam:  In general, how would you rate your overall physical health?: good  Frequency of exercise:: 4-5 days/week  Do you usually eat at least 4 servings of fruit and vegetables a day, include whole grains & fiber, and avoid regularly eating high fat or \"junk\" foods? : No  Taking medications regularly:: Yes  Medication side effects:: None  Activities of Daily Living: no assistance needed  Home safety: no safety concerns identified  Hearing Impairment:: no hearing concerns  In the past 6 months, have you been bothered by leaking of urine?: Yes  abdominal pain: No  Blood in stool: No  Blood in urine: No  chest pain: No  chills: No  congestion: No  constipation: Yes  cough: No  diarrhea: No  dizziness: Yes  ear pain: No  eye pain: No  nervous/anxious: Yes  fever: No  frequency: Yes  genital sores: No  headaches: No  hearing loss: No  heartburn: No  arthralgias: Yes  joint swelling: No  peripheral edema: No  mood changes: No  myalgias: Yes  nausea: No  dysuria: No  palpitations: No  Skin sensation changes: No  sore throat: No  urgency: " No  rash: No  shortness of breath: No  visual disturbance: No  weakness: Yes  pelvic pain: No  vaginal bleeding: No  vaginal discharge: Yes  tenderness: No  breast mass: No  breast discharge: No  In general, how would you rate your overall mental or emotional health?: good  Additional concerns today:: No  Exercise outside of work (Submitted on 1/17/2024)  Chief Complaint: Annual Exam:  Duration of exercise:: 15-30 minutes
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
## 2024-01-24 NOTE — PATIENT INSTRUCTIONS
Increase Lexapro to 15 mg daily.    Please be safe.    Please look into counseling through the top and send her.    If you are interested in the new shingles shot, Shingrix, please check with insurance for coverage either in clinic or at a pharmacy. It is a 2 shot series 2-6 months apart.     Can get the RSV shot at a pharmacy.    Printed Kegel exercises.  If urinary leakage persists, let me know and we will refer to Urology.    Will renew handicap parking permit.      Can see Ortho for your IT band syndrome.      Can see the spine clinic for spinal stenosis.    Can take Tylenol up to 3000 mg in a day.    Can use gabapentin 300 mg up to 3 times a day but if it makes you too sleepy a lot of people just take it at bedtime.    Glad to see dermatology routinely.    Patient Education   Personalized Prevention Plan  You are due for the preventive services outlined below.  Your care team is available to assist you in scheduling these services.  If you have already completed any of these items, please share that information with your care team to update in your medical record.      Health Maintenance   Topic Date Due    DEXA  Declined    ZOSTER IMMUNIZATION (1 of 2) If you are interested in the new shingles shot, Shingrix, please check with insurance for coverage either in clinic or at a pharmacy. It is a 2 shot series 2-6 months apart.     LUNG CANCER SCREENING  Today    RSV VACCINE (Pregnancy & 60+) (1 - 1-dose 60+ series) Can get at the pharmacy    COLORECTAL CANCER SCREENING  Declined today    ANNUAL REVIEW OF HM ORDERS  Today    MEDICARE ANNUAL WELLNESS VISIT  Today    FALL RISK ASSESSMENT  Today    DTAP/TDAP/TD IMMUNIZATION (2 - Td or Tdap) 08/24/2025    MAMMO SCREENING  09/22/2024    LIPID  Today    ADVANCE CARE PLANNING  Declined    HEPATITIS C SCREENING  Completed    PHQ-2 (once per calendar year)  Completed    PAP FOLLOW-UP  Completed    HPV FOLLOW-UP  Completed    INFLUENZA VACCINE  Completed    Pneumococcal  Vaccine: 65+ Years  Completed    COVID-19 Vaccine  Completed    IPV IMMUNIZATION  Aged Out    HPV IMMUNIZATION  Aged Out    MENINGITIS IMMUNIZATION  Aged Out    RSV MONOCLONAL ANTIBODY  Aged Out       Learning About Dietary Guidelines  What are the Dietary Guidelines for Americans?     Dietary Guidelines for Americans provide tips for eating well and staying healthy. This helps reduce the risk for long-term (chronic) diseases.  These guidelines recommend that you:  Eat and drink the right amount for you. The U.S. government's food guide is called MyPlate. It can help you make your own well-balanced eating plan.  Try to balance your eating with your activity. This helps you stay at a healthy weight.  Drink alcohol in moderation, if at all.  Limit foods high in salt, saturated fat, trans fat, and added sugar.  These guidelines are from the U.S. Department of Agriculture and the U.S. Department of Health and Human Services. They are updated every 5 years.  What is MyPlate?  MyPlate is the U.[place] [employer]'s food guide. It can help you make your own well-balanced eating plan. A balanced eating plan means that you eat enough, but not too much, and that your food gives you the nutrients you need to stay healthy.  MyPlate focuses on eating plenty of whole grains, fruits, and vegetables, and on limiting fat and sugar. It is available online at www.ChooseMyPlate.gov.  How can you get started?  If you're trying to eat healthier, you can slowly change your eating habits over time. You don't have to make big changes all at once. Start by adding one or two healthy foods to your meals each day.  Grains  Choose whole-grain breads and cereals and whole-wheat pasta and whole-grain crackers.  Vegetables  Eat a variety of vegetables every day. They have lots of nutrients and are part of a heart-healthy diet.  Fruits  Eat a variety of fruits every day. Fruits contain lots of nutrients. Choose fresh fruit instead of fruit juice.  Protein  "foods  Choose fish and lean poultry more often. Eat red meat and fried meats less often. Dried beans, tofu, and nuts are also good sources of protein.  Dairy  Choose low-fat or fat-free products from this food group. If you have problems digesting milk, try eating cheese or yogurt instead.  Fats and oils  Limit fats and oils if you're trying to cut calories. Choose healthy fats when you cook. These include canola oil and olive oil.  Where can you learn more?  Go to https://www.Mailsuite.net/patiented  Enter D676 in the search box to learn more about \"Learning About Dietary Guidelines.\"  Current as of: February 28, 2023               Content Version: 13.8    4182-4247 [employer].   Care instructions adapted under license by your healthcare professional. If you have questions about a medical condition or this instruction, always ask your healthcare professional. [employer] disclaims any warranty or liability for your use of this information.      Bladder Training: Care Instructions  Your Care Instructions     Bladder training is used to treat urge incontinence and stress incontinence. Urge incontinence means that the need to urinate comes on so fast that you can't get to a toilet in time. Stress incontinence means that you leak urine because of pressure on your bladder. For example, it may happen when you laugh, cough, or lift something heavy.  Bladder training can increase how long you can wait before you have to urinate. It can also help your bladder hold more urine. And it can give you better control over the urge to urinate.  It is important to remember that bladder training takes a few weeks to a few months to make a difference. You may not see results right away, but don't give up.  Follow-up care is a key part of your treatment and safety. Be sure to make and go to all appointments, and call your doctor if you are having problems. It's also a good idea to know your test results and " keep a list of the medicines you take.  How can you care for yourself at home?  Work with your doctor to come up with a bladder training program that is right for you. You may use one or more of the following methods.  Delayed urination  In the beginning, try to keep from urinating for 5 minutes after you first feel the need to go.  While you wait, take deep, slow breaths to relax. Kegel exercises can also help you delay the need to go to the bathroom.  After some practice, when you can easily wait 5 minutes to urinate, try to wait 10 minutes before you urinate.  Slowly increase the waiting period until you are able to control when you have to urinate.  Scheduled urination  Empty your bladder when you first wake up in the morning.  Schedule times throughout the day when you will urinate.  Start by going to the bathroom every hour, even if you don't need to go.  Slowly increase the time between trips to the bathroom.  When you have found a schedule that works well for you, keep doing it.  If you wake up during the night and have to urinate, do it. Apply your schedule to waking hours only.  Kegel exercises  These tighten and strengthen pelvic muscles, which can help you control the flow of urine. (If doing these exercises causes pain, stop doing them and talk with your doctor.) To do Kegel exercises:  Squeeze your muscles as if you were trying not to pass gas. Or squeeze your muscles as if you were stopping the flow of urine. Your belly, legs, and buttocks shouldn't move.  Hold the squeeze for 3 seconds, then relax for 5 to 10 seconds.  Start with 3 seconds, then add 1 second each week until you are able to squeeze for 10 seconds.  Repeat the exercise 10 times a session. Do 3 to 8 sessions a day.  When should you call for help?  Watch closely for changes in your health, and be sure to contact your doctor if:    Your incontinence is getting worse.     You do not get better as expected.   Where can you learn more?  Go  "to https://www.iWitness.net/patiented  Enter V684 in the search box to learn more about \"Bladder Training: Care Instructions.\"  Current as of: February 28, 2023               Content Version: 13.8    6269-9450 [employer].   Care instructions adapted under license by your healthcare professional. If you have questions about a medical condition or this instruction, always ask your healthcare professional. [employer] disclaims any warranty or liability for your use of this information.      Learning About Depression Screening  What is depression screening?  Depression screening is a way to see if you have depression symptoms. It may be done by a doctor or counselor. It's often part of a routine checkup. That's because your mental health is just as important as your physical health.  Depression is a mental health condition that affects how you feel, think, and act. You may:  Have less energy.  Lose interest in your daily activities.  Feel sad and grouchy for a long time.  Depression is very common. It affects people of all ages.  Many things can lead to depression. Some people become depressed after they have a stroke or find out they have a major illness like cancer or heart disease. The death of a loved one or a breakup may lead to depression. It can run in families. Most experts believe that a combination of inherited genes and stressful life events can cause it.  What happens during screening?  You may be asked to fill out a form about your depression symptoms. You and the doctor will discuss your answers. The doctor may ask you more questions to learn more about how you think, act, and feel.  What happens after screening?  If you have symptoms of depression, your doctor will talk to you about your options.  Doctors usually treat depression with medicines or counseling. Often, combining the two works best. Many people don't get help because they think that they'll get over the " "depression on their own. But people with depression may not get better unless they get treatment.  The cause of depression is not well understood. There may be many factors involved. But if you have depression, it's not your fault.  A serious symptom of depression is thinking about death or suicide. If you or someone you care about talks about this or about feeling hopeless, get help right away.  It's important to know that depression can be treated. Medicine, counseling, and self-care may help.  Where can you learn more?  Go to https://www.[employer].net/patiented  Enter T185 in the search box to learn more about \"Learning About Depression Screening.\"  Current as of: June 25, 2023               Content Version: 13.8    3753-4169 [employer].   Care instructions adapted under license by your healthcare professional. If you have questions about a medical condition or this instruction, always ask your healthcare professional. [employer], [employer] disclaims any warranty or liability for your use of this information.         "

## 2024-01-25 LAB
ALBUMIN SERPL BCG-MCNC: 4.3 G/DL (ref 3.5–5.2)
ALP SERPL-CCNC: 134 U/L (ref 40–150)
ALT SERPL W P-5'-P-CCNC: 28 U/L (ref 0–50)
ANION GAP SERPL CALCULATED.3IONS-SCNC: 12 MMOL/L (ref 7–15)
AST SERPL W P-5'-P-CCNC: 34 U/L (ref 0–45)
BILIRUB SERPL-MCNC: 0.6 MG/DL
BUN SERPL-MCNC: 19 MG/DL (ref 8–23)
CALCIUM SERPL-MCNC: 9.6 MG/DL (ref 8.8–10.2)
CHLORIDE SERPL-SCNC: 104 MMOL/L (ref 98–107)
CHOLEST SERPL-MCNC: 163 MG/DL
CREAT SERPL-MCNC: 0.89 MG/DL (ref 0.51–0.95)
DEPRECATED HCO3 PLAS-SCNC: 27 MMOL/L (ref 22–29)
EGFRCR SERPLBLD CKD-EPI 2021: 69 ML/MIN/1.73M2
FASTING STATUS PATIENT QL REPORTED: YES
GLUCOSE SERPL-MCNC: 90 MG/DL (ref 70–99)
HDLC SERPL-MCNC: 70 MG/DL
LDLC SERPL CALC-MCNC: 81 MG/DL
NONHDLC SERPL-MCNC: 93 MG/DL
POTASSIUM SERPL-SCNC: 4 MMOL/L (ref 3.4–5.3)
PROT SERPL-MCNC: 6.9 G/DL (ref 6.4–8.3)
SODIUM SERPL-SCNC: 143 MMOL/L (ref 135–145)
TRIGL SERPL-MCNC: 60 MG/DL
TSH SERPL DL<=0.005 MIU/L-ACNC: 2.52 UIU/ML (ref 0.3–4.2)
VIT D+METAB SERPL-MCNC: 38 NG/ML (ref 20–50)

## 2024-01-26 ENCOUNTER — PATIENT OUTREACH (OUTPATIENT)
Dept: GASTROENTEROLOGY | Facility: CLINIC | Age: 74
End: 2024-01-26
Payer: MEDICARE

## 2024-01-26 DIAGNOSIS — Z12.11 SPECIAL SCREENING FOR MALIGNANT NEOPLASMS, COLON: Primary | ICD-10-CM

## 2024-01-26 NOTE — PROGRESS NOTES
"CRC Screening Colonoscopy Referral Review    Patient meets the inclusion criteria for screening colonoscopy standing order.    Ordering/Referring Provider:  Dr. BRODERICK Aguilar    BMI: Estimated body mass index is 28.61 kg/m  as calculated from the following:    Height as of 1/24/24: 1.695 m (5' 6.73\").    Weight as of 1/24/24: 82.2 kg (181 lb 3.2 oz).     Sedation:  Does patient have any of the following conditions affecting sedation?  Hx of severe PTSD, anxiety, or psychosis: MAC sedation recommended    Previous Scopes:  Any previous recommendations or follow up needs based on previous scope?  na / No recommendations.    Medical Concerns to Postpone Order:  Does patient have any of the following medical concerns that should postpone/delay colonoscopy referral?  No medical conditions affecting colonoscopy referral.    Final Referral Details:  Based on patient's medical history patient is appropriate for referral order with MAC/deep sedation.   BMI<= 45 45 < BMI <= 48 48 < BMI < = 50  BMI > 50   No Restrictions No [facility]  [facility]  [facility] with exceptions Hospital Only OR Only     "

## 2024-01-28 DIAGNOSIS — E78.5 HYPERLIPIDEMIA LDL GOAL <130: ICD-10-CM

## 2024-01-29 RX ORDER — ATORVASTATIN CALCIUM 20 MG/1
TABLET, FILM COATED ORAL
Qty: 135 TABLET | Refills: 2 | Status: SHIPPED | OUTPATIENT
Start: 2024-01-29 | End: 2024-06-20

## 2024-02-08 ENCOUNTER — E-VISIT (OUTPATIENT)
Dept: FAMILY MEDICINE | Facility: CLINIC | Age: 74
End: 2024-02-08
Payer: MEDICARE

## 2024-02-08 DIAGNOSIS — R19.7 DIARRHEA, UNSPECIFIED TYPE: Primary | ICD-10-CM

## 2024-02-08 PROCEDURE — 99421 OL DIG E/M SVC 5-10 MIN: CPT | Performed by: FAMILY MEDICINE

## 2024-02-08 RX ORDER — LOPERAMIDE HYDROCHLORIDE 2 MG/1
2 TABLET ORAL 4 TIMES DAILY PRN
Qty: 40 TABLET | Refills: 3 | Status: SHIPPED | OUTPATIENT
Start: 2024-02-08 | End: 2024-02-29

## 2024-02-26 DIAGNOSIS — I10 ESSENTIAL HYPERTENSION, BENIGN: ICD-10-CM

## 2024-02-27 RX ORDER — POTASSIUM CHLORIDE 1500 MG/1
TABLET, EXTENDED RELEASE ORAL
Qty: 90 TABLET | Refills: 0 | Status: SHIPPED | OUTPATIENT
Start: 2024-02-27 | End: 2024-06-18

## 2024-02-29 ENCOUNTER — MYC REFILL (OUTPATIENT)
Dept: FAMILY MEDICINE | Facility: CLINIC | Age: 74
End: 2024-02-29
Payer: MEDICARE

## 2024-02-29 DIAGNOSIS — R19.7 DIARRHEA, UNSPECIFIED TYPE: ICD-10-CM

## 2024-02-29 RX ORDER — LOPERAMIDE HYDROCHLORIDE 2 MG/1
2 TABLET ORAL 4 TIMES DAILY PRN
Qty: 40 TABLET | Refills: 3 | Status: SHIPPED | OUTPATIENT
Start: 2024-02-29

## 2024-03-18 DIAGNOSIS — R52 PAIN: ICD-10-CM

## 2024-03-19 RX ORDER — IBUPROFEN 800 MG/1
TABLET, FILM COATED ORAL
Qty: 90 TABLET | Refills: 0 | Status: SHIPPED | OUTPATIENT
Start: 2024-03-19 | End: 2024-05-15

## 2024-05-15 ENCOUNTER — MYC REFILL (OUTPATIENT)
Dept: FAMILY MEDICINE | Facility: CLINIC | Age: 74
End: 2024-05-15
Payer: MEDICARE

## 2024-05-15 DIAGNOSIS — R52 PAIN: ICD-10-CM

## 2024-05-15 RX ORDER — IBUPROFEN 800 MG/1
TABLET, FILM COATED ORAL
Qty: 90 TABLET | Refills: 0 | Status: SHIPPED | OUTPATIENT
Start: 2024-05-15 | End: 2024-05-15

## 2024-05-15 RX ORDER — IBUPROFEN 800 MG/1
800 TABLET, FILM COATED ORAL EVERY 8 HOURS PRN
Qty: 90 TABLET | Refills: 3 | Status: SHIPPED | OUTPATIENT
Start: 2024-05-15

## 2024-05-15 RX ORDER — IBUPROFEN 800 MG/1
TABLET, FILM COATED ORAL
Qty: 90 TABLET | Refills: 0 | OUTPATIENT
Start: 2024-05-15

## 2024-06-18 DIAGNOSIS — I10 ESSENTIAL HYPERTENSION, BENIGN: ICD-10-CM

## 2024-06-18 RX ORDER — POTASSIUM CHLORIDE 1500 MG/1
TABLET, EXTENDED RELEASE ORAL
Qty: 90 TABLET | Refills: 1 | Status: SHIPPED | OUTPATIENT
Start: 2024-06-18

## 2024-06-19 ENCOUNTER — MYC REFILL (OUTPATIENT)
Dept: CARDIOLOGY | Facility: CLINIC | Age: 74
End: 2024-06-19
Payer: MEDICARE

## 2024-06-19 ENCOUNTER — TELEPHONE (OUTPATIENT)
Dept: CARDIOLOGY | Facility: CLINIC | Age: 74
End: 2024-06-19
Payer: MEDICARE

## 2024-06-19 DIAGNOSIS — I10 ESSENTIAL HYPERTENSION, BENIGN: ICD-10-CM

## 2024-06-19 RX ORDER — HYDROCHLOROTHIAZIDE 12.5 MG/1
12.5 TABLET ORAL DAILY
Qty: 90 TABLET | Refills: 1 | Status: SHIPPED | OUTPATIENT
Start: 2024-06-19

## 2024-06-19 NOTE — TELEPHONE ENCOUNTER
"Last seen 7/11/23 - rec follow-up \"if needed.\"  LVM to call to discuss - needs follow-up visit, or request med from PCP.  -ral        "

## 2024-06-20 ENCOUNTER — MYC REFILL (OUTPATIENT)
Dept: FAMILY MEDICINE | Facility: CLINIC | Age: 74
End: 2024-06-20
Payer: MEDICARE

## 2024-06-20 DIAGNOSIS — E78.5 HYPERLIPIDEMIA LDL GOAL <130: ICD-10-CM

## 2024-06-20 RX ORDER — ATORVASTATIN CALCIUM 20 MG/1
TABLET, FILM COATED ORAL
Qty: 135 TABLET | Refills: 1 | Status: SHIPPED | OUTPATIENT
Start: 2024-06-20

## 2024-06-21 ENCOUNTER — TRANSFERRED RECORDS (OUTPATIENT)
Dept: HEALTH INFORMATION MANAGEMENT | Facility: CLINIC | Age: 74
End: 2024-06-21
Payer: MEDICARE

## 2024-08-14 ENCOUNTER — PATIENT OUTREACH (OUTPATIENT)
Dept: CARE COORDINATION | Facility: CLINIC | Age: 74
End: 2024-08-14
Payer: MEDICARE

## 2024-09-30 ENCOUNTER — TELEPHONE (OUTPATIENT)
Dept: FAMILY MEDICINE | Facility: CLINIC | Age: 74
End: 2024-09-30
Payer: MEDICARE

## 2024-09-30 DIAGNOSIS — Z12.83 SCREENING EXAM FOR SKIN CANCER: Primary | ICD-10-CM

## 2024-09-30 NOTE — TELEPHONE ENCOUNTER
General Call      Reason for Call:  patient called and would like a referral from Jeff Horan at [facility]     [facility].    Reason:  Spot on back.    Please contact patient.  Thank you.    What are your questions or concerns:  no    Date of last appointment with provider: Jan 2025    Could we send this information to you in [employer] or would you prefer to receive a phone call?:   Patient would prefer a phone call   Okay to leave a detailed message?: Yes at Cell number on file:    Telephone Information:   Mobile [phone]

## 2024-10-02 NOTE — TELEPHONE ENCOUNTER
Referral faxed to [facility] [facility] location at [phone]. Called and informed patient of referral sent.    Martha Arriola RN

## 2024-10-12 ENCOUNTER — ANCILLARY PROCEDURE (OUTPATIENT)
Dept: MAMMOGRAPHY | Facility: CLINIC | Age: 74
End: 2024-10-12
Attending: FAMILY MEDICINE
Payer: MEDICARE

## 2024-10-12 DIAGNOSIS — Z12.31 VISIT FOR SCREENING MAMMOGRAM: ICD-10-CM

## 2024-10-12 PROCEDURE — 77063 BREAST TOMOSYNTHESIS BI: CPT

## 2024-10-18 ENCOUNTER — ANCILLARY PROCEDURE (OUTPATIENT)
Dept: MAMMOGRAPHY | Facility: CLINIC | Age: 74
End: 2024-10-18
Attending: FAMILY MEDICINE
Payer: MEDICARE

## 2024-10-18 DIAGNOSIS — R92.8 ABNORMAL MAMMOGRAM: ICD-10-CM

## 2024-10-18 PROCEDURE — 77065 DX MAMMO INCL CAD UNI: CPT | Mod: LT

## 2024-11-27 ENCOUNTER — OFFICE VISIT (OUTPATIENT)
Dept: FAMILY MEDICINE | Facility: CLINIC | Age: 74
End: 2024-11-27
Payer: COMMERCIAL

## 2024-11-27 VITALS
SYSTOLIC BLOOD PRESSURE: 154 MMHG | OXYGEN SATURATION: 100 % | RESPIRATION RATE: 16 BRPM | HEART RATE: 69 BPM | DIASTOLIC BLOOD PRESSURE: 91 MMHG | BODY MASS INDEX: 25.18 KG/M2 | WEIGHT: 160.4 LBS | HEIGHT: 67 IN

## 2024-11-27 DIAGNOSIS — R26.89 BALANCE PROBLEMS: ICD-10-CM

## 2024-11-27 DIAGNOSIS — I10 ESSENTIAL HYPERTENSION, BENIGN: ICD-10-CM

## 2024-11-27 DIAGNOSIS — M51.379 DEGENERATION OF INTERVERTEBRAL DISC OF LUMBOSACRAL REGION, UNSPECIFIED WHETHER PAIN PRESENT: ICD-10-CM

## 2024-11-27 DIAGNOSIS — R51.9 NOCTURNAL HEADACHES: ICD-10-CM

## 2024-11-27 DIAGNOSIS — E55.9 VITAMIN D DEFICIENCY: Primary | ICD-10-CM

## 2024-11-27 DIAGNOSIS — Z23 NEED FOR SHINGLES VACCINE: ICD-10-CM

## 2024-11-27 PROCEDURE — 90480 ADMN SARSCOV2 VAC 1/ONLY CMP: CPT | Performed by: FAMILY MEDICINE

## 2024-11-27 PROCEDURE — 90471 IMMUNIZATION ADMIN: CPT | Performed by: FAMILY MEDICINE

## 2024-11-27 PROCEDURE — 99215 OFFICE O/P EST HI 40 MIN: CPT | Mod: 25 | Performed by: FAMILY MEDICINE

## 2024-11-27 PROCEDURE — 90662 IIV NO PRSV INCREASED AG IM: CPT | Performed by: FAMILY MEDICINE

## 2024-11-27 PROCEDURE — 91320 SARSCV2 VAC 30MCG TRS-SUC IM: CPT | Performed by: FAMILY MEDICINE

## 2024-11-27 RX ORDER — METOPROLOL SUCCINATE 25 MG/1
12.5 TABLET, EXTENDED RELEASE ORAL DAILY
Qty: 45 TABLET | Refills: 3 | Status: SHIPPED | OUTPATIENT
Start: 2024-11-27

## 2024-11-27 RX ORDER — GABAPENTIN 300 MG/1
300 CAPSULE ORAL AT BEDTIME
Qty: 90 CAPSULE | Refills: 3 | Status: SHIPPED | OUTPATIENT
Start: 2024-11-27

## 2024-11-27 ASSESSMENT — ENCOUNTER SYMPTOMS: HEADACHES: 1
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
## 2024-11-27 NOTE — PATIENT INSTRUCTIONS
I am going to place a referral to balance physical therapy at [facility], if you are able to get there, but I did print a lot of good general stretching and strengthening exercises.    To strengthen your quad sit in a chair with your back supported.  Hold 1 leg straight out.  Lift it up 10-15 times.  Do that a couple times a day.    Since you do have some history of lumbar disc degeneration and if you are having any back or leg pain I want to refer you to our spine clinic.    Your blood pressure is elevated today at 154/91.  Since you have been out of your metoprolol for a month I am going to have you restart that metoprolol XL 12.5 mg daily. (1/2 tab of the 25 mg).    Continue the hydrochlorothiazide 12.5 mg daily.    Please check your blood pressure in 2 weeks and send a [employer] message.    I am ordering a CT with contrast of your head and neck.  I am also going to ask them if they can image the inner ears.  We need to rule out carotid or vertebral artery dissection.    Radiology should call you but if they do not call [phone] to set that up.  I did order it stat but but if you are not in severe pain if you could set it up for a week from now then that would give your insurance company some time to look into authorization.    Will fill out handicap parking permit for you, you need to fill out your portion and bring it to the DMV.    Try to get in 2000 kristin a day but if weight loss persists we need to work that up.    Flu and COVID shots today.    Can resume gabapentin 300 mg at bedtime.

## 2024-11-30 ENCOUNTER — HOSPITAL ENCOUNTER (OUTPATIENT)
Dept: CT IMAGING | Facility: HOSPITAL | Age: 74
Discharge: HOME | End: 2024-11-30
Attending: FAMILY MEDICINE | Admitting: FAMILY MEDICINE
Payer: MEDICARE

## 2024-11-30 DIAGNOSIS — R51.9 NOCTURNAL HEADACHES: ICD-10-CM

## 2024-11-30 LAB
CREAT BLD-MCNC: 0.9 MG/DL (ref 0.6–1.1)
EGFRCR SERPLBLD CKD-EPI 2021: >60 ML/MIN/1.73M2

## 2024-11-30 PROCEDURE — 82565 ASSAY OF CREATININE: CPT

## 2024-11-30 PROCEDURE — 250N000011 HC RX IP 250 OP 636: Performed by: FAMILY MEDICINE

## 2024-11-30 PROCEDURE — 70496 CT ANGIOGRAPHY HEAD: CPT | Mod: ME

## 2024-11-30 RX ORDER — IOPAMIDOL 755 MG/ML
75 INJECTION, SOLUTION INTRAVASCULAR ONCE
Status: COMPLETED | OUTPATIENT
Start: 2024-11-30 | End: 2024-11-30

## 2024-11-30 RX ADMIN — IOPAMIDOL 75 ML: 755 INJECTION, SOLUTION INTRAVENOUS at 12:12

## 2025-01-03 DIAGNOSIS — I10 ESSENTIAL HYPERTENSION, BENIGN: ICD-10-CM

## 2025-01-06 RX ORDER — HYDROCHLOROTHIAZIDE 12.5 MG/1
12.5 TABLET ORAL DAILY
Qty: 90 TABLET | Refills: 1 | Status: SHIPPED | OUTPATIENT
Start: 2025-01-06

## 2025-01-06 RX ORDER — POTASSIUM CHLORIDE 1500 MG/1
TABLET, EXTENDED RELEASE ORAL
Qty: 90 TABLET | Refills: 1 | Status: SHIPPED | OUTPATIENT
Start: 2025-01-06

## 2025-01-20 DIAGNOSIS — F41.1 ANXIETY STATE: ICD-10-CM

## 2025-01-20 RX ORDER — ESCITALOPRAM OXALATE 5 MG/1
5 TABLET ORAL DAILY
Qty: 90 TABLET | Refills: 3 | Status: SHIPPED | OUTPATIENT
Start: 2025-01-20

## 2025-01-25 SDOH — HEALTH STABILITY: PHYSICAL HEALTH: ON AVERAGE, HOW MANY DAYS PER WEEK DO YOU ENGAGE IN MODERATE TO STRENUOUS EXERCISE (LIKE A BRISK WALK)?: 2 DAYS

## 2025-01-25 SDOH — HEALTH STABILITY: PHYSICAL HEALTH: ON AVERAGE, HOW MANY MINUTES DO YOU ENGAGE IN EXERCISE AT THIS LEVEL?: 10 MIN

## 2025-01-25 ASSESSMENT — SOCIAL DETERMINANTS OF HEALTH (SDOH): HOW OFTEN DO YOU GET TOGETHER WITH FRIENDS OR RELATIVES?: MORE THAN THREE TIMES A WEEK

## 2025-01-30 ENCOUNTER — OFFICE VISIT (OUTPATIENT)
Dept: FAMILY MEDICINE | Facility: CLINIC | Age: 75
End: 2025-01-30
Attending: FAMILY MEDICINE
Payer: COMMERCIAL

## 2025-01-30 VITALS
HEIGHT: 67 IN | DIASTOLIC BLOOD PRESSURE: 82 MMHG | TEMPERATURE: 98 F | BODY MASS INDEX: 26.59 KG/M2 | RESPIRATION RATE: 16 BRPM | WEIGHT: 169.4 LBS | OXYGEN SATURATION: 95 % | SYSTOLIC BLOOD PRESSURE: 137 MMHG | HEART RATE: 53 BPM

## 2025-01-30 DIAGNOSIS — I10 ESSENTIAL HYPERTENSION, BENIGN: ICD-10-CM

## 2025-01-30 DIAGNOSIS — R52 PAIN: ICD-10-CM

## 2025-01-30 DIAGNOSIS — E55.9 VITAMIN D DEFICIENCY: ICD-10-CM

## 2025-01-30 DIAGNOSIS — R09.82 POST-NASAL DRIP: ICD-10-CM

## 2025-01-30 DIAGNOSIS — E78.5 HYPERLIPIDEMIA LDL GOAL <130: ICD-10-CM

## 2025-01-30 DIAGNOSIS — F41.9 ANXIETY: ICD-10-CM

## 2025-01-30 DIAGNOSIS — M48.061 SPINAL STENOSIS OF LUMBAR REGION, UNSPECIFIED WHETHER NEUROGENIC CLAUDICATION PRESENT: ICD-10-CM

## 2025-01-30 DIAGNOSIS — Z23 NEED FOR SHINGLES VACCINE: ICD-10-CM

## 2025-01-30 DIAGNOSIS — Z00.00 ENCOUNTER FOR ANNUAL WELLNESS EXAM IN MEDICARE PATIENT: Primary | ICD-10-CM

## 2025-01-30 DIAGNOSIS — M51.379 DEGENERATION OF INTERVERTEBRAL DISC OF LUMBOSACRAL REGION, UNSPECIFIED WHETHER PAIN PRESENT: ICD-10-CM

## 2025-01-30 LAB
ALBUMIN SERPL BCG-MCNC: 4.2 G/DL (ref 3.5–5.2)
ALP SERPL-CCNC: 112 U/L (ref 40–150)
ALT SERPL W P-5'-P-CCNC: 16 U/L (ref 0–50)
ANION GAP SERPL CALCULATED.3IONS-SCNC: 8 MMOL/L (ref 7–15)
AST SERPL W P-5'-P-CCNC: 24 U/L (ref 0–45)
BILIRUB SERPL-MCNC: 0.4 MG/DL
BUN SERPL-MCNC: 27.1 MG/DL (ref 8–23)
CALCIUM SERPL-MCNC: 9.8 MG/DL (ref 8.8–10.4)
CHLORIDE SERPL-SCNC: 103 MMOL/L (ref 98–107)
CHOLEST SERPL-MCNC: 178 MG/DL
CREAT SERPL-MCNC: 0.88 MG/DL (ref 0.51–0.95)
EGFRCR SERPLBLD CKD-EPI 2021: 69 ML/MIN/1.73M2
ERYTHROCYTE [DISTWIDTH] IN BLOOD BY AUTOMATED COUNT: 13.4 % (ref 10–15)
FASTING STATUS PATIENT QL REPORTED: YES
FASTING STATUS PATIENT QL REPORTED: YES
GLUCOSE SERPL-MCNC: 98 MG/DL (ref 70–99)
HCO3 SERPL-SCNC: 29 MMOL/L (ref 22–29)
HCT VFR BLD AUTO: 38 % (ref 35–47)
HDLC SERPL-MCNC: 85 MG/DL
HGB BLD-MCNC: 12.4 G/DL (ref 11.7–15.7)
LDLC SERPL CALC-MCNC: 83 MG/DL
MCH RBC QN AUTO: 29.3 PG (ref 26.5–33)
MCHC RBC AUTO-ENTMCNC: 32.6 G/DL (ref 31.5–36.5)
MCV RBC AUTO: 90 FL (ref 78–100)
NONHDLC SERPL-MCNC: 93 MG/DL
PLATELET # BLD AUTO: 234 10E3/UL (ref 150–450)
POTASSIUM SERPL-SCNC: 4.4 MMOL/L (ref 3.4–5.3)
PROT SERPL-MCNC: 6.7 G/DL (ref 6.4–8.3)
RBC # BLD AUTO: 4.23 10E6/UL (ref 3.8–5.2)
SODIUM SERPL-SCNC: 140 MMOL/L (ref 135–145)
TRIGL SERPL-MCNC: 48 MG/DL
TSH SERPL DL<=0.005 MIU/L-ACNC: 4 UIU/ML (ref 0.3–4.2)
VIT D+METAB SERPL-MCNC: 35 NG/ML (ref 20–50)
WBC # BLD AUTO: 3.4 10E3/UL (ref 4–11)

## 2025-01-30 RX ORDER — ESCITALOPRAM OXALATE 10 MG/1
10 TABLET ORAL DAILY
Qty: 90 TABLET | Refills: 3 | Status: CANCELLED | OUTPATIENT
Start: 2025-01-30

## 2025-01-30 RX ORDER — ESCITALOPRAM OXALATE 20 MG/1
20 TABLET ORAL DAILY
Qty: 90 TABLET | Refills: 3 | Status: SHIPPED | OUTPATIENT
Start: 2025-01-30

## 2025-01-30 RX ORDER — ATORVASTATIN CALCIUM 20 MG/1
TABLET, FILM COATED ORAL
Qty: 135 TABLET | Refills: 1 | Status: SHIPPED | OUTPATIENT
Start: 2025-01-30

## 2025-01-30 RX ORDER — IBUPROFEN 800 MG/1
800 TABLET, FILM COATED ORAL EVERY 8 HOURS PRN
Qty: 90 TABLET | Refills: 3 | Status: SHIPPED | OUTPATIENT
Start: 2025-01-30

## 2025-01-30 RX ORDER — FLUTICASONE PROPIONATE 50 MCG
1 SPRAY, SUSPENSION (ML) NASAL DAILY PRN
Qty: 48 G | Refills: 3 | Status: SHIPPED | OUTPATIENT
Start: 2025-01-30
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
## 2025-01-30 NOTE — PATIENT INSTRUCTIONS
I am glad you are using your cane.  Continue balance PT at home and let me know if you would like formal balance physical therapy with the physical therapist.    Seeing ortho for IT band issues.    Handicap additional form done.    Spine clinic referred you wish for lumbar disc degeneration and spinal stenosis.    If you wish a referral to [facility] for the physical therapy of done for your back in the past please let me know.    I am increasing the Escitalopram up to 20 mg daily to take in the morning for mood.    I feel counseling would be helpful and if you are agreeable, please let me know.      Health Maintenance   Topic Date Due    LUCIANO  Declined    ZOSTER IMMUNIZATION (1 of 2) If you are interested in the new shingles shot, Shingrix, please check with insurance for coverage either in clinic or at a pharmacy. It is a 2 shot series 2-6 months apart.     LUNG CANCER SCREENING  Quit 20-25 years ago    COLORECTAL CANCER SCREENING  Declined    BMP  Today    LIPID  Today    ANNUAL REVIEW OF HM ORDERS  Today    MEDICARE ANNUAL WELLNESS VISIT  Today    DTAP/TDAP/TD IMMUNIZATION (2 - Td or Tdap) 08/24/2025, can get at your pharmacy    FALL RISK ASSESSMENT  Today    MAMMO SCREENING  10/18/2025    GLUCOSE  Today    RSV VACCINE (1 - 1-dose 75+ series) Can get at your pharmacy    ADVANCE CARE PLANNING  Packet given    HEPATITIS C SCREENING  Completed    PHQ-2 (once per calendar year)  Completed    PAP FOLLOW-UP  Completed    HPV FOLLOW-UP  Completed    INFLUENZA VACCINE  Completed    Pneumococcal Vaccine: 50+ Years  Completed    COVID-19 Vaccine  Completed    HPV IMMUNIZATION  Aged Out    MENINGITIS IMMUNIZATION  Aged Out    RSV MONOCLONAL ANTIBODY  Aged Out

## 2025-03-06 ENCOUNTER — MYC MEDICAL ADVICE (OUTPATIENT)
Dept: FAMILY MEDICINE | Facility: CLINIC | Age: 75
End: 2025-03-06
Payer: MEDICARE

## 2025-03-06 DIAGNOSIS — F41.9 ANXIETY: ICD-10-CM

## 2025-03-06 RX ORDER — ESCITALOPRAM OXALATE 10 MG/1
115 TABLET ORAL DAILY
COMMUNITY
End: 2025-03-06

## 2025-03-06 RX ORDER — ESCITALOPRAM OXALATE 10 MG/1
15 TABLET ORAL DAILY
COMMUNITY

## 2025-05-16 ASSESSMENT — ANXIETY QUESTIONNAIRES
8. IF YOU CHECKED OFF ANY PROBLEMS, HOW DIFFICULT HAVE THESE MADE IT FOR YOU TO DO YOUR WORK, TAKE CARE OF THINGS AT HOME, OR GET ALONG WITH OTHER PEOPLE?: SOMEWHAT DIFFICULT
4. TROUBLE RELAXING: SEVERAL DAYS
IF YOU CHECKED OFF ANY PROBLEMS ON THIS QUESTIONNAIRE, HOW DIFFICULT HAVE THESE PROBLEMS MADE IT FOR YOU TO DO YOUR WORK, TAKE CARE OF THINGS AT HOME, OR GET ALONG WITH OTHER PEOPLE: SOMEWHAT DIFFICULT
GAD7 TOTAL SCORE: 9
7. FEELING AFRAID AS IF SOMETHING AWFUL MIGHT HAPPEN: SEVERAL DAYS
6. BECOMING EASILY ANNOYED OR IRRITABLE: SEVERAL DAYS
1. FEELING NERVOUS, ANXIOUS, OR ON EDGE: MORE THAN HALF THE DAYS
3. WORRYING TOO MUCH ABOUT DIFFERENT THINGS: MORE THAN HALF THE DAYS
GAD7 TOTAL SCORE: 9
7. FEELING AFRAID AS IF SOMETHING AWFUL MIGHT HAPPEN: SEVERAL DAYS
GAD7 TOTAL SCORE: 9
2. NOT BEING ABLE TO STOP OR CONTROL WORRYING: MORE THAN HALF THE DAYS
5. BEING SO RESTLESS THAT IT IS HARD TO SIT STILL: NOT AT ALL

## 2025-05-21 ENCOUNTER — OFFICE VISIT (OUTPATIENT)
Dept: FAMILY MEDICINE | Facility: CLINIC | Age: 75
End: 2025-05-21
Payer: COMMERCIAL

## 2025-05-21 VITALS
BODY MASS INDEX: 28.53 KG/M2 | SYSTOLIC BLOOD PRESSURE: 142 MMHG | RESPIRATION RATE: 16 BRPM | HEART RATE: 65 BPM | DIASTOLIC BLOOD PRESSURE: 78 MMHG | HEIGHT: 67 IN | WEIGHT: 181.8 LBS | OXYGEN SATURATION: 98 % | TEMPERATURE: 98 F

## 2025-05-21 DIAGNOSIS — F41.9 ANXIETY: ICD-10-CM

## 2025-05-21 DIAGNOSIS — M25.512 ACUTE PAIN OF BOTH SHOULDERS: Primary | ICD-10-CM

## 2025-05-21 DIAGNOSIS — K29.00 ACUTE GASTRITIS WITHOUT HEMORRHAGE, UNSPECIFIED GASTRITIS TYPE: ICD-10-CM

## 2025-05-21 DIAGNOSIS — E78.5 HYPERLIPIDEMIA LDL GOAL <130: ICD-10-CM

## 2025-05-21 DIAGNOSIS — M25.511 ACUTE PAIN OF BOTH SHOULDERS: Primary | ICD-10-CM

## 2025-05-21 PROCEDURE — 99214 OFFICE O/P EST MOD 30 MIN: CPT | Performed by: FAMILY MEDICINE

## 2025-05-21 PROCEDURE — 3078F DIAST BP <80 MM HG: CPT | Performed by: FAMILY MEDICINE

## 2025-05-21 PROCEDURE — 3077F SYST BP >= 140 MM HG: CPT | Performed by: FAMILY MEDICINE

## 2025-05-21 PROCEDURE — G2211 COMPLEX E/M VISIT ADD ON: HCPCS | Performed by: FAMILY MEDICINE

## 2025-05-21 RX ORDER — ESCITALOPRAM OXALATE 5 MG/1
5 TABLET ORAL DAILY
Qty: 90 TABLET | Refills: 3 | Status: SHIPPED | OUTPATIENT
Start: 2025-05-21

## 2025-05-21 RX ORDER — FAMOTIDINE 20 MG/1
20 TABLET, FILM COATED ORAL 2 TIMES DAILY
Qty: 180 TABLET | Refills: 3 | Status: SHIPPED | OUTPATIENT
Start: 2025-05-21

## 2025-05-21 RX ORDER — ATORVASTATIN CALCIUM 20 MG/1
TABLET, FILM COATED ORAL
Status: SHIPPED
Start: 2025-05-21

## 2025-05-21 RX ORDER — LANSOPRAZOLE 30 MG/1
30 CAPSULE, DELAYED RELEASE ORAL DAILY
Qty: 90 CAPSULE | Refills: 3 | Status: SHIPPED | OUTPATIENT
Start: 2025-05-21

## 2025-05-21 NOTE — PATIENT INSTRUCTIONS
I am not convinced that the shoulder pain is caused by the escitalopram or Lexapro because it did not improve with your dose decrease to 10 mg.    I am very glad the mood is doing well and we will decrease the S-Citalopram down to 5 mg.  If you feel you need to increase that to 10 mg please let me know.     [facility] consult for bilateral shoulder pain.   Please call them and see if you can get in the same day as your appointment for your IT band.  Please ask if you can have bilateral shoulder steroid injections on that day.  Or you could start with the right shoulder.    Seeing [facility] for IT band.    When somebody has pain in the mid upper abdomen it could be gastritis or extra acid in the stomach.  Try to reduce or not take ibuprofen type meds at all if possible.  Limit caffeine and alcohol.    I am prescribing Pepcid which is famotidine 20 mg twice a day.    For now continue Prevacid or lansoprazole 30 mg daily.  If after a week  (on it for 2 weeks total) with the Pepcid,  your symptoms are gone you could stop the Prevacid and see if you can get by on just the Pepcid which is the famotidine 20 mg twice a day.    But then if symptoms return, fill the prescription for Prevacid 30 mg daily and then after 2 more weeks, you could take the Pepcid 20 mg twice a day as needed.    If symptoms are persisting one or both medications that we do want a CT scan to look at your pancreas.

## 2025-05-21 NOTE — PROGRESS NOTES
Assessment & Plan       ICD-10-CM    1. Acute pain of both shoulders  M25.511 Orthopedic  Referral    M25.512       2. Hyperlipidemia LDL goal <130  E78.5 atorvastatin (LIPITOR) 20 MG tablet      3. Anxiety  F41.9 escitalopram (LEXAPRO) 5 MG tablet      4. Acute gastritis without hemorrhage, unspecified gastritis type  K29.00 famotidine (PEPCID) 20 MG tablet     LANsoprazole (PREVACID) 30 MG DR capsule        I am not convinced that the shoulder pain is caused by the escitalopram or Lexapro because it did not improve with your dose decrease to 10 mg.    I am very glad the mood is doing well and we will decrease the S-Citalopram down to 5 mg.  If you feel you need to increase that to 10 mg please let me know.     [facility] consult for bilateral shoulder pain.   Please call them and see if you can get in the same day as your appointment for your IT band.  Please ask if you can have bilateral shoulder steroid injections on that day.  Or you could start with the right shoulder.    Seeing [facility] for IT band.    When somebody has pain in the mid upper abdomen it could be gastritis or extra acid in the stomach.  Try to reduce or not take ibuprofen type meds at all if possible.  Limit caffeine and alcohol.    I am prescribing Pepcid which is famotidine 20 mg twice a day.    For now continue Prevacid or lansoprazole 30 mg daily.  If after a week  (on it for 2 weeks total) with the Pepcid,  your symptoms are gone you could stop the Prevacid and see if you can get by on just the Pepcid which is the famotidine 20 mg twice a day.    But then if symptoms return, fill the prescription for Prevacid 30 mg daily and then after 2 more weeks, you could take the Pepcid 20 mg twice a day as needed.    If symptoms are persisting one or both medications that we do want a CT scan to look at your pancreas.    30 minutes spent with this patient including chart review, patient visit and documentation on the date of  service.    The longitudinal plan of care for the diagnosis(es)/condition(s) as documented were addressed during this visit. Due to the added complexity in care, I will continue to support Migdalia in the subsequent management and with ongoing continuity of care.  Helping to manage her anxiety.                Subjective   Migdalia is a 74 year old, presenting for the following health issues:  Recheck Medication (Patient states that she has been having bilateral shoulder pain, and bilateral bicep pain. She believes that it might be related to a medication side effect. She thinks the Lexapro might be responsible. )        5/21/2025    10:35 AM   Additional Questions   Roomed by Mari ORDOÑEZ CMA     History of Present Illness       Mental Health Follow-up:  Patient presents to follow-up on Depression & Anxiety.Patient's depression since last visit has been:  Medium  The patient is not having other symptoms associated with depression.  Patient's anxiety since last visit has been:  Medium  The patient is not having other symptoms associated with anxiety.  Any significant life events: relationship concerns  Patient is not feeling anxious or having panic attacks.  Patient has no concerns about alcohol or drug use.    She eats 2-3 servings of fruits and vegetables daily.She consumes 0 sweetened beverage(s) daily.She exercises with enough effort to increase her heart rate 10 to 19 minutes per day.  She exercises with enough effort to increase her heart rate 3 or less days per week.   She is taking medications regularly.        Anxiety:  Doing better each day.  Divorce is final.    Bilateral shoulder pain:  Started on the right shold and radiates to upper arm and getting worse.  Pain with reaching back.  Then started in left shoulder the same time but was not as intense.  Pain with readhing badk /  Bothr arme hurst to readh up and curls her hair.  Symtpms started when dose of lexapro 15 to 20 mg.  Did not imrovoe with going to 10  "mg.  On  Atorvastatin for years without problems with joint or muscle pain.    Seeing [facility] for IT band issue and will be getting an steroid injection soon.    Epigastric pain and wraps around to both side to her back.  On and off for a few weeks.  Started Prevacid daily for 1 week and has not helped.  Gallblader is absent.    Headaches:  sharp pain head and Ibuprofen helps, aches 2 times a month and wonders if she can continue.  She had a negative CTA of the head and neck.        Objective    BP (!) 142/78 (BP Location: Left arm, Patient Position: Sitting, Cuff Size: Adult Large)   Pulse 65   Temp 98  F (36.7  C) (Oral)   Resp 16   Ht 1.695 m (5' 6.73\")   Wt 82.5 kg (181 lb 12.8 oz)   SpO2 98%   BMI 28.70 kg/m    Body mass index is 28.7 kg/m .  Physical Exam   GENERAL: alert and no distress            Signed Electronically by: Anahi Aguilar MD    "

## 2025-05-22 ENCOUNTER — PATIENT OUTREACH (OUTPATIENT)
Dept: CARE COORDINATION | Facility: CLINIC | Age: 75
End: 2025-05-22
Payer: COMMERCIAL

## 2025-05-26 ENCOUNTER — PATIENT OUTREACH (OUTPATIENT)
Dept: CARE COORDINATION | Facility: CLINIC | Age: 75
End: 2025-05-26
Payer: COMMERCIAL

## 2025-06-04 ENCOUNTER — TRANSFERRED RECORDS (OUTPATIENT)
Dept: HEALTH INFORMATION MANAGEMENT | Facility: CLINIC | Age: 75
End: 2025-06-04

## 2025-07-09 DIAGNOSIS — I10 ESSENTIAL HYPERTENSION, BENIGN: ICD-10-CM

## 2025-07-10 RX ORDER — POTASSIUM CHLORIDE 1500 MG/1
20 TABLET, EXTENDED RELEASE ORAL DAILY
Qty: 90 TABLET | Refills: 0 | Status: SHIPPED | OUTPATIENT
Start: 2025-07-10

## 2025-07-11 ENCOUNTER — MYC REFILL (OUTPATIENT)
Dept: FAMILY MEDICINE | Facility: CLINIC | Age: 75
End: 2025-07-11
Payer: COMMERCIAL

## 2025-07-11 DIAGNOSIS — E78.5 HYPERLIPIDEMIA LDL GOAL <130: ICD-10-CM

## 2025-07-14 RX ORDER — ATORVASTATIN CALCIUM 20 MG/1
TABLET, FILM COATED ORAL
Qty: 90 TABLET | Refills: 0 | Status: SHIPPED | OUTPATIENT
Start: 2025-07-14

## 2025-07-15 ENCOUNTER — MYC REFILL (OUTPATIENT)
Dept: FAMILY MEDICINE | Facility: CLINIC | Age: 75
End: 2025-07-15
Payer: COMMERCIAL

## 2025-07-15 DIAGNOSIS — I10 ESSENTIAL HYPERTENSION, BENIGN: ICD-10-CM

## 2025-07-16 RX ORDER — HYDROCHLOROTHIAZIDE 12.5 MG/1
12.5 TABLET ORAL DAILY
Qty: 90 TABLET | Refills: 0 | Status: SHIPPED | OUTPATIENT
Start: 2025-07-16

## 2025-07-25 ASSESSMENT — ANXIETY QUESTIONNAIRES
5. BEING SO RESTLESS THAT IT IS HARD TO SIT STILL: NOT AT ALL
2. NOT BEING ABLE TO STOP OR CONTROL WORRYING: NOT AT ALL
7. FEELING AFRAID AS IF SOMETHING AWFUL MIGHT HAPPEN: NOT AT ALL
6. BECOMING EASILY ANNOYED OR IRRITABLE: SEVERAL DAYS
7. FEELING AFRAID AS IF SOMETHING AWFUL MIGHT HAPPEN: NOT AT ALL
3. WORRYING TOO MUCH ABOUT DIFFERENT THINGS: SEVERAL DAYS
8. IF YOU CHECKED OFF ANY PROBLEMS, HOW DIFFICULT HAVE THESE MADE IT FOR YOU TO DO YOUR WORK, TAKE CARE OF THINGS AT HOME, OR GET ALONG WITH OTHER PEOPLE?: NOT DIFFICULT AT ALL
1. FEELING NERVOUS, ANXIOUS, OR ON EDGE: SEVERAL DAYS
4. TROUBLE RELAXING: SEVERAL DAYS
GAD7 TOTAL SCORE: 4
IF YOU CHECKED OFF ANY PROBLEMS ON THIS QUESTIONNAIRE, HOW DIFFICULT HAVE THESE PROBLEMS MADE IT FOR YOU TO DO YOUR WORK, TAKE CARE OF THINGS AT HOME, OR GET ALONG WITH OTHER PEOPLE: NOT DIFFICULT AT ALL
GAD7 TOTAL SCORE: 4
GAD7 TOTAL SCORE: 4

## 2025-07-30 ENCOUNTER — OFFICE VISIT (OUTPATIENT)
Dept: FAMILY MEDICINE | Facility: CLINIC | Age: 75
End: 2025-07-30
Payer: COMMERCIAL

## 2025-07-30 ENCOUNTER — ANCILLARY PROCEDURE (OUTPATIENT)
Dept: GENERAL RADIOLOGY | Facility: CLINIC | Age: 75
End: 2025-07-30
Attending: FAMILY MEDICINE
Payer: COMMERCIAL

## 2025-07-30 VITALS
HEART RATE: 67 BPM | SYSTOLIC BLOOD PRESSURE: 138 MMHG | HEIGHT: 67 IN | BODY MASS INDEX: 27.03 KG/M2 | WEIGHT: 172.2 LBS | RESPIRATION RATE: 16 BRPM | DIASTOLIC BLOOD PRESSURE: 85 MMHG | OXYGEN SATURATION: 100 % | TEMPERATURE: 98 F

## 2025-07-30 DIAGNOSIS — R07.81 RIB PAIN: ICD-10-CM

## 2025-07-30 DIAGNOSIS — F41.9 ANXIETY: ICD-10-CM

## 2025-07-30 DIAGNOSIS — E55.9 VITAMIN D DEFICIENCY: ICD-10-CM

## 2025-07-30 DIAGNOSIS — R55 SYNCOPE, UNSPECIFIED SYNCOPE TYPE: Primary | ICD-10-CM

## 2025-07-30 DIAGNOSIS — Z23 NEED FOR VACCINATION: ICD-10-CM

## 2025-07-30 PROCEDURE — 93246 EXT ECG>7D<15D RECORDING: CPT | Performed by: FAMILY MEDICINE

## 2025-07-30 PROCEDURE — 3075F SYST BP GE 130 - 139MM HG: CPT | Performed by: FAMILY MEDICINE

## 2025-07-30 PROCEDURE — 3079F DIAST BP 80-89 MM HG: CPT | Performed by: FAMILY MEDICINE

## 2025-07-30 PROCEDURE — 71101 X-RAY EXAM UNILAT RIBS/CHEST: CPT | Mod: TC | Performed by: RADIOLOGY

## 2025-07-30 PROCEDURE — G2211 COMPLEX E/M VISIT ADD ON: HCPCS | Performed by: FAMILY MEDICINE

## 2025-07-30 PROCEDURE — 99215 OFFICE O/P EST HI 40 MIN: CPT | Performed by: FAMILY MEDICINE

## 2025-07-30 RX ORDER — ESCITALOPRAM OXALATE 20 MG/1
20 TABLET ORAL DAILY
Qty: 90 TABLET | Refills: 3 | Status: SHIPPED | OUTPATIENT
Start: 2025-07-30

## 2025-07-30 RX ORDER — CYCLOBENZAPRINE HCL 10 MG
TABLET ORAL
Qty: 30 TABLET | Refills: 3 | Status: SHIPPED | OUTPATIENT
Start: 2025-07-30

## 2025-07-30 ASSESSMENT — ENCOUNTER SYMPTOMS: NERVOUS/ANXIOUS: 1
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
## 2025-07-30 NOTE — PROGRESS NOTES
Migdalia Coronado arrived here on 7/30/2025 5:43 PM for 8-14 Days  Zio monitor placement per ordering provider Dr Aguilar for the diagnosis Dizziness.  Patient s skin was prepped per protocol. Dr. Aguilar is the supervising MD.  Zio monitor was placed.  Instructions were reviewed with and given to the patient.  Patient verbalized understanding of wear, troubleshooting and monitor return instructions.

## 2025-07-30 NOTE — PATIENT INSTRUCTIONS
Recommend 64-96 oz of non-caffenated fluid per day.    I am ordering a Zio patch which is a 14-day heart monitor.  You will mail this back in after you have worn it for 2 weeks.      Certainly call 911 if you have any chest pain, severe shortness of breath, stroke type symptoms, or if you do pass out again definitely you do want someone to call 911.    Cardiology consult.  [facility] will call you to coordinate your care as prescribed by your provider. If you don't hear from a representative within 2 business days, please call [phone].     Neurology consult.  [facility] will call you to coordinate your care as prescribed by your provider. If you don't hear from a representative within 2 business days, please call [phone].     Today we will get a chest x-ray and right rib x-ray.  Radiology will overread that and we will contact you via [employer] with the result.    If this is a muscle sprain or strain I am giving you cyclobenzaprine a muscle relaxer 10 mg, 1/2-1 tab at bedtime as needed.      You can use Tylenol up to 3000 mg in a day so you could use 500 with 1000 mg up to 3 times a day as needed.    Try to avoid ibuprofen because of high blood pressure.    Increasing your Lexapro to 20 mg daily.

## 2025-07-31 ENCOUNTER — PATIENT OUTREACH (OUTPATIENT)
Dept: CARE COORDINATION | Facility: CLINIC | Age: 75
End: 2025-07-31
Payer: COMMERCIAL

## 2025-08-04 ENCOUNTER — PATIENT OUTREACH (OUTPATIENT)
Dept: CARE COORDINATION | Facility: CLINIC | Age: 75
End: 2025-08-04
Payer: COMMERCIAL

## 2025-08-14 ENCOUNTER — PATIENT OUTREACH (OUTPATIENT)
Dept: CARE COORDINATION | Facility: CLINIC | Age: 75
End: 2025-08-14
Payer: COMMERCIAL

## 2025-08-19 LAB — CV ZIO PRELIM RESULTS: NORMAL

## 2025-08-20 DIAGNOSIS — I47.10 SVT (SUPRAVENTRICULAR TACHYCARDIA): Primary | ICD-10-CM

## 2025-08-20 DIAGNOSIS — R55 SYNCOPE, UNSPECIFIED SYNCOPE TYPE: ICD-10-CM
